# Patient Record
Sex: MALE | Race: WHITE | NOT HISPANIC OR LATINO
[De-identification: names, ages, dates, MRNs, and addresses within clinical notes are randomized per-mention and may not be internally consistent; named-entity substitution may affect disease eponyms.]

---

## 2018-01-11 ENCOUNTER — HOSPITAL (OUTPATIENT)
Dept: OTHER | Age: 58
End: 2018-01-11
Attending: FAMILY MEDICINE

## 2023-03-07 ENCOUNTER — TELEPHONE (OUTPATIENT)
Dept: PRIMARY CARE | Facility: CLINIC | Age: 63
End: 2023-03-07

## 2023-05-08 DIAGNOSIS — M62.838 MUSCLE SPASM: ICD-10-CM

## 2023-05-08 DIAGNOSIS — K21.9 GASTROESOPHAGEAL REFLUX DISEASE WITHOUT ESOPHAGITIS: Primary | ICD-10-CM

## 2023-05-08 DIAGNOSIS — G62.9 NEUROPATHY: ICD-10-CM

## 2023-05-08 RX ORDER — OMEPRAZOLE 40 MG/1
1 CAPSULE, DELAYED RELEASE ORAL DAILY
COMMUNITY
Start: 2019-01-17 | End: 2023-05-08 | Stop reason: SDUPTHER

## 2023-05-08 RX ORDER — OMEPRAZOLE 40 MG/1
40 CAPSULE, DELAYED RELEASE ORAL DAILY
Qty: 90 CAPSULE | Refills: 1 | Status: SHIPPED | OUTPATIENT
Start: 2023-05-08 | End: 2023-10-14 | Stop reason: HOSPADM

## 2023-05-15 RX ORDER — GABAPENTIN 600 MG/1
TABLET ORAL
COMMUNITY
Start: 2022-02-07 | End: 2023-05-15 | Stop reason: SDUPTHER

## 2023-05-15 RX ORDER — GABAPENTIN 600 MG/1
600 TABLET ORAL 3 TIMES DAILY
Qty: 270 TABLET | Refills: 0 | Status: SHIPPED | OUTPATIENT
Start: 2023-05-15 | End: 2023-06-12 | Stop reason: SDUPTHER

## 2023-05-15 RX ORDER — TIZANIDINE 4 MG/1
4 TABLET ORAL 3 TIMES DAILY
Qty: 30 TABLET | Refills: 0 | Status: ON HOLD | OUTPATIENT
Start: 2023-05-15 | End: 2023-10-14 | Stop reason: SDUPTHER

## 2023-05-15 RX ORDER — TIZANIDINE 4 MG/1
1 TABLET ORAL 3 TIMES DAILY
COMMUNITY
Start: 2019-01-17 | End: 2023-05-15 | Stop reason: SDUPTHER

## 2023-06-12 ENCOUNTER — OFFICE VISIT (OUTPATIENT)
Dept: PRIMARY CARE | Facility: CLINIC | Age: 63
End: 2023-06-12
Payer: COMMERCIAL

## 2023-06-12 VITALS
WEIGHT: 315 LBS | HEART RATE: 78 BPM | BODY MASS INDEX: 41.75 KG/M2 | HEIGHT: 73 IN | SYSTOLIC BLOOD PRESSURE: 130 MMHG | DIASTOLIC BLOOD PRESSURE: 77 MMHG

## 2023-06-12 DIAGNOSIS — I10 BENIGN ESSENTIAL HYPERTENSION: Primary | ICD-10-CM

## 2023-06-12 DIAGNOSIS — I27.82 OTHER CHRONIC PULMONARY EMBOLISM WITHOUT ACUTE COR PULMONALE (MULTI): ICD-10-CM

## 2023-06-12 DIAGNOSIS — G62.9 NEUROPATHY: ICD-10-CM

## 2023-06-12 DIAGNOSIS — N40.1 BENIGN PROSTATIC HYPERPLASIA WITH URINARY FREQUENCY: ICD-10-CM

## 2023-06-12 DIAGNOSIS — E29.1 TESTOSTERONE DEFICIENCY IN MALE: ICD-10-CM

## 2023-06-12 DIAGNOSIS — F41.8 DEPRESSION WITH ANXIETY: ICD-10-CM

## 2023-06-12 DIAGNOSIS — I87.2 VENOUS INSUFFICIENCY: ICD-10-CM

## 2023-06-12 DIAGNOSIS — E66.01 MORBID OBESITY (MULTI): ICD-10-CM

## 2023-06-12 DIAGNOSIS — N52.9 ERECTILE DYSFUNCTION, UNSPECIFIED ERECTILE DYSFUNCTION TYPE: ICD-10-CM

## 2023-06-12 DIAGNOSIS — K21.9 GASTROESOPHAGEAL REFLUX DISEASE WITHOUT ESOPHAGITIS: ICD-10-CM

## 2023-06-12 DIAGNOSIS — E03.8 CENTRAL HYPOTHYROIDISM: ICD-10-CM

## 2023-06-12 DIAGNOSIS — R35.0 BENIGN PROSTATIC HYPERPLASIA WITH URINARY FREQUENCY: ICD-10-CM

## 2023-06-12 PROBLEM — E66.9 OBESITY, CLASS II, BMI 35-39.9: Status: ACTIVE | Noted: 2023-06-12

## 2023-06-12 PROBLEM — M17.0 OSTEOARTHRITIS OF BOTH KNEES: Status: ACTIVE | Noted: 2023-06-12

## 2023-06-12 PROBLEM — L03.90 CELLULITIS: Status: ACTIVE | Noted: 2023-06-12

## 2023-06-12 PROBLEM — R53.82 CHRONIC FATIGUE: Status: ACTIVE | Noted: 2023-06-12

## 2023-06-12 PROBLEM — H52.209 ASTIGMATISM: Status: ACTIVE | Noted: 2023-06-12

## 2023-06-12 PROBLEM — G47.30 SLEEP APNEA: Status: ACTIVE | Noted: 2023-06-12

## 2023-06-12 PROBLEM — M19.90 ARTHRITIS: Status: ACTIVE | Noted: 2023-06-12

## 2023-06-12 PROBLEM — R63.2 POLYPHAGIA: Status: ACTIVE | Noted: 2023-06-12

## 2023-06-12 PROBLEM — H25.811 COMBINED FORM OF AGE-RELATED CATARACT, RIGHT EYE: Status: ACTIVE | Noted: 2023-06-12

## 2023-06-12 PROBLEM — R53.83 DECREASED ENERGY: Status: ACTIVE | Noted: 2023-06-12

## 2023-06-12 PROBLEM — M48.061 LUMBAR SPINAL STENOSIS: Status: ACTIVE | Noted: 2023-06-12

## 2023-06-12 PROBLEM — H35.89 MACULAR RPE MOTTLING: Status: ACTIVE | Noted: 2023-06-12

## 2023-06-12 PROBLEM — Z98.890 S/P LASIK SURGERY OF BOTH EYES: Status: ACTIVE | Noted: 2023-06-12

## 2023-06-12 PROBLEM — I26.99 PULMONARY EMBOLISM (MULTI): Status: ACTIVE | Noted: 2023-06-12

## 2023-06-12 PROBLEM — H52.4 PRESBYOPIA: Status: ACTIVE | Noted: 2023-06-12

## 2023-06-12 PROBLEM — I80.9 THROMBOPHLEBITIS: Status: ACTIVE | Noted: 2023-06-12

## 2023-06-12 PROBLEM — F41.0 PANIC DISORDER: Status: ACTIVE | Noted: 2023-06-12

## 2023-06-12 PROBLEM — F10.20 ALCOHOL USE DISORDER, MODERATE, DEPENDENCE (MULTI): Status: ACTIVE | Noted: 2023-06-12

## 2023-06-12 PROBLEM — E78.49 ESSENTIAL FAMILIAL HYPERLIPIDEMIA: Status: ACTIVE | Noted: 2023-06-12

## 2023-06-12 PROBLEM — G47.33 OBSTRUCTIVE SLEEP APNEA SYNDROME IN ADULT: Status: ACTIVE | Noted: 2023-06-12

## 2023-06-12 PROBLEM — N46.9 MALE INFERTILITY: Status: ACTIVE | Noted: 2023-06-12

## 2023-06-12 PROBLEM — R93.89 ABNORMAL CHEST CT: Status: ACTIVE | Noted: 2023-06-12

## 2023-06-12 PROBLEM — M54.50 LUMBAR PAIN: Status: ACTIVE | Noted: 2023-06-12

## 2023-06-12 PROBLEM — K91.2 MALNUTRITION FOLLOWING GASTROINTESTINAL SURGERY (HHS-HCC): Status: ACTIVE | Noted: 2023-06-12

## 2023-06-12 PROBLEM — M17.11 OSTEOARTHRITIS OF RIGHT KNEE: Status: ACTIVE | Noted: 2023-06-12

## 2023-06-12 PROBLEM — H52.00 HYPEROPIA: Status: ACTIVE | Noted: 2023-06-12

## 2023-06-12 PROBLEM — M76.70 PERONEAL TENDINITIS: Status: ACTIVE | Noted: 2023-06-12

## 2023-06-12 PROBLEM — N40.0 BPH WITHOUT URINARY OBSTRUCTION: Status: ACTIVE | Noted: 2023-06-12

## 2023-06-12 PROBLEM — H25.812 COMBINED FORM OF AGE-RELATED CATARACT, LEFT EYE: Status: ACTIVE | Noted: 2023-06-12

## 2023-06-12 PROBLEM — E66.812 OBESITY, CLASS II, BMI 35-39.9: Status: ACTIVE | Noted: 2023-06-12

## 2023-06-12 PROBLEM — D64.9 ANEMIA: Status: ACTIVE | Noted: 2023-06-12

## 2023-06-12 PROBLEM — M76.60 ACHILLES TENDINITIS: Status: ACTIVE | Noted: 2023-06-12

## 2023-06-12 PROBLEM — M25.569 KNEE PAIN: Status: ACTIVE | Noted: 2023-06-12

## 2023-06-12 PROBLEM — M43.10 ACQUIRED SPONDYLOLISTHESIS: Status: ACTIVE | Noted: 2023-06-12

## 2023-06-12 PROBLEM — M25.879 ANKLE IMPINGEMENT SYNDROME: Status: ACTIVE | Noted: 2023-06-12

## 2023-06-12 PROBLEM — R33.9 URINARY RETENTION WITH INCOMPLETE BLADDER EMPTYING: Status: ACTIVE | Noted: 2023-06-12

## 2023-06-12 LAB
ALANINE AMINOTRANSFERASE (SGPT) (U/L) IN SER/PLAS: 50 U/L (ref 10–52)
ALBUMIN (G/DL) IN SER/PLAS: 4.3 G/DL (ref 3.4–5)
ALKALINE PHOSPHATASE (U/L) IN SER/PLAS: 54 U/L (ref 33–136)
ANION GAP IN SER/PLAS: 14 MMOL/L (ref 10–20)
ASPARTATE AMINOTRANSFERASE (SGOT) (U/L) IN SER/PLAS: 45 U/L (ref 9–39)
BILIRUBIN TOTAL (MG/DL) IN SER/PLAS: 0.8 MG/DL (ref 0–1.2)
CALCIUM (MG/DL) IN SER/PLAS: 9.8 MG/DL (ref 8.6–10.6)
CARBON DIOXIDE, TOTAL (MMOL/L) IN SER/PLAS: 25 MMOL/L (ref 21–32)
CHLORIDE (MMOL/L) IN SER/PLAS: 103 MMOL/L (ref 98–107)
CHOLESTEROL (MG/DL) IN SER/PLAS: 154 MG/DL (ref 0–199)
CHOLESTEROL IN HDL (MG/DL) IN SER/PLAS: 40.4 MG/DL
CHOLESTEROL/HDL RATIO: 3.8
CREATININE (MG/DL) IN SER/PLAS: 1.05 MG/DL (ref 0.5–1.3)
ESTRADIOL (PG/ML) IN SER/PLAS: 81 PG/ML
GFR MALE: 80 ML/MIN/1.73M2
GLUCOSE (MG/DL) IN SER/PLAS: 98 MG/DL (ref 74–99)
LDL: 76 MG/DL (ref 0–99)
POTASSIUM (MMOL/L) IN SER/PLAS: 3.9 MMOL/L (ref 3.5–5.3)
PROSTATE SPECIFIC AG (NG/ML) IN SER/PLAS: 1.8 NG/ML (ref 0–4)
PROTEIN TOTAL: 7.1 G/DL (ref 6.4–8.2)
SODIUM (MMOL/L) IN SER/PLAS: 138 MMOL/L (ref 136–145)
THYROPEROXIDASE AB (IU/ML) IN SER/PLAS: <28 IU/ML
THYROTROPIN (MIU/L) IN SER/PLAS BY DETECTION LIMIT <= 0.05 MIU/L: 1.96 MIU/L (ref 0.44–3.98)
THYROXINE (T4) FREE (NG/DL) IN SER/PLAS: 0.87 NG/DL (ref 0.78–1.48)
TRIGLYCERIDE (MG/DL) IN SER/PLAS: 188 MG/DL (ref 0–149)
UREA NITROGEN (MG/DL) IN SER/PLAS: 17 MG/DL (ref 6–23)
VLDL: 38 MG/DL (ref 0–40)

## 2023-06-12 PROCEDURE — 84403 ASSAY OF TOTAL TESTOSTERONE: CPT

## 2023-06-12 PROCEDURE — 84402 ASSAY OF FREE TESTOSTERONE: CPT

## 2023-06-12 PROCEDURE — 99214 OFFICE O/P EST MOD 30 MIN: CPT | Performed by: FAMILY MEDICINE

## 2023-06-12 PROCEDURE — 1036F TOBACCO NON-USER: CPT | Performed by: FAMILY MEDICINE

## 2023-06-12 PROCEDURE — 3075F SYST BP GE 130 - 139MM HG: CPT | Performed by: FAMILY MEDICINE

## 2023-06-12 PROCEDURE — 80061 LIPID PANEL: CPT

## 2023-06-12 PROCEDURE — 84443 ASSAY THYROID STIM HORMONE: CPT

## 2023-06-12 PROCEDURE — 82670 ASSAY OF TOTAL ESTRADIOL: CPT

## 2023-06-12 PROCEDURE — 80053 COMPREHEN METABOLIC PANEL: CPT

## 2023-06-12 PROCEDURE — 86376 MICROSOMAL ANTIBODY EACH: CPT

## 2023-06-12 PROCEDURE — 84439 ASSAY OF FREE THYROXINE: CPT

## 2023-06-12 PROCEDURE — 84153 ASSAY OF PSA TOTAL: CPT

## 2023-06-12 PROCEDURE — 3078F DIAST BP <80 MM HG: CPT | Performed by: FAMILY MEDICINE

## 2023-06-12 RX ORDER — MELATONIN 10 MG
1 TABLET, SUBLINGUAL SUBLINGUAL EVERY OTHER DAY
COMMUNITY

## 2023-06-12 RX ORDER — IRBESARTAN AND HYDROCHLOROTHIAZIDE 150; 12.5 MG/1; MG/1
TABLET, FILM COATED ORAL
COMMUNITY
End: 2023-07-20 | Stop reason: SDUPTHER

## 2023-06-12 RX ORDER — ROSUVASTATIN CALCIUM 10 MG/1
TABLET, COATED ORAL
COMMUNITY
End: 2023-10-16 | Stop reason: SDUPTHER

## 2023-06-12 RX ORDER — TADALAFIL 20 MG/1
TABLET ORAL
COMMUNITY
End: 2023-07-19 | Stop reason: SDUPTHER

## 2023-06-12 RX ORDER — QUETIAPINE FUMARATE 50 MG/1
TABLET, FILM COATED ORAL
COMMUNITY
End: 2023-07-19 | Stop reason: SDUPTHER

## 2023-06-12 RX ORDER — ALPHA LIPOIC ACID 200 MG
CAPSULE ORAL
COMMUNITY
End: 2024-03-06 | Stop reason: WASHOUT

## 2023-06-12 RX ORDER — GABAPENTIN 600 MG/1
600 TABLET ORAL 3 TIMES DAILY
Qty: 270 TABLET | Refills: 1 | Status: SHIPPED | OUTPATIENT
Start: 2023-06-12 | End: 2023-10-16 | Stop reason: SDUPTHER

## 2023-06-12 RX ORDER — BUPROPION HYDROCHLORIDE 100 MG/1
100 TABLET ORAL 3 TIMES DAILY
Qty: 270 TABLET | Refills: 1 | Status: SHIPPED | OUTPATIENT
Start: 2023-06-12 | End: 2023-10-16 | Stop reason: SDUPTHER

## 2023-06-12 RX ORDER — MELOXICAM 15 MG/1
15 TABLET ORAL DAILY PRN
COMMUNITY
End: 2023-10-14 | Stop reason: HOSPADM

## 2023-06-12 RX ORDER — BUPROPION HYDROCHLORIDE 100 MG/1
TABLET ORAL
COMMUNITY
End: 2023-06-12 | Stop reason: SDUPTHER

## 2023-06-12 ASSESSMENT — ENCOUNTER SYMPTOMS
HEMATOLOGIC/LYMPHATIC NEGATIVE: 1
GASTROINTESTINAL NEGATIVE: 1
CONSTITUTIONAL NEGATIVE: 1
MUSCULOSKELETAL NEGATIVE: 1
CARDIOVASCULAR NEGATIVE: 1
NEUROLOGICAL NEGATIVE: 1
EYES NEGATIVE: 1
PSYCHIATRIC NEGATIVE: 1
RESPIRATORY NEGATIVE: 1
ALLERGIC/IMMUNOLOGIC NEGATIVE: 1
ENDOCRINE NEGATIVE: 1

## 2023-06-12 NOTE — PROGRESS NOTES
Pt comes in for fu on meds. Pt has a sheet that has labs that he needs drawn for his specialist. Pt has no concerns today. Pt is no longer on the testosterone inj, but on the pellets and unsure of his dosing.

## 2023-06-12 NOTE — PROGRESS NOTES
"Subjective   Patient ID: Cj Vargas is a 62 y.o. male who presents for No chief complaint on file..    HPI htn, venous inusff , fatiqeu , low t, ed, gerd,     Review of Systems   Constitutional: Negative.    HENT: Negative.     Eyes: Negative.    Respiratory: Negative.     Cardiovascular: Negative.    Gastrointestinal: Negative.    Endocrine: Negative.    Musculoskeletal: Negative.    Skin: Negative.    Allergic/Immunologic: Negative.    Neurological: Negative.    Hematological: Negative.    Psychiatric/Behavioral: Negative.         Objective   /77   Pulse 78   Ht 1.854 m (6' 1\")   Wt 144 kg (317 lb)   BMI 41.82 kg/m²     Physical Exam  Vitals reviewed.   Constitutional:       Appearance: Normal appearance. He is obese.   Eyes:      Extraocular Movements: Extraocular movements intact.      Conjunctiva/sclera: Conjunctivae normal.      Pupils: Pupils are equal, round, and reactive to light.   Cardiovascular:      Rate and Rhythm: Normal rate and regular rhythm.      Pulses: Normal pulses.      Heart sounds: Normal heart sounds.   Pulmonary:      Effort: Pulmonary effort is normal.      Breath sounds: Normal breath sounds.   Abdominal:      General: Bowel sounds are normal.      Palpations: Abdomen is soft.   Musculoskeletal:         General: Normal range of motion.   Skin:     General: Skin is warm and dry.   Neurological:      General: No focal deficit present.      Mental Status: He is alert and oriented to person, place, and time. Mental status is at baseline.         Assessment/Plan   Problem List Items Addressed This Visit          Circulatory    Benign essential hypertension - Primary    Relevant Orders    Comprehensive Metabolic Panel    Lipid Panel    Pulmonary embolism (CMS/HCC)    Relevant Medications    apixaban (Eliquis) 5 mg tablet    Venous insufficiency       Digestive    Acid reflux       Genitourinary    Erectile dysfunction       Endocrine/Metabolic    Morbid obesity (CMS/HCC)    " Relevant Orders    Referral to Nutrition Services    Testosterone deficiency in male    Relevant Orders    Testosterone, total and free    Estradiol     Other Visit Diagnoses       Neuropathy        Relevant Medications    gabapentin (Neurontin) 600 mg tablet    Benign prostatic hyperplasia with urinary frequency        Relevant Orders    Prostate Specific Antigen    Depression with anxiety        Relevant Medications    buPROPion (Wellbutrin) 100 mg tablet    Central hypothyroidism        Relevant Orders    TSH with reflex to Free T4 if abnormal    Thyroid Peroxidase (TPO) Antibody    T4, free

## 2023-06-13 ENCOUNTER — TELEPHONE (OUTPATIENT)
Dept: PRIMARY CARE | Facility: CLINIC | Age: 63
End: 2023-06-13
Payer: COMMERCIAL

## 2023-06-19 LAB
TESTOSTERONE FREE (CHAN): 193.9 PG/ML (ref 35–155)
TESTOSTERONE,TOTAL,LC-MS/MS: 1161 NG/DL (ref 250–1100)

## 2023-07-19 DIAGNOSIS — I10 BENIGN ESSENTIAL HYPERTENSION: ICD-10-CM

## 2023-07-19 DIAGNOSIS — N52.9 ERECTILE DYSFUNCTION, UNSPECIFIED ERECTILE DYSFUNCTION TYPE: Primary | ICD-10-CM

## 2023-07-19 DIAGNOSIS — F41.8 DEPRESSION WITH ANXIETY: ICD-10-CM

## 2023-07-20 RX ORDER — IRBESARTAN AND HYDROCHLOROTHIAZIDE 150; 12.5 MG/1; MG/1
1 TABLET, FILM COATED ORAL DAILY
Qty: 90 TABLET | Refills: 1 | Status: SHIPPED | OUTPATIENT
Start: 2023-07-20 | End: 2023-10-16 | Stop reason: SDUPTHER

## 2023-07-20 RX ORDER — TADALAFIL 20 MG/1
20 TABLET ORAL DAILY PRN
Qty: 10 TABLET | Refills: 3 | Status: SHIPPED | OUTPATIENT
Start: 2023-07-20 | End: 2023-10-25 | Stop reason: SDUPTHER

## 2023-07-20 RX ORDER — QUETIAPINE FUMARATE 50 MG/1
50 TABLET, FILM COATED ORAL 2 TIMES DAILY
Qty: 90 TABLET | Refills: 0 | Status: SHIPPED | OUTPATIENT
Start: 2023-07-20 | End: 2023-10-16 | Stop reason: SDUPTHER

## 2023-10-12 ENCOUNTER — APPOINTMENT (OUTPATIENT)
Dept: RADIOLOGY | Facility: HOSPITAL | Age: 63
DRG: 176 | End: 2023-10-12
Payer: COMMERCIAL

## 2023-10-12 ENCOUNTER — HOSPITAL ENCOUNTER (INPATIENT)
Facility: HOSPITAL | Age: 63
LOS: 2 days | Discharge: HOME | DRG: 176 | End: 2023-10-14
Attending: EMERGENCY MEDICINE | Admitting: INTERNAL MEDICINE
Payer: COMMERCIAL

## 2023-10-12 DIAGNOSIS — M62.838 MUSCLE SPASM: ICD-10-CM

## 2023-10-12 DIAGNOSIS — G47.33 OBSTRUCTIVE SLEEP APNEA SYNDROME IN ADULT: ICD-10-CM

## 2023-10-12 DIAGNOSIS — I82.431 ACUTE DEEP VEIN THROMBOSIS (DVT) OF POPLITEAL VEIN OF RIGHT LOWER EXTREMITY (MULTI): ICD-10-CM

## 2023-10-12 DIAGNOSIS — I26.99 ACUTE PULMONARY EMBOLISM, UNSPECIFIED PULMONARY EMBOLISM TYPE, UNSPECIFIED WHETHER ACUTE COR PULMONALE PRESENT (MULTI): Primary | ICD-10-CM

## 2023-10-12 DIAGNOSIS — I26.94 MULTIPLE SUBSEGMENTAL PULMONARY EMBOLI WITHOUT ACUTE COR PULMONALE (MULTI): ICD-10-CM

## 2023-10-12 DIAGNOSIS — I26.99 OTHER ACUTE PULMONARY EMBOLISM, UNSPECIFIED WHETHER ACUTE COR PULMONALE PRESENT (MULTI): ICD-10-CM

## 2023-10-12 LAB
ALBUMIN SERPL BCP-MCNC: 4.2 G/DL (ref 3.4–5)
ALP SERPL-CCNC: 47 U/L (ref 33–136)
ALT SERPL W P-5'-P-CCNC: 48 U/L (ref 10–52)
ANION GAP SERPL CALC-SCNC: 11 MMOL/L (ref 10–20)
ANION GAP SERPL CALC-SCNC: 12 MMOL/L (ref 10–20)
APPEARANCE UR: CLEAR
AST SERPL W P-5'-P-CCNC: 39 U/L (ref 9–39)
BASOPHILS # BLD AUTO: 0.02 X10*3/UL (ref 0–0.1)
BASOPHILS NFR BLD AUTO: 0.4 %
BILIRUB SERPL-MCNC: 1 MG/DL (ref 0–1.2)
BILIRUB UR STRIP.AUTO-MCNC: NEGATIVE MG/DL
BUN SERPL-MCNC: 10 MG/DL (ref 6–23)
BUN SERPL-MCNC: 11 MG/DL (ref 6–23)
CALCIUM SERPL-MCNC: 9.2 MG/DL (ref 8.6–10.3)
CALCIUM SERPL-MCNC: 9.5 MG/DL (ref 8.6–10.3)
CARDIAC TROPONIN I PNL SERPL HS: 4 NG/L (ref 0–20)
CHLORIDE SERPL-SCNC: 101 MMOL/L (ref 98–107)
CHLORIDE SERPL-SCNC: 102 MMOL/L (ref 98–107)
CO2 SERPL-SCNC: 25 MMOL/L (ref 21–32)
CO2 SERPL-SCNC: 28 MMOL/L (ref 21–32)
COLOR UR: YELLOW
CREAT SERPL-MCNC: 0.97 MG/DL (ref 0.5–1.3)
CREAT SERPL-MCNC: 1.11 MG/DL (ref 0.5–1.3)
D DIMER PPP FEU-MCNC: 1637 NG/ML FEU
EOSINOPHIL # BLD AUTO: 0.15 X10*3/UL (ref 0–0.7)
EOSINOPHIL NFR BLD AUTO: 2.7 %
ERYTHROCYTE [DISTWIDTH] IN BLOOD BY AUTOMATED COUNT: 11.7 % (ref 11.5–14.5)
ERYTHROCYTE [DISTWIDTH] IN BLOOD BY AUTOMATED COUNT: 11.7 % (ref 11.5–14.5)
GFR SERPL CREATININE-BSD FRML MDRD: 75 ML/MIN/1.73M*2
GFR SERPL CREATININE-BSD FRML MDRD: 88 ML/MIN/1.73M*2
GLUCOSE SERPL-MCNC: 105 MG/DL (ref 74–99)
GLUCOSE SERPL-MCNC: 122 MG/DL (ref 74–99)
GLUCOSE UR STRIP.AUTO-MCNC: ABNORMAL MG/DL
HCT VFR BLD AUTO: 44.2 % (ref 41–52)
HCT VFR BLD AUTO: 45.3 % (ref 41–52)
HGB BLD-MCNC: 15 G/DL (ref 13.5–17.5)
HGB BLD-MCNC: 15.4 G/DL (ref 13.5–17.5)
IMM GRANULOCYTES # BLD AUTO: 0.01 X10*3/UL (ref 0–0.7)
IMM GRANULOCYTES NFR BLD AUTO: 0.2 % (ref 0–0.9)
KETONES UR STRIP.AUTO-MCNC: NEGATIVE MG/DL
LEUKOCYTE ESTERASE UR QL STRIP.AUTO: NEGATIVE
LYMPHOCYTES # BLD AUTO: 1.6 X10*3/UL (ref 1.2–4.8)
LYMPHOCYTES NFR BLD AUTO: 28.6 %
MCH RBC QN AUTO: 32.8 PG (ref 26–34)
MCH RBC QN AUTO: 32.8 PG (ref 26–34)
MCHC RBC AUTO-ENTMCNC: 33.9 G/DL (ref 32–36)
MCHC RBC AUTO-ENTMCNC: 34 G/DL (ref 32–36)
MCV RBC AUTO: 96 FL (ref 80–100)
MCV RBC AUTO: 97 FL (ref 80–100)
MONOCYTES # BLD AUTO: 0.44 X10*3/UL (ref 0.1–1)
MONOCYTES NFR BLD AUTO: 7.9 %
NEUTROPHILS # BLD AUTO: 3.37 X10*3/UL (ref 1.2–7.7)
NEUTROPHILS NFR BLD AUTO: 60.2 %
NITRITE UR QL STRIP.AUTO: NEGATIVE
NRBC BLD-RTO: 0 /100 WBCS (ref 0–0)
NRBC BLD-RTO: 0 /100 WBCS (ref 0–0)
PH UR STRIP.AUTO: 6 [PH]
PLATELET # BLD AUTO: 288 X10*3/UL (ref 150–450)
PLATELET # BLD AUTO: 292 X10*3/UL (ref 150–450)
PMV BLD AUTO: 9.7 FL (ref 7.5–11.5)
PMV BLD AUTO: 9.8 FL (ref 7.5–11.5)
POTASSIUM SERPL-SCNC: 3.8 MMOL/L (ref 3.5–5.3)
POTASSIUM SERPL-SCNC: 4 MMOL/L (ref 3.5–5.3)
PROT SERPL-MCNC: 7.1 G/DL (ref 6.4–8.2)
PROT UR STRIP.AUTO-MCNC: NEGATIVE MG/DL
RBC # BLD AUTO: 4.58 X10*6/UL (ref 4.5–5.9)
RBC # BLD AUTO: 4.7 X10*6/UL (ref 4.5–5.9)
RBC # UR STRIP.AUTO: NEGATIVE /UL
SODIUM SERPL-SCNC: 135 MMOL/L (ref 136–145)
SODIUM SERPL-SCNC: 136 MMOL/L (ref 136–145)
SP GR UR STRIP.AUTO: 1.03
UFH PPP CHRO-ACNC: 0.2 IU/ML
UFH PPP CHRO-ACNC: 0.4 IU/ML
UFH PPP CHRO-ACNC: 0.5 IU/ML
UROBILINOGEN UR STRIP.AUTO-MCNC: 4 MG/DL
WBC # BLD AUTO: 5.6 X10*3/UL (ref 4.4–11.3)
WBC # BLD AUTO: 6.7 X10*3/UL (ref 4.4–11.3)

## 2023-10-12 PROCEDURE — 85520 HEPARIN ASSAY: CPT | Performed by: EMERGENCY MEDICINE

## 2023-10-12 PROCEDURE — 2550000001 HC RX 255 CONTRASTS: Performed by: EMERGENCY MEDICINE

## 2023-10-12 PROCEDURE — 85025 COMPLETE CBC W/AUTO DIFF WBC: CPT | Performed by: EMERGENCY MEDICINE

## 2023-10-12 PROCEDURE — 71275 CT ANGIOGRAPHY CHEST: CPT

## 2023-10-12 PROCEDURE — 84484 ASSAY OF TROPONIN QUANT: CPT | Performed by: EMERGENCY MEDICINE

## 2023-10-12 PROCEDURE — 36415 COLL VENOUS BLD VENIPUNCTURE: CPT | Performed by: EMERGENCY MEDICINE

## 2023-10-12 PROCEDURE — 99222 1ST HOSP IP/OBS MODERATE 55: CPT | Performed by: INTERNAL MEDICINE

## 2023-10-12 PROCEDURE — 81003 URINALYSIS AUTO W/O SCOPE: CPT | Performed by: INTERNAL MEDICINE

## 2023-10-12 PROCEDURE — 80048 BASIC METABOLIC PNL TOTAL CA: CPT | Performed by: INTERNAL MEDICINE

## 2023-10-12 PROCEDURE — 93971 EXTREMITY STUDY: CPT | Performed by: STUDENT IN AN ORGANIZED HEALTH CARE EDUCATION/TRAINING PROGRAM

## 2023-10-12 PROCEDURE — 71275 CT ANGIOGRAPHY CHEST: CPT | Mod: FOREIGN READ | Performed by: RADIOLOGY

## 2023-10-12 PROCEDURE — 99291 CRITICAL CARE FIRST HOUR: CPT | Mod: 25 | Performed by: EMERGENCY MEDICINE

## 2023-10-12 PROCEDURE — 2500000001 HC RX 250 WO HCPCS SELF ADMINISTERED DRUGS (ALT 637 FOR MEDICARE OP): Performed by: INTERNAL MEDICINE

## 2023-10-12 PROCEDURE — 85520 HEPARIN ASSAY: CPT | Performed by: INTERNAL MEDICINE

## 2023-10-12 PROCEDURE — 2500000004 HC RX 250 GENERAL PHARMACY W/ HCPCS (ALT 636 FOR OP/ED): Performed by: INTERNAL MEDICINE

## 2023-10-12 PROCEDURE — 85379 FIBRIN DEGRADATION QUANT: CPT | Performed by: EMERGENCY MEDICINE

## 2023-10-12 PROCEDURE — 93970 EXTREMITY STUDY: CPT

## 2023-10-12 PROCEDURE — 1200000002 HC GENERAL ROOM WITH TELEMETRY DAILY

## 2023-10-12 PROCEDURE — 85027 COMPLETE CBC AUTOMATED: CPT | Performed by: INTERNAL MEDICINE

## 2023-10-12 PROCEDURE — 80053 COMPREHEN METABOLIC PANEL: CPT | Performed by: EMERGENCY MEDICINE

## 2023-10-12 RX ORDER — IRBESARTAN AND HYDROCHLOROTHIAZIDE 150; 12.5 MG/1; MG/1
1 TABLET, FILM COATED ORAL DAILY
Status: DISCONTINUED | OUTPATIENT
Start: 2023-10-12 | End: 2023-10-12 | Stop reason: RX

## 2023-10-12 RX ORDER — LOSARTAN POTASSIUM 50 MG/1
50 TABLET ORAL DAILY
Status: DISCONTINUED | OUTPATIENT
Start: 2023-10-12 | End: 2023-10-14 | Stop reason: HOSPADM

## 2023-10-12 RX ORDER — TIZANIDINE 4 MG/1
4 TABLET ORAL 3 TIMES DAILY PRN
Status: DISCONTINUED | OUTPATIENT
Start: 2023-10-12 | End: 2023-10-14 | Stop reason: HOSPADM

## 2023-10-12 RX ORDER — ROSUVASTATIN CALCIUM 10 MG/1
10 TABLET, COATED ORAL DAILY
Status: DISCONTINUED | OUTPATIENT
Start: 2023-10-12 | End: 2023-10-14 | Stop reason: HOSPADM

## 2023-10-12 RX ORDER — TRAMADOL HYDROCHLORIDE 50 MG/1
50 TABLET ORAL 4 TIMES DAILY PRN
Status: DISCONTINUED | OUTPATIENT
Start: 2023-10-12 | End: 2023-10-14 | Stop reason: HOSPADM

## 2023-10-12 RX ORDER — ONDANSETRON 4 MG/1
4 TABLET, FILM COATED ORAL EVERY 8 HOURS PRN
Status: DISCONTINUED | OUTPATIENT
Start: 2023-10-12 | End: 2023-10-14 | Stop reason: HOSPADM

## 2023-10-12 RX ORDER — TALC
3 POWDER (GRAM) TOPICAL DAILY
Status: DISCONTINUED | OUTPATIENT
Start: 2023-10-12 | End: 2023-10-14 | Stop reason: HOSPADM

## 2023-10-12 RX ORDER — HEPARIN SODIUM 5000 [USP'U]/ML
10000 INJECTION, SOLUTION INTRAVENOUS; SUBCUTANEOUS ONCE
Status: DISCONTINUED | OUTPATIENT
Start: 2023-10-12 | End: 2023-10-12

## 2023-10-12 RX ORDER — HYDROCHLOROTHIAZIDE 12.5 MG/1
12.5 TABLET ORAL DAILY
Status: DISCONTINUED | OUTPATIENT
Start: 2023-10-12 | End: 2023-10-14 | Stop reason: HOSPADM

## 2023-10-12 RX ORDER — HEPARIN SODIUM 5000 [USP'U]/ML
INJECTION, SOLUTION INTRAVENOUS; SUBCUTANEOUS
Status: DISCONTINUED
Start: 2023-10-12 | End: 2023-10-12 | Stop reason: WASHOUT

## 2023-10-12 RX ORDER — GABAPENTIN 300 MG/1
600 CAPSULE ORAL EVERY 8 HOURS SCHEDULED
Status: DISCONTINUED | OUTPATIENT
Start: 2023-10-12 | End: 2023-10-14 | Stop reason: HOSPADM

## 2023-10-12 RX ORDER — BUPROPION HYDROCHLORIDE 100 MG/1
100 TABLET ORAL 3 TIMES DAILY
Status: DISCONTINUED | OUTPATIENT
Start: 2023-10-12 | End: 2023-10-14 | Stop reason: HOSPADM

## 2023-10-12 RX ORDER — OXYCODONE HYDROCHLORIDE 5 MG/1
5 TABLET ORAL EVERY 6 HOURS PRN
Status: DISCONTINUED | OUTPATIENT
Start: 2023-10-12 | End: 2023-10-14 | Stop reason: HOSPADM

## 2023-10-12 RX ORDER — POLYETHYLENE GLYCOL 3350 17 G/17G
17 POWDER, FOR SOLUTION ORAL DAILY
Status: DISCONTINUED | OUTPATIENT
Start: 2023-10-12 | End: 2023-10-14 | Stop reason: HOSPADM

## 2023-10-12 RX ORDER — PANTOPRAZOLE SODIUM 40 MG/10ML
40 INJECTION, POWDER, LYOPHILIZED, FOR SOLUTION INTRAVENOUS
Status: DISCONTINUED | OUTPATIENT
Start: 2023-10-13 | End: 2023-10-14 | Stop reason: HOSPADM

## 2023-10-12 RX ORDER — HEPARIN SODIUM 5000 [USP'U]/ML
5000-10000 INJECTION, SOLUTION INTRAVENOUS; SUBCUTANEOUS EVERY 4 HOURS PRN
Status: DISCONTINUED | OUTPATIENT
Start: 2023-10-12 | End: 2023-10-14

## 2023-10-12 RX ORDER — PANTOPRAZOLE SODIUM 40 MG/1
40 TABLET, DELAYED RELEASE ORAL
Status: DISCONTINUED | OUTPATIENT
Start: 2023-10-13 | End: 2023-10-14 | Stop reason: HOSPADM

## 2023-10-12 RX ORDER — HEPARIN SODIUM 10000 [USP'U]/100ML
INJECTION, SOLUTION INTRAVENOUS
Status: COMPLETED
Start: 2023-10-12 | End: 2023-10-13

## 2023-10-12 RX ORDER — QUETIAPINE FUMARATE 50 MG/1
50 TABLET, FILM COATED ORAL 2 TIMES DAILY
Status: DISCONTINUED | OUTPATIENT
Start: 2023-10-12 | End: 2023-10-14 | Stop reason: HOSPADM

## 2023-10-12 RX ORDER — ONDANSETRON HYDROCHLORIDE 2 MG/ML
4 INJECTION, SOLUTION INTRAVENOUS EVERY 8 HOURS PRN
Status: DISCONTINUED | OUTPATIENT
Start: 2023-10-12 | End: 2023-10-14 | Stop reason: HOSPADM

## 2023-10-12 RX ORDER — HEPARIN SODIUM 5000 [USP'U]/ML
10000 INJECTION, SOLUTION INTRAVENOUS; SUBCUTANEOUS ONCE
Status: COMPLETED | OUTPATIENT
Start: 2023-10-12 | End: 2023-10-13

## 2023-10-12 RX ORDER — ACETAMINOPHEN 325 MG/1
650 TABLET ORAL EVERY 6 HOURS PRN
Status: DISCONTINUED | OUTPATIENT
Start: 2023-10-12 | End: 2023-10-14 | Stop reason: HOSPADM

## 2023-10-12 RX ORDER — HEPARIN SODIUM 10000 [USP'U]/100ML
0-4500 INJECTION, SOLUTION INTRAVENOUS CONTINUOUS
Status: DISCONTINUED | OUTPATIENT
Start: 2023-10-12 | End: 2023-10-14

## 2023-10-12 RX ORDER — GUAIFENESIN 600 MG/1
600 TABLET, EXTENDED RELEASE ORAL EVERY 12 HOURS PRN
Status: DISCONTINUED | OUTPATIENT
Start: 2023-10-12 | End: 2023-10-14 | Stop reason: HOSPADM

## 2023-10-12 RX ADMIN — HYDROCHLOROTHIAZIDE 12.5 MG: 12.5 TABLET ORAL at 21:56

## 2023-10-12 RX ADMIN — QUETIAPINE FUMARATE 50 MG: 50 TABLET ORAL at 21:31

## 2023-10-12 RX ADMIN — OXYCODONE HYDROCHLORIDE 5 MG: 5 TABLET ORAL at 16:39

## 2023-10-12 RX ADMIN — LOSARTAN POTASSIUM 50 MG: 50 TABLET, FILM COATED ORAL at 21:56

## 2023-10-12 RX ADMIN — ACETAMINOPHEN 650 MG: 325 TABLET ORAL at 16:39

## 2023-10-12 RX ADMIN — IOHEXOL 75 ML: 350 INJECTION, SOLUTION INTRAVENOUS at 11:42

## 2023-10-12 RX ADMIN — BUPROPION HYDROCHLORIDE 100 MG: 100 TABLET, FILM COATED ORAL at 21:31

## 2023-10-12 RX ADMIN — GABAPENTIN 600 MG: 300 CAPSULE ORAL at 21:31

## 2023-10-12 RX ADMIN — TRAMADOL HYDROCHLORIDE 50 MG: 50 TABLET, COATED ORAL at 21:36

## 2023-10-12 SDOH — HEALTH STABILITY: MENTAL HEALTH: HOW OFTEN DO YOU HAVE A DRINK CONTAINING ALCOHOL?: MONTHLY OR LESS

## 2023-10-12 SDOH — SOCIAL STABILITY: SOCIAL INSECURITY: WITHIN THE LAST YEAR, HAVE YOU BEEN AFRAID OF YOUR PARTNER OR EX-PARTNER?: NO

## 2023-10-12 SDOH — SOCIAL STABILITY: SOCIAL INSECURITY: HAVE YOU HAD THOUGHTS OF HARMING ANYONE ELSE?: NO

## 2023-10-12 SDOH — ECONOMIC STABILITY: FOOD INSECURITY: WITHIN THE PAST 12 MONTHS, THE FOOD YOU BOUGHT JUST DIDN'T LAST AND YOU DIDN'T HAVE MONEY TO GET MORE.: NEVER TRUE

## 2023-10-12 SDOH — SOCIAL STABILITY: SOCIAL INSECURITY
WITHIN THE LAST YEAR, HAVE TO BEEN RAPED OR FORCED TO HAVE ANY KIND OF SEXUAL ACTIVITY BY YOUR PARTNER OR EX-PARTNER?: NO

## 2023-10-12 SDOH — ECONOMIC STABILITY: INCOME INSECURITY: IN THE PAST 12 MONTHS, HAS THE ELECTRIC, GAS, OIL, OR WATER COMPANY THREATENED TO SHUT OFF SERVICE IN YOUR HOME?: NO

## 2023-10-12 SDOH — HEALTH STABILITY: MENTAL HEALTH: HOW OFTEN DO YOU HAVE 6 OR MORE DRINKS ON ONE OCCASION?: NEVER

## 2023-10-12 SDOH — HEALTH STABILITY: MENTAL HEALTH: HOW MANY STANDARD DRINKS CONTAINING ALCOHOL DO YOU HAVE ON A TYPICAL DAY?: 1 OR 2

## 2023-10-12 SDOH — ECONOMIC STABILITY: TRANSPORTATION INSECURITY
IN THE PAST 12 MONTHS, HAS THE LACK OF TRANSPORTATION KEPT YOU FROM MEDICAL APPOINTMENTS OR FROM GETTING MEDICATIONS?: NO

## 2023-10-12 SDOH — HEALTH STABILITY: PHYSICAL HEALTH: ON AVERAGE, HOW MANY DAYS PER WEEK DO YOU ENGAGE IN MODERATE TO STRENUOUS EXERCISE (LIKE A BRISK WALK)?: 0 DAYS

## 2023-10-12 SDOH — SOCIAL STABILITY: SOCIAL NETWORK
DO YOU BELONG TO ANY CLUBS OR ORGANIZATIONS SUCH AS CHURCH GROUPS UNIONS, FRATERNAL OR ATHLETIC GROUPS, OR SCHOOL GROUPS?: NO

## 2023-10-12 SDOH — SOCIAL STABILITY: SOCIAL NETWORK
IN A TYPICAL WEEK, HOW MANY TIMES DO YOU TALK ON THE PHONE WITH FAMILY, FRIENDS, OR NEIGHBORS?: MORE THAN THREE TIMES A WEEK

## 2023-10-12 SDOH — SOCIAL STABILITY: SOCIAL INSECURITY
WITHIN THE LAST YEAR, HAVE YOU BEEN KICKED, HIT, SLAPPED, OR OTHERWISE PHYSICALLY HURT BY YOUR PARTNER OR EX-PARTNER?: NO

## 2023-10-12 SDOH — ECONOMIC STABILITY: INCOME INSECURITY: IN THE LAST 12 MONTHS, WAS THERE A TIME WHEN YOU WERE NOT ABLE TO PAY THE MORTGAGE OR RENT ON TIME?: NO

## 2023-10-12 SDOH — SOCIAL STABILITY: SOCIAL NETWORK: HOW OFTEN DO YOU ATTEND CHURCH OR RELIGIOUS SERVICES?: NEVER

## 2023-10-12 SDOH — ECONOMIC STABILITY: HOUSING INSECURITY
IN THE LAST 12 MONTHS, WAS THERE A TIME WHEN YOU DID NOT HAVE A STEADY PLACE TO SLEEP OR SLEPT IN A SHELTER (INCLUDING NOW)?: NO

## 2023-10-12 SDOH — HEALTH STABILITY: MENTAL HEALTH
STRESS IS WHEN SOMEONE FEELS TENSE, NERVOUS, ANXIOUS, OR CAN'T SLEEP AT NIGHT BECAUSE THEIR MIND IS TROUBLED. HOW STRESSED ARE YOU?: TO SOME EXTENT

## 2023-10-12 SDOH — SOCIAL STABILITY: SOCIAL INSECURITY: WITHIN THE LAST YEAR, HAVE YOU BEEN HUMILIATED OR EMOTIONALLY ABUSED IN OTHER WAYS BY YOUR PARTNER OR EX-PARTNER?: NO

## 2023-10-12 SDOH — SOCIAL STABILITY: SOCIAL INSECURITY: ARE THERE ANY APPARENT SIGNS OF INJURIES/BEHAVIORS THAT COULD BE RELATED TO ABUSE/NEGLECT?: NO

## 2023-10-12 SDOH — SOCIAL STABILITY: SOCIAL NETWORK: ARE YOU MARRIED, WIDOWED, DIVORCED, SEPARATED, NEVER MARRIED, OR LIVING WITH A PARTNER?: MARRIED

## 2023-10-12 SDOH — SOCIAL STABILITY: SOCIAL INSECURITY: DO YOU FEEL ANYONE HAS EXPLOITED OR TAKEN ADVANTAGE OF YOU FINANCIALLY OR OF YOUR PERSONAL PROPERTY?: NO

## 2023-10-12 SDOH — SOCIAL STABILITY: SOCIAL INSECURITY: DOES ANYONE TRY TO KEEP YOU FROM HAVING/CONTACTING OTHER FRIENDS OR DOING THINGS OUTSIDE YOUR HOME?: NO

## 2023-10-12 SDOH — SOCIAL STABILITY: SOCIAL INSECURITY: HAS ANYONE EVER THREATENED TO HURT YOUR FAMILY OR YOUR PETS?: NO

## 2023-10-12 SDOH — SOCIAL STABILITY: SOCIAL NETWORK: HOW OFTEN DO YOU GET TOGETHER WITH FRIENDS OR RELATIVES?: THREE TIMES A WEEK

## 2023-10-12 SDOH — SOCIAL STABILITY: SOCIAL NETWORK: HOW OFTEN DO YOU ATTENT MEETINGS OF THE CLUB OR ORGANIZATION YOU BELONG TO?: NEVER

## 2023-10-12 SDOH — SOCIAL STABILITY: SOCIAL INSECURITY: ABUSE: ADULT

## 2023-10-12 SDOH — ECONOMIC STABILITY: FOOD INSECURITY: WITHIN THE PAST 12 MONTHS, YOU WORRIED THAT YOUR FOOD WOULD RUN OUT BEFORE YOU GOT MONEY TO BUY MORE.: NEVER TRUE

## 2023-10-12 SDOH — ECONOMIC STABILITY: HOUSING INSECURITY: IN THE LAST 12 MONTHS, HOW MANY PLACES HAVE YOU LIVED?: 1

## 2023-10-12 SDOH — ECONOMIC STABILITY: TRANSPORTATION INSECURITY
IN THE PAST 12 MONTHS, HAS LACK OF TRANSPORTATION KEPT YOU FROM MEETINGS, WORK, OR FROM GETTING THINGS NEEDED FOR DAILY LIVING?: NO

## 2023-10-12 SDOH — SOCIAL STABILITY: SOCIAL INSECURITY: ARE YOU OR HAVE YOU BEEN THREATENED OR ABUSED PHYSICALLY, EMOTIONALLY, OR SEXUALLY BY ANYONE?: NO

## 2023-10-12 SDOH — ECONOMIC STABILITY: INCOME INSECURITY: HOW HARD IS IT FOR YOU TO PAY FOR THE VERY BASICS LIKE FOOD, HOUSING, MEDICAL CARE, AND HEATING?: NOT HARD AT ALL

## 2023-10-12 SDOH — SOCIAL STABILITY: SOCIAL INSECURITY: DO YOU FEEL UNSAFE GOING BACK TO THE PLACE WHERE YOU ARE LIVING?: NO

## 2023-10-12 SDOH — HEALTH STABILITY: PHYSICAL HEALTH: ON AVERAGE, HOW MANY MINUTES DO YOU ENGAGE IN EXERCISE AT THIS LEVEL?: 0 MIN

## 2023-10-12 ASSESSMENT — LIFESTYLE VARIABLES
HOW MANY STANDARD DRINKS CONTAINING ALCOHOL DO YOU HAVE ON A TYPICAL DAY: PATIENT DOES NOT DRINK
SKIP TO QUESTIONS 9-10: 1
AUDIT-C TOTAL SCORE: 0
AUDIT-C TOTAL SCORE: 1
SKIP TO QUESTIONS 9-10: 1
PRESCIPTION_ABUSE_PAST_12_MONTHS: NO
HOW OFTEN DO YOU HAVE A DRINK CONTAINING ALCOHOL: NEVER
SUBSTANCE_ABUSE_PAST_12_MONTHS: NO
AUDIT-C TOTAL SCORE: 0
HOW OFTEN DO YOU HAVE 6 OR MORE DRINKS ON ONE OCCASION: NEVER

## 2023-10-12 ASSESSMENT — COGNITIVE AND FUNCTIONAL STATUS - GENERAL
MOBILITY SCORE: 24
PATIENT BASELINE BEDBOUND: NO
DAILY ACTIVITIY SCORE: 24
MOBILITY SCORE: 24
DAILY ACTIVITIY SCORE: 24

## 2023-10-12 ASSESSMENT — PAIN DESCRIPTION - DESCRIPTORS: DESCRIPTORS: STABBING;SHARP

## 2023-10-12 ASSESSMENT — ACTIVITIES OF DAILY LIVING (ADL)
LACK_OF_TRANSPORTATION: NO
JUDGMENT_ADEQUATE_SAFELY_COMPLETE_DAILY_ACTIVITIES: YES
WALKS IN HOME: INDEPENDENT
HEARING - LEFT EAR: FUNCTIONAL
PATIENT'S MEMORY ADEQUATE TO SAFELY COMPLETE DAILY ACTIVITIES?: YES
GROOMING: INDEPENDENT
TOILETING: INDEPENDENT
HEARING - RIGHT EAR: FUNCTIONAL
EFFECT OF PAIN ON DAILY ACTIVITIES: WEAKNESS
DRESSING YOURSELF: INDEPENDENT
FEEDING YOURSELF: INDEPENDENT
BATHING: INDEPENDENT
ADEQUATE_TO_COMPLETE_ADL: YES
ASSISTIVE_DEVICE: EYEGLASSES

## 2023-10-12 ASSESSMENT — PATIENT HEALTH QUESTIONNAIRE - PHQ9
SUM OF ALL RESPONSES TO PHQ9 QUESTIONS 1 & 2: 0
2. FEELING DOWN, DEPRESSED OR HOPELESS: NOT AT ALL
1. LITTLE INTEREST OR PLEASURE IN DOING THINGS: NOT AT ALL

## 2023-10-12 ASSESSMENT — COLUMBIA-SUICIDE SEVERITY RATING SCALE - C-SSRS
1. IN THE PAST MONTH, HAVE YOU WISHED YOU WERE DEAD OR WISHED YOU COULD GO TO SLEEP AND NOT WAKE UP?: NO
2. HAVE YOU ACTUALLY HAD ANY THOUGHTS OF KILLING YOURSELF?: NO
6. HAVE YOU EVER DONE ANYTHING, STARTED TO DO ANYTHING, OR PREPARED TO DO ANYTHING TO END YOUR LIFE?: NO

## 2023-10-12 ASSESSMENT — PAIN - FUNCTIONAL ASSESSMENT
PAIN_FUNCTIONAL_ASSESSMENT: 0-10
PAIN_FUNCTIONAL_ASSESSMENT: 0-10

## 2023-10-12 ASSESSMENT — PAIN SCALES - GENERAL
PAINLEVEL_OUTOF10: 5 - MODERATE PAIN
PAINLEVEL_OUTOF10: 5 - MODERATE PAIN
PAINLEVEL_OUTOF10: 7
PAINLEVEL_OUTOF10: 4

## 2023-10-12 NOTE — H&P
Cj Vargas is a 62 y.o. male   HPI   Patient with a past medical history of chronic DVTs and PEs on long-term Eliquis hypertension dyslipidemia chronic back pain depression who had a recent bout of COVID when he lost about 18 pounds and was bedbound for about a week back in September comes in with shortness of breath on exertion  Work-up in the emergency room shows an acute PE  The patient has been compliant with his Eliquis he takes 5 mg twice a day      Past Medical History  Past Medical History:   Diagnosis Date    Unspecified cataract     Cataracts, both eyes       Surgical History  Past Surgical History:   Procedure Laterality Date    OTHER SURGICAL HISTORY  01/17/2019    Appendectomy    OTHER SURGICAL HISTORY  01/17/2019    Tonsillectomy    OTHER SURGICAL HISTORY  12/04/2020    Knee replacement    OTHER SURGICAL HISTORY  12/14/2022    Corneal lasik    OTHER SURGICAL HISTORY  02/13/2020    Sleeve gastrectomy        Social History  He reports that he has never smoked. He has never used smokeless tobacco. No history on file for alcohol use and drug use.    Family History  No family history on file.     Allergies  Patient has no known allergies.    Review of Systems     Constitutional: not feeling poorly, no fever, no recent weight gain and no recent weight loss.   Eyes: no blurred vision and no diplopia.   ENT: no hearing loss, no tinnitus, no earache, no sore throat, no hoarseness and no swollen glands in the neck.   Cardiovascular: no chest pain, no tightness or heavy pressure, no shortness of breath, no palpitations and no lower extremity edema.   Respiratory: no cough, wheezing or shortness of breath at rest or exertion  Gastrointestinal: no change in bowel habits, no diarrhea, no constipation, no bloody stools, no nausea, no vomiting, no abdominal pain, no signs and symptoms of ulcer disease, no marly colored stools and no intolerance to fatty foods.   Genitourinary: no urinary frequency, no dysuria, no  hematuria, no burning sensation during urination, urinary stream is not smaller and urinary stream does not start and stop.   Musculoskeletal: no arthralgias, no joint stiffness, no muscle weakness, no back pain and no difficulty walking.   Skin: no rashes, no change in skin color and pigmentation, no skin lesions and no skin lumps.   Neurological: no headaches, no dizziness, no seizures, no tingling, no numbness, no signs and symptoms of stroke and no limb weakness.   Psychiatric: no confusion, no memory lapses or loss, no depression and no sleep disturbances.   Endocrine: no goiter, no thyroid disorder, no diabetes mellitus, no excessive thirst, no dry skin, no cold intolerance, no heat intolerance and no increased urinary frequency.   Hematologic/Lymphatic: is not slow to heal, does not bleed easily, does not bruise easily, no thrombophlebitis, no anemia and no history of blood transfusion.   All other systems have been reviewed and are negative for complaint.     Vitals:    10/12/23 1537   BP: 123/79   Pulse: 79   Resp: 18   Temp: 36.7 °C (98 °F)   SpO2: 97%        Scheduled medications  buPROPion, 100 mg, oral, TID  gabapentin, 600 mg, oral, q8h KARTHIK  heparin (porcine), , ,   heparin, 10,000 Units, intravenous, Once  irbesartan-hydrochlorothiazide, 1 tablet, oral, Daily  melatonin, 3 mg, oral, Daily  [START ON 10/13/2023] pantoprazole, 40 mg, oral, Daily before breakfast   Or  [START ON 10/13/2023] pantoprazole, 40 mg, intravenous, Daily before breakfast  polyethylene glycol, 17 g, oral, Daily  psyllium, 1 packet, oral, Daily  QUEtiapine, 50 mg, oral, BID  rosuvastatin, 10 mg, oral, Daily      Continuous medications  heparin, 0-4,500 Units/hr      PRN medications  PRN medications: acetaminophen, guaiFENesin, heparin (porcine), heparin, ondansetron **OR** ondansetron, oxyCODONE, tiZANidine, traMADol    Results from last 7 days   Lab Units 10/12/23  1034   WBC AUTO x10*3/uL 5.6   HEMOGLOBIN g/dL 15.0   HEMATOCRIT  % 44.2   PLATELETS AUTO x10*3/uL 288     Results from last 7 days   Lab Units 10/12/23  1034   SODIUM mmol/L 136   POTASSIUM mmol/L 4.0   CHLORIDE mmol/L 101   CO2 mmol/L 28   BUN mg/dL 10   CREATININE mg/dL 0.97   CALCIUM mg/dL 9.5   PROTEIN TOTAL g/dL 7.1   BILIRUBIN TOTAL mg/dL 1.0   ALK PHOS U/L 47   ALT U/L 48   AST U/L 39   GLUCOSE mg/dL 122*     Results from last 7 days   Lab Units 10/12/23  1034   TROPHS ng/L 4        CT angio chest for pulmonary embolism   Final Result   1. Pulmonary arteries are adequately opacified and there are no   acute-appearing pulmonary emboli in the right middle and lower lobes   with mild embolic burden. There are also chronic appearing emboli   bilaterally similar compared to 4/27/2022. No evidence of right heart   strain.   2. Persistent small groundglass opacities in the periphery of the left   upper lobe.   3. Few scattered small pulmonary nodules measuring up to 3 to 4 mm in   the left lower lobe unchanged from prior. Next   Findings discussed with and acknowledged by Dr. Nir Au 12:38   PM   Fleischner Society 2017 Guidelines for Management of Incidentally   Detected Pulmonary Nodules in Adults:   A.  SOLID NODULES*   Nodule Type and Size:   Single:   *Low Risk**    < 6 mm - No routine follow-up   6-8 mm - CT at 6-12 months, then consider CT at 18-24 months   > 8 mm - Consider CT at 3 months, PET/CT, or tissue sampling   *High Risk**   < 6 mm - Optional CT at 12 months   6-8 mm - CT at 6-12 months, then CT at 18-24 months   > 8 mm - Consider CT at 3 months, PET/CT, or tissue sampling   Multiple:   *Low Risk**    < 6 mm - No routine follow-up   6-8 mm - CT at 3-6 months, then consider CT at 18-24 months   > 8 mm - CT at 3-6 months, then consider CT at 18-24 months   *High Risk**   < 6 mm - Optional CT at 12 months   6-8 mm - CT at 3-6 months, then at 18-24 months   > 8 mm - CT at 3-6 months, then at 18-24 months   B. SUBSOLID NODULES*   Nodule Type and Size:   Single:      *Ground glass   < 6 mm - No routine follow-up   > 6 mm - CT at 6-12 months to confirm persistence, then CT every 2   years until 5 years   *Part Solid   < 6 mm - No routine follow-up   > 6 mm - CT at 3-6 months to confirm persistence.  If unchanged and   solid component remains < 6 mm, annual CT should be performed for 5   years.   Multiple:   < 6 mm - CT at 3-6 months.  If stable, consider CT at 2 and 4 years.   > 6 mm - CT at 3-6 months.  Subsequent management based on the most   suspicious nodule(s).   Note - These recommendations do not apply to lung cancer screening,   patients with immunosuppression, or patients with known primary   cancer.   * Dimensions are average of long and short axes, rounded to the   nearest millimeter.   ** Consider all relevant risk factors.   Signed by Bebeto Dean MD          Physical Exam     Constitutional   General appearance: Alert and in no acute distress.   Eyes   Inspection of eyes: Sclera and conjunctiva were normal.    Pupil exam: Pupils were equal in size. Extraocular movements were intact.   Pulmonary   Respiratory assessment: No respiratory distress, normal respiratory rhythm and effort.    Auscultation of Lungs: Clear bilateral breath sounds.   Cardiovascular   Auscultation of heart: Apical pulse normal, heart rate and rhythm normal, normal S1 and S2, no murmurs and no pericardial rub.    Exam for edema: No peripheral edema.   Abdomen   Abdominal Exam: No bruits, normal bowel sounds, soft, non-tender, no abdominal mass palpated.    Liver and Spleen exam: No hepato-splenomegaly.   Musculoskeletal   Examination of gait: Normal.    Inspection of digits and nails: No clubbing or cyanosis of the fingernails.    Inspection/palpation of joints, bones and muscles: No joint swelling. Normal movement of all extremities.   Skin   Skin inspection: Normal skin color and pigmentation, normal skin turgor and no visible rash.   Neurologic   Cranial nerves: Nerves 2-12 were  intact, no focal neuro defects.   Psychiatric   Orientation: Oriented to person, place, and time.    Mood and affect: Normal.       Assessment/Plan        #Acute pulmonary embolism  Eliquis failure  Start heparin drip (0.2  We will check a Doppler ultrasound lower extremities  Check echocardiogram  Likely transition to Xarelto    The patient has all long history of blood clots  Has had hypercoagulable work-up in the past and the work-up was negative as per the patient  Positive family history of blood clots in father    #Hypertension  Stable continue home medications    #Dyslipidemia  Stable continue home medications    #Depression  Stable resume home medications    #Chronic back pain  Resume gabapentin

## 2023-10-12 NOTE — PROGRESS NOTES
10/12/23 1444   [facility] Disability Status   Are you deaf or do you have serious difficulty hearing? N   Are you blind or do you have serious difficulty seeing, even when wearing glasses? N   Because of a physical, mental, or emotional condition, do you have serious difficulty concentrating, remembering, or making decisions? (5 years old or older) N   Do you have serious difficulty walking or climbing stairs? N   Do you have serious difficulty dressing or bathing? N   Because of a physical, mental, or emotional condition, do you have serious difficulty doing errands alone such as visiting the doctor? N

## 2023-10-12 NOTE — ED PROVIDER NOTES
HPI   Chief Complaint   Patient presents with    Chest Pain       This is a 62-year-old male who presents to the emergency department complaining of chest pain.  The patient reports that he was diagnosed with COVID 2 weeks ago.  Since that time he has had chest pain.  The chest pain had been steadily decreasing.  Today, however, the pain was the same as yesterday and it did not decrease.  He reports mild shortness of breath with exertion.  He reports a history of PE in 2012 and a second PE 2 to 3 years ago.  He reports mild tingling in his left arm at times.  He had pain in his back last week but none today.  He denies nausea and vomiting.  He denies fever.  He reports a cough which is mostly nonproductive.      History provided by:  Patient and significant other   used: No                        No data recorded                Patient History   Past Medical History:   Diagnosis Date    Unspecified cataract     Cataracts, both eyes     Past Surgical History:   Procedure Laterality Date    OTHER SURGICAL HISTORY  01/17/2019    Appendectomy    OTHER SURGICAL HISTORY  01/17/2019    Tonsillectomy    OTHER SURGICAL HISTORY  12/04/2020    Knee replacement    OTHER SURGICAL HISTORY  12/14/2022    Corneal lasik    OTHER SURGICAL HISTORY  02/13/2020    Sleeve gastrectomy     No family history on file.  Social History     Tobacco Use    Smoking status: Never    Smokeless tobacco: Never   Substance Use Topics    Alcohol use: Not on file    Drug use: Not on file       Physical Exam   ED Triage Vitals [10/12/23 1029]   Temp Heart Rate Resp BP   37.2 °C (99 °F) 86 19 124/87      SpO2 Temp src Heart Rate Source Patient Position   98 % -- -- --      BP Location FiO2 (%)     -- --       Physical Exam  Vitals and nursing note reviewed.   HENT:      Head: Normocephalic and atraumatic.      Nose: Nose normal.   Eyes:      Conjunctiva/sclera: Conjunctivae normal.   Cardiovascular:      Rate and Rhythm: Normal rate  and regular rhythm.      Pulses: Normal pulses.      Heart sounds: Normal heart sounds.   Pulmonary:      Effort: Pulmonary effort is normal.      Breath sounds: Normal breath sounds.   Abdominal:      General: Bowel sounds are normal.      Palpations: Abdomen is soft.   Musculoskeletal:         General: Normal range of motion.      Cervical back: Normal range of motion and neck supple.   Skin:     Findings: No rash.   Neurological:      General: No focal deficit present.      Mental Status: He is alert and oriented to person, place, and time.   Psychiatric:         Mood and Affect: Mood normal.         ED Course & [facility]   Diagnoses as of 10/12/23 1615   Other acute pulmonary embolism, unspecified whether acute cor pulmonale present (CMS/HCC)   Acute pulmonary embolism, unspecified pulmonary embolism type, unspecified whether acute cor pulmonale present (CMS/HCC)       Medical Decision Making  Differential diagnosis: I have considered the following conditions in my assessment of   this patient's condition:  GERD, musculoskeletal chest pain, aortic dissection, cholecystitis,   ACS, pericarditis, myocarditis, tamponade, shingles,  pneumonia,   pneumothorax, pulmonary embolus.    This is a 62-year-old male who presents to the emergency department with chest pain and mild shortness of breath.  He has a history of PE.  He is anticoagulated with Eliquis.  He was evaluated with labs, CT and EKG.  Labs showed an elevated D-dimer of 1600.  CT scan shows new acute PE in the right middle and right lower lobe as discussed with the radiologist.  The patient has failed Eliquis.  Heparin drip was ordered.  Patient requires admission for further evaluation and management.    Amount and/or Complexity of Data Reviewed  Labs: ordered. Decision-making details documented in ED Course.  Radiology: ordered. Decision-making details documented in ED Course.  ECG/medicine tests: independent interpretation performed.     Details: Normal sinus  rhythm, heart rate 84, normal axis, no ST elevation.  Discussion of management or test interpretation with external provider(s): Dr. Gallardo who accepted the patient for admission.    Risk  Decision regarding hospitalization.        Procedure  Critical Care    Performed by: Nir Branch MD  Authorized by: Nir Branch MD    Critical care provider statement:     Critical care time (minutes):  35    Critical care time was exclusive of:  Separately billable procedures and treating other patients    Critical care was necessary to treat or prevent imminent or life-threatening deterioration of the following conditions:  Respiratory failure    Critical care was time spent personally by me on the following activities:  Examination of patient, obtaining history from patient or surrogate, ordering and review of radiographic studies, ordering and review of laboratory studies and development of treatment plan with patient or surrogate    I assumed direction of critical care for this patient from another provider in my specialty: no      Care discussed with: admitting provider         Nir Branch MD  10/12/23 1447

## 2023-10-12 NOTE — PROGRESS NOTES
Transitional Care Coordination Progress Note:  Plan per Medical/Surgical team: treatment of PE with heparin gtt  Status: ED  Payor source: [employer]  Discharge disposition: home with wife  Potential Barriers: failed Eliquis?  ADOD: 10/14/2023  DIETER Dennis RN, BSN Transitional Care Coordinator ED# [phone]      10/12/23 1444   Discharge Planning   Living Arrangements Spouse/significant other   Support Systems Spouse/significant other   Assistance Needed anti-coagulation, ?failed Eliquis   Type of Residence Private residence   Number of Stairs to Enter Residence 3   Number of Stairs Within Residence 13   Home or Post Acute Services None   Patient expects to be discharged to: home with wife   Does the patient need discharge transport arranged? No   Financial Resource Strain   How hard is it for you to pay for the very basics like food, housing, medical care, and heating? Not hard   Housing Stability   In the last 12 months, was there a time when you were not able to pay the mortgage or rent on time? N   In the last 12 months, how many places have you lived? 1   In the last 12 months, was there a time when you did not have a steady place to sleep or slept in a shelter (including now)? N   Transportation Needs   In the past 12 months, has lack of transportation kept you from medical appointments or from getting medications? no   In the past 12 months, has lack of transportation kept you from meetings, work, or from getting things needed for daily living? No

## 2023-10-12 NOTE — PROGRESS NOTES
Pharmacy Medication History Review    Cj Vargas is a 62 y.o. male admitted for Acute pulmonary embolism, unspecified pulmonary embolism type, unspecified whether acute cor pulmonale present (CMS/[facility]). Pharmacy reviewed the patient's [id number] medications and allergies for accuracy.    The list below reflectives the updated PTA list. Please review each medication in order reconciliation for additional clarification and justification.  Prior to Admission Medications   Prescriptions Last Dose Informant Patient Reported? Taking?   QUEtiapine (SEROquel) 50 mg tablet 10/12/2023  No No   Sig: Take 1 tablet (50 mg) by mouth 2 times a day.   apixaban (Eliquis) 5 mg tablet 10/12/2023  No No   Sig: Take 1 tablet (5 mg) by mouth 2 times a day.   buPROPion (Wellbutrin) 100 mg tablet 10/12/2023  No No   Sig: Take 1 tablet (100 mg) by mouth 3 times a day.   cholecalciferol, vitamin D3, 250 mcg (10,000 unit) tablet 10/11/2023  Yes No   Sig: Take 1 tablet (250 mcg) by mouth every other day.   gabapentin (Neurontin) 600 mg tablet 10/12/2023  No No   Sig: Take 1 tablet (600 mg) by mouth 3 times a day.   irbesartan-hydrochlorothiazide (Avalide) 150-12.5 mg tablet 10/12/2023  No No   Sig: Take 1 tablet by mouth once daily.   meloxicam (Mobic) 15 mg tablet 10/11/2023  Yes No   Sig: Take 1 tablet (15 mg) by mouth once daily as needed.   omeprazole (PriLOSEC) 40 mg DR capsule 10/12/2023  No No   Sig: Take 1 capsule (40 mg) by mouth once daily.   prasterone, dhea, 50 mg tablet 10/12/2023  Yes No   Si tab(s) orally 2 times a day   rosuvastatin (Crestor) 10 mg tablet 10/12/2023  Yes No   Sig: Take 1 tablet daily   tadalafil 20 mg tablet 10/12/2023  No No   Sig: Take 1 tablet (20 mg) by mouth once daily as needed for erectile dysfunction (sexual activity).   tiZANidine (Zanaflex) 4 mg tablet 10/11/2023  No No   Sig: Take 1 tablet (4 mg) by mouth 3 times a day.   Patient taking differently: Take 1 tablet (4 mg) by mouth 3  times a day as needed.   vitamins V6-H7-O3-B12-protease (B-Complex With B-12) 2.5 mg-2.5 mg- 5 mg-100 mcg tablet 10/12/2023  Yes No   Sig: TAKE 1 TABLET DAILY.      Facility-Administered Medications: None           The list below reflectives the updated allergy list. Please review each documented allergy for additional clarification and justification.  Allergies  Reviewed by Devendra Chung RN on 10/12/2023   No Known Allergies         Below are additional concerns with the patient's PTA list.      Alison Weston CPhT

## 2023-10-12 NOTE — PROGRESS NOTES
Home with wife     10/12/23 9300   Current Planned Discharge Disposition   Current Planned Discharge Disposition Home

## 2023-10-12 NOTE — CARE PLAN
The patient's goals for the shift include      The clinical goals for the shift include decreased pain    Over the shift, the patient did not make progress toward the following goals.

## 2023-10-12 NOTE — PROGRESS NOTES
10/12/23 1447   Physical Activity   On average, how many days per week do you engage in moderate to strenuous exercise (like a brisk walk)? 0 days   On average, how many minutes do you engage in exercise at this level? 0 min   Financial Resource Strain   How hard is it for you to pay for the very basics like food, housing, medical care, and heating? Not hard   Housing Stability   In the last 12 months, was there a time when you were not able to pay the mortgage or rent on time? N   In the last 12 months, how many places have you lived? 1   In the last 12 months, was there a time when you did not have a steady place to sleep or slept in a shelter (including now)? N   Transportation Needs   In the past 12 months, has lack of transportation kept you from medical appointments or from getting medications? no   In the past 12 months, has lack of transportation kept you from meetings, work, or from getting things needed for daily living? No   Food Insecurity   Within the past 12 months, you worried that your food would run out before you got the money to buy more. Never true   Within the past 12 months, the food you bought just didn't last and you didn't have money to get more. Never true   Stress   Do you feel stress - tense, restless, nervous, or anxious, or unable to sleep at night because your mind is troubled all the time - these days? To some exte   Social Connections   In a typical week, how many times do you talk on the phone with family, friends, or neighbors? More than 3   How often do you get together with friends or relatives? Three times   How often do you attend Taoism or [employer] services? Never   Do you belong to any clubs or organizations such as Taoism groups, unions, fraternal or athletic groups, or school groups? No   How often do you attend meetings of the clubs or organizations you belong to? Never   Are you , , , , never , or living with a partner?     Intimate Partner Violence   Within the last year, have you been afraid of your partner or ex-partner? No   Within the last year, have you been humiliated or emotionally abused in other ways by your partner or ex-partner? No   Within the last year, have you been kicked, hit, slapped, or otherwise physically hurt by your partner or ex-partner? No   Within the last year, have you been raped or forced to have any kind of sexual activity by your partner or ex-partner? No   Alcohol Use   Q1: How often do you have a drink containing alcohol? Monthly or l   Q2: How many drinks containing alcohol do you have on a typical day when you are drinking? 1 or 2   Q3: How often do you have six or more drinks on one occasion? Never   Utilities   In the past 12 months has the electric, gas, oil, or water company threatened to shut off services in your home? No

## 2023-10-12 NOTE — ED TRIAGE NOTES
PT TO ED FROM HOME WITH C/O CP FOR 2 WKS FOLLOWING COVID POS RESULT 2 WKS PRIOR. PT ALSO ADMITS TO BEING ON BLOOD THINNERS FOR BLOOD CLOTS.

## 2023-10-13 ENCOUNTER — APPOINTMENT (OUTPATIENT)
Dept: CARDIOLOGY | Facility: HOSPITAL | Age: 63
DRG: 176 | End: 2023-10-13
Payer: COMMERCIAL

## 2023-10-13 LAB
ANION GAP SERPL CALC-SCNC: 13 MMOL/L (ref 10–20)
BUN SERPL-MCNC: 11 MG/DL (ref 6–23)
CALCIUM SERPL-MCNC: 9.1 MG/DL (ref 8.6–10.3)
CHLORIDE SERPL-SCNC: 100 MMOL/L (ref 98–107)
CO2 SERPL-SCNC: 28 MMOL/L (ref 21–32)
CREAT SERPL-MCNC: 1.03 MG/DL (ref 0.5–1.3)
EJECTION FRACTION APICAL 4 CHAMBER: 62.8
ERYTHROCYTE [DISTWIDTH] IN BLOOD BY AUTOMATED COUNT: 11.6 % (ref 11.5–14.5)
ERYTHROCYTE [DISTWIDTH] IN BLOOD BY AUTOMATED COUNT: 11.6 % (ref 11.5–14.5)
GFR SERPL CREATININE-BSD FRML MDRD: 82 ML/MIN/1.73M*2
GLUCOSE SERPL-MCNC: 123 MG/DL (ref 74–99)
HCT VFR BLD AUTO: 43 % (ref 41–52)
HCT VFR BLD AUTO: 43.2 % (ref 41–52)
HGB BLD-MCNC: 14.5 G/DL (ref 13.5–17.5)
HGB BLD-MCNC: 14.7 G/DL (ref 13.5–17.5)
MCH RBC QN AUTO: 32.7 PG (ref 26–34)
MCH RBC QN AUTO: 32.7 PG (ref 26–34)
MCHC RBC AUTO-ENTMCNC: 33.7 G/DL (ref 32–36)
MCHC RBC AUTO-ENTMCNC: 34 G/DL (ref 32–36)
MCV RBC AUTO: 96 FL (ref 80–100)
MCV RBC AUTO: 97 FL (ref 80–100)
NRBC BLD-RTO: 0 /100 WBCS (ref 0–0)
NRBC BLD-RTO: 0 /100 WBCS (ref 0–0)
PLATELET # BLD AUTO: 259 X10*3/UL (ref 150–450)
PLATELET # BLD AUTO: 291 X10*3/UL (ref 150–450)
PMV BLD AUTO: 9.7 FL (ref 7.5–11.5)
PMV BLD AUTO: 9.8 FL (ref 7.5–11.5)
POTASSIUM SERPL-SCNC: 4 MMOL/L (ref 3.5–5.3)
RBC # BLD AUTO: 4.44 X10*6/UL (ref 4.5–5.9)
RBC # BLD AUTO: 4.49 X10*6/UL (ref 4.5–5.9)
SODIUM SERPL-SCNC: 137 MMOL/L (ref 136–145)
UFH PPP CHRO-ACNC: 0.1 IU/ML
UFH PPP CHRO-ACNC: 0.4 IU/ML
UFH PPP CHRO-ACNC: 0.5 IU/ML
UFH PPP CHRO-ACNC: 1.4 IU/ML
WBC # BLD AUTO: 5.5 X10*3/UL (ref 4.4–11.3)
WBC # BLD AUTO: 6.3 X10*3/UL (ref 4.4–11.3)

## 2023-10-13 PROCEDURE — 2500000004 HC RX 250 GENERAL PHARMACY W/ HCPCS (ALT 636 FOR OP/ED): Performed by: EMERGENCY MEDICINE

## 2023-10-13 PROCEDURE — 85027 COMPLETE CBC AUTOMATED: CPT | Performed by: INTERNAL MEDICINE

## 2023-10-13 PROCEDURE — 36415 COLL VENOUS BLD VENIPUNCTURE: CPT | Performed by: INTERNAL MEDICINE

## 2023-10-13 PROCEDURE — 85520 HEPARIN ASSAY: CPT | Performed by: INTERNAL MEDICINE

## 2023-10-13 PROCEDURE — 93306 TTE W/DOPPLER COMPLETE: CPT

## 2023-10-13 PROCEDURE — 93306 TTE W/DOPPLER COMPLETE: CPT | Performed by: INTERNAL MEDICINE

## 2023-10-13 PROCEDURE — 80048 BASIC METABOLIC PNL TOTAL CA: CPT | Performed by: INTERNAL MEDICINE

## 2023-10-13 PROCEDURE — 2500000001 HC RX 250 WO HCPCS SELF ADMINISTERED DRUGS (ALT 637 FOR MEDICARE OP): Performed by: INTERNAL MEDICINE

## 2023-10-13 PROCEDURE — 1200000002 HC GENERAL ROOM WITH TELEMETRY DAILY

## 2023-10-13 PROCEDURE — 2500000004 HC RX 250 GENERAL PHARMACY W/ HCPCS (ALT 636 FOR OP/ED)

## 2023-10-13 PROCEDURE — 2500000004 HC RX 250 GENERAL PHARMACY W/ HCPCS (ALT 636 FOR OP/ED): Performed by: INTERNAL MEDICINE

## 2023-10-13 RX ADMIN — BUPROPION HYDROCHLORIDE 100 MG: 100 TABLET, FILM COATED ORAL at 14:02

## 2023-10-13 RX ADMIN — HEPARIN SODIUM 2000 UNITS/HR: 10000 INJECTION, SOLUTION INTRAVENOUS at 00:24

## 2023-10-13 RX ADMIN — BUPROPION HYDROCHLORIDE 100 MG: 100 TABLET, FILM COATED ORAL at 09:46

## 2023-10-13 RX ADMIN — OXYCODONE HYDROCHLORIDE 5 MG: 5 TABLET ORAL at 18:53

## 2023-10-13 RX ADMIN — OXYCODONE HYDROCHLORIDE 5 MG: 5 TABLET ORAL at 12:33

## 2023-10-13 RX ADMIN — GABAPENTIN 600 MG: 300 CAPSULE ORAL at 21:05

## 2023-10-13 RX ADMIN — PANTOPRAZOLE SODIUM 40 MG: 40 TABLET, DELAYED RELEASE ORAL at 06:15

## 2023-10-13 RX ADMIN — BUPROPION HYDROCHLORIDE 100 MG: 100 TABLET, FILM COATED ORAL at 21:05

## 2023-10-13 RX ADMIN — HEPARIN SODIUM 1600 UNITS/HR: 10000 INJECTION, SOLUTION INTRAVENOUS at 21:05

## 2023-10-13 RX ADMIN — QUETIAPINE FUMARATE 50 MG: 50 TABLET ORAL at 21:05

## 2023-10-13 RX ADMIN — HEPARIN SODIUM 10000 UNITS: 5000 INJECTION INTRAVENOUS; SUBCUTANEOUS at 00:27

## 2023-10-13 RX ADMIN — OXYCODONE HYDROCHLORIDE 5 MG: 5 TABLET ORAL at 06:15

## 2023-10-13 RX ADMIN — ROSUVASTATIN 10 MG: 10 TABLET, FILM COATED ORAL at 09:46

## 2023-10-13 RX ADMIN — GABAPENTIN 600 MG: 300 CAPSULE ORAL at 14:02

## 2023-10-13 RX ADMIN — PERFLUTREN 1.5 ML OF DILUTION: 6.52 INJECTION, SUSPENSION INTRAVENOUS at 10:49

## 2023-10-13 RX ADMIN — GABAPENTIN 600 MG: 300 CAPSULE ORAL at 06:15

## 2023-10-13 ASSESSMENT — COGNITIVE AND FUNCTIONAL STATUS - GENERAL
MOBILITY SCORE: 24
DAILY ACTIVITIY SCORE: 24

## 2023-10-13 ASSESSMENT — PAIN SCALES - GENERAL
PAINLEVEL_OUTOF10: 7
PAINLEVEL_OUTOF10: 8
PAINLEVEL_OUTOF10: 3

## 2023-10-13 NOTE — PROGRESS NOTES
INPATIENT PROGRESS NOTES    PRIMARY SERVICE: Galdino Cheung MD         10/13/2023  9 41 am    INTERVAL HPI: Pt feels OK,     Getting echo  No new complaints  No chest pain  No leg pains    Tells me his wife reminded him that he did miss 2-3 days of Eliquis while ill w covid     PERTINENT ROS:  REVIEW OF SYSTEMS  GENERAL: negative for fever, SEE HPI  RESPIRATORY: Negative for cough, wheezing or shortness of breath.  CARDIOVASCULAR: Negative for chest pain, leg swelling or palpitations.  GI: Negative for abdominal discomfort, blood in stools or black stools or change in bowel habits  NEURO: no change per nursing  All other reviewed and negative other than HPI.      MEDICATIONS:    No current facility-administered medications on file prior to encounter.     Current Outpatient Medications on File Prior to Encounter   Medication Sig Dispense Refill    apixaban (Eliquis) 5 mg tablet Take 1 tablet (5 mg) by mouth 2 times a day. 180 tablet 1    buPROPion (Wellbutrin) 100 mg tablet Take 1 tablet (100 mg) by mouth 3 times a day. 270 tablet 1    cholecalciferol, vitamin D3, 250 mcg (10,000 unit) tablet Take 1 tablet (250 mcg) by mouth every other day.      gabapentin (Neurontin) 600 mg tablet Take 1 tablet (600 mg) by mouth 3 times a day. 270 tablet 1    irbesartan-hydrochlorothiazide (Avalide) 150-12.5 mg tablet Take 1 tablet by mouth once daily. 90 tablet 1    meloxicam (Mobic) 15 mg tablet Take 1 tablet (15 mg) by mouth once daily as needed.      omeprazole (PriLOSEC) 40 mg DR capsule Take 1 capsule (40 mg) by mouth once daily. 90 capsule 1    prasterone, dhea, 50 mg tablet 1 tab(s) orally 2 times a day      QUEtiapine (SEROquel) 50 mg tablet Take 1 tablet (50 mg) by mouth 2 times a day. 90 tablet 0    rosuvastatin (Crestor) 10 mg tablet Take 1 tablet daily      tadalafil 20 mg tablet Take 1 tablet (20 mg) by mouth once daily as needed for erectile dysfunction (sexual activity). 10 tablet 3    tiZANidine (Zanaflex) 4 mg  tablet Take 1 tablet (4 mg) by mouth 3 times a day. (Patient taking differently: Take 1 tablet (4 mg) by mouth 3 times a day as needed.) 30 tablet 0    vitamins S6-U9-L7-B12-protease (B-Complex With B-12) 2.5 mg-2.5 mg- 5 mg-100 mcg tablet TAKE 1 TABLET DAILY.           PHYSICAL EXAM:   Vitals:    10/12/23 1900 10/13/23 0004 10/13/23 0232 10/13/23 0739   BP: 132/85 112/72 111/63 109/72   BP Location:  Right arm Right arm    Patient Position: Sitting Lying Lying Lying   Pulse: 74 72 76 75   Resp:  18 18 18   Temp: 35.9 °C (96.6 °F) 36.3 °C (97.4 °F) 36.6 °C (97.9 °F) 36.1 °C (97 °F)   TempSrc: Temporal Tympanic Oral Skin   SpO2: 96% 97% 96% 95%   Weight:       Height:            PHYSICAL EXAMINATION:    General appearance: well-hydrated, well nourished  Skin: skin color, texture, turgor normal,   HEENT: Anicteric sclera.  Oropharynx mucosa moist  Neck: Supple, no adenopathy;   Back: no pain to palpation over spine or costovertebral angles,   Lungs: clear to auscultation, no wheezing or rhonchi  Heart: RRR without murmur, gallop, or rubs.   Abdomen: Abdomen soft, non-tender. Bowel sounds normal. No masses, organomegaly  Extremities: Extremities normal. No  edema, or skin discoloration.   Musculoskeletal: Spine range of motion normal. Muscular strength intact  Neuro: Oriented X  3    DATA: CBC, Coags, BMP, Mg, Phos   Results for orders placed or performed during the hospital encounter of 10/12/23 (from the past 96 hour(s))   CBC with Differential   Result Value Ref Range    WBC 5.6 4.4 - 11.3 x10*3/uL    nRBC 0.0 0.0 - 0.0 /100 WBCs    RBC 4.58 4.50 - 5.90 x10*6/uL    Hemoglobin 15.0 13.5 - 17.5 g/dL    Hematocrit 44.2 41.0 - 52.0 %    MCV 97 80 - 100 fL    MCH 32.8 26.0 - 34.0 pg    MCHC 33.9 32.0 - 36.0 g/dL    RDW 11.7 11.5 - 14.5 %    Platelets 288 150 - 450 x10*3/uL    MPV 9.8 7.5 - 11.5 fL    Neutrophils % 60.2 40.0 - 80.0 %    Immature Granulocytes %, Automated 0.2 0.0 - 0.9 %    Lymphocytes % 28.6 13.0 - 44.0 %     Monocytes % 7.9 2.0 - 10.0 %    Eosinophils % 2.7 0.0 - 6.0 %    Basophils % 0.4 0.0 - 2.0 %    Neutrophils Absolute 3.37 1.20 - 7.70 x10*3/uL    Immature Granulocytes Absolute, Automated 0.01 0.00 - 0.70 x10*3/uL    Lymphocytes Absolute 1.60 1.20 - 4.80 x10*3/uL    Monocytes Absolute 0.44 0.10 - 1.00 x10*3/uL    Eosinophils Absolute 0.15 0.00 - 0.70 x10*3/uL    Basophils Absolute 0.02 0.00 - 0.10 x10*3/uL   Comprehensive Metabolic Panel   Result Value Ref Range    Glucose 122 (H) 74 - 99 mg/dL    Sodium 136 136 - 145 mmol/L    Potassium 4.0 3.5 - 5.3 mmol/L    Chloride 101 98 - 107 mmol/L    Bicarbonate 28 21 - 32 mmol/L    Anion Gap 11 10 - 20 mmol/L    Urea Nitrogen 10 6 - 23 mg/dL    Creatinine 0.97 0.50 - 1.30 mg/dL    eGFR 88 >60 mL/min/1.73m*2    Calcium 9.5 8.6 - 10.3 mg/dL    Albumin 4.2 3.4 - 5.0 g/dL    Alkaline Phosphatase 47 33 - 136 U/L    Total Protein 7.1 6.4 - 8.2 g/dL    AST 39 9 - 39 U/L    Bilirubin, Total 1.0 0.0 - 1.2 mg/dL    ALT 48 10 - 52 U/L   Troponin I, High Sensitivity   Result Value Ref Range    Troponin I, High Sensitivity 4 0 - 20 ng/L   D-dimer, quantitative   Result Value Ref Range    D-Dimer Non VTE, Quant (ng/mL FEU) 1,637 (H) <=500 ng/mL FEU   Heparin Assay, UFH   Result Value Ref Range    Heparin Unfractionated 0.5 See Comment Below for Therapeutic Ranges IU/mL   Heparin Assay   Result Value Ref Range    Heparin Unfractionated 0.4 See Comment Below for Therapeutic Ranges IU/mL   Urinalysis with Reflex Microscopic   Result Value Ref Range    Color, Urine Yellow Straw, Yellow    Appearance, Urine Clear Clear    Specific Gravity, Urine 1.032 1.005 - 1.035    pH, Urine 6.0 5.0, 5.5, 6.0, 6.5, 7.0, 7.5, 8.0    Protein, Urine NEGATIVE NEGATIVE mg/dL    Glucose, Urine 50 (1+) (A) NEGATIVE mg/dL    Blood, Urine NEGATIVE NEGATIVE    Ketones, Urine NEGATIVE NEGATIVE mg/dL    Bilirubin, Urine NEGATIVE NEGATIVE    Urobilinogen, Urine 4.0 (N) <2.0 mg/dL    Nitrite, Urine NEGATIVE NEGATIVE     Leukocyte Esterase, Urine NEGATIVE NEGATIVE   CBC   Result Value Ref Range    WBC 6.7 4.4 - 11.3 x10*3/uL    nRBC 0.0 0.0 - 0.0 /100 WBCs    RBC 4.70 4.50 - 5.90 x10*6/uL    Hemoglobin 15.4 13.5 - 17.5 g/dL    Hematocrit 45.3 41.0 - 52.0 %    MCV 96 80 - 100 fL    MCH 32.8 26.0 - 34.0 pg    MCHC 34.0 32.0 - 36.0 g/dL    RDW 11.7 11.5 - 14.5 %    Platelets 292 150 - 450 x10*3/uL    MPV 9.7 7.5 - 11.5 fL   Basic metabolic panel   Result Value Ref Range    Glucose 105 (H) 74 - 99 mg/dL    Sodium 135 (L) 136 - 145 mmol/L    Potassium 3.8 3.5 - 5.3 mmol/L    Chloride 102 98 - 107 mmol/L    Bicarbonate 25 21 - 32 mmol/L    Anion Gap 12 10 - 20 mmol/L    Urea Nitrogen 11 6 - 23 mg/dL    Creatinine 1.11 0.50 - 1.30 mg/dL    eGFR 75 >60 mL/min/1.73m*2    Calcium 9.2 8.6 - 10.3 mg/dL   Heparin Assay   Result Value Ref Range    Heparin Unfractionated 0.2 See Comment Below for Therapeutic Ranges IU/mL   Lower extremity venous duplex bilateral   Result Value Ref Range    BSA 2.61 m2   Heparin Assay   Result Value Ref Range    Heparin Unfractionated 1.4 (HH) See Comment Below for Therapeutic Ranges IU/mL   CBC   Result Value Ref Range    WBC 5.5 4.4 - 11.3 x10*3/uL    nRBC 0.0 0.0 - 0.0 /100 WBCs    RBC 4.49 (L) 4.50 - 5.90 x10*6/uL    Hemoglobin 14.7 13.5 - 17.5 g/dL    Hematocrit 43.2 41.0 - 52.0 %    MCV 96 80 - 100 fL    MCH 32.7 26.0 - 34.0 pg    MCHC 34.0 32.0 - 36.0 g/dL    RDW 11.6 11.5 - 14.5 %    Platelets 259 150 - 450 x10*3/uL    MPV 9.8 7.5 - 11.5 fL   Basic metabolic panel   Result Value Ref Range    Glucose 123 (H) 74 - 99 mg/dL    Sodium 137 136 - 145 mmol/L    Potassium 4.0 3.5 - 5.3 mmol/L    Chloride 100 98 - 107 mmol/L    Bicarbonate 28 21 - 32 mmol/L    Anion Gap 13 10 - 20 mmol/L    Urea Nitrogen 11 6 - 23 mg/dL    Creatinine 1.03 0.50 - 1.30 mg/dL    eGFR 82 >60 mL/min/1.73m*2    Calcium 9.1 8.6 - 10.3 mg/dL           ASSESSMENT AND PLAN:     Principal Problem:    Acute pulmonary embolism, unspecified  pulmonary embolism type, unspecified whether acute cor pulmonale present (CMS/[facility])        #Acute pulmonary embolism  Prev felt Eliquis failure  Pt later endorse (after wife reminded him) of missing 2-3 days of Eliquis while ill w covid   C/w heparin drip , will discuss AC further   Await Doppler ultrasound lower extremities  Await echocardiogram       The patient has all long history of blood clots  Has had hypercoagulable work-up in the past and the work-up was negative as per the patient  Positive family history of blood clots in father     #Hypertension  Stable continue home medications     #Dyslipidemia  Stable continue home medications     #Depression  Stable resume home medications     #Chronic back pain  Resume gabapentin    Plan of care discussed with: Provider, RN, Patient.      Patient case and plan of care discussed with Dr. YENNIFER Cheung.    KINGA Daniels - CNP  -In collaboration with Dr. YENNIFER Cheung    [facility]  Office: [phone]  Fax: [phone]  I have reviewed the above note obtained and documented by the NP/PA and I personally participated in the key components. I have discussed the case and management of the patient's care. Changes made to the note, and all key components of history and physical/progress note done by me.  dw nursing  Dw pt, radiology and NP , this evening   Pt with recent covid about 10 days ago, did miss 3 days of eliquis  Does have acute dvt  Will start on eliquis with loading dose and plan dc tor tomorrow  Followup with PCP  Galdino Cheung MD

## 2023-10-13 NOTE — PROGRESS NOTES
10/13/23 1247   Discharge Planning   Living Arrangements Spouse/significant other   Support Systems Spouse/significant other   Assistance Needed TBD- current DOAC or new medication may require insuranc e verification Meds To Beds/ [facility]   Type of Residence Private residence   Number of Stairs to Enter Residence 2   Number of Stairs Within Residence 14   Who is requesting discharge planning? Other (Comment)  (Initial TCC Assessment)   Home or Post Acute Services None   Patient expects to be discharged to: Home   Does the patient need discharge transport arranged? No     TCC met with patient/ wife  at bedside. Introduced self and role to patient and wife. Patient sitting up in chair at desk. He is working on his computer.  He endorses to be independent in ADLS/ IADLS.  At baseline, ambulatory with no use of assistive devices.  He lives with his wife in a two level home with no active Home Care Services.      Patient denies use of home oxygen, no Hemodialysis and no DME. Patient drives self to medical appointments.  Denies issues with obtaining medical prescriptions.  No skilled needs identified at this time.  Patient currently on Eliquis for DVTs.  Awaiting final determination for DOAC, may require insurance verification for prescription.   Wendy DAVIS RN TCC CCM

## 2023-10-13 NOTE — CARE PLAN
The patient's goals for the shift include      The clinical goals for the shift include to start heparin drip    Over the shift, the patient did not make progress toward the following goals. Barriers to progression include   Problem: Fall/Injury  Goal: Not fall by end of shift  Outcome: Progressing  Goal: Be free from injury by end of the shift  Outcome: Progressing  Goal: Verbalize understanding of personal risk factors for fall in the hospital  Outcome: Progressing     Problem: Skin  Goal: Promote/optimize nutrition  Outcome: Progressing   . Recommendations to address these barriers include.

## 2023-10-14 VITALS
WEIGHT: 291.01 LBS | SYSTOLIC BLOOD PRESSURE: 115 MMHG | HEIGHT: 73 IN | HEART RATE: 81 BPM | OXYGEN SATURATION: 97 % | TEMPERATURE: 98.5 F | DIASTOLIC BLOOD PRESSURE: 74 MMHG | BODY MASS INDEX: 38.57 KG/M2 | RESPIRATION RATE: 15 BRPM

## 2023-10-14 LAB
ANION GAP SERPL CALC-SCNC: 13 MMOL/L (ref 10–20)
BUN SERPL-MCNC: 11 MG/DL (ref 6–23)
CALCIUM SERPL-MCNC: 9.1 MG/DL (ref 8.6–10.3)
CHLORIDE SERPL-SCNC: 100 MMOL/L (ref 98–107)
CO2 SERPL-SCNC: 28 MMOL/L (ref 21–32)
CREAT SERPL-MCNC: 1.06 MG/DL (ref 0.5–1.3)
ERYTHROCYTE [DISTWIDTH] IN BLOOD BY AUTOMATED COUNT: 11.5 % (ref 11.5–14.5)
GFR SERPL CREATININE-BSD FRML MDRD: 79 ML/MIN/1.73M*2
GLUCOSE SERPL-MCNC: 143 MG/DL (ref 74–99)
HCT VFR BLD AUTO: 45.3 % (ref 41–52)
HGB BLD-MCNC: 14.8 G/DL (ref 13.5–17.5)
MCH RBC QN AUTO: 32.1 PG (ref 26–34)
MCHC RBC AUTO-ENTMCNC: 32.7 G/DL (ref 32–36)
MCV RBC AUTO: 98 FL (ref 80–100)
NRBC BLD-RTO: 0 /100 WBCS (ref 0–0)
PLATELET # BLD AUTO: 286 X10*3/UL (ref 150–450)
PMV BLD AUTO: 9.9 FL (ref 7.5–11.5)
POTASSIUM SERPL-SCNC: 4.1 MMOL/L (ref 3.5–5.3)
RBC # BLD AUTO: 4.61 X10*6/UL (ref 4.5–5.9)
SODIUM SERPL-SCNC: 137 MMOL/L (ref 136–145)
WBC # BLD AUTO: 5.3 X10*3/UL (ref 4.4–11.3)

## 2023-10-14 PROCEDURE — 2500000001 HC RX 250 WO HCPCS SELF ADMINISTERED DRUGS (ALT 637 FOR MEDICARE OP): Performed by: FAMILY MEDICINE

## 2023-10-14 PROCEDURE — 80048 BASIC METABOLIC PNL TOTAL CA: CPT | Performed by: INTERNAL MEDICINE

## 2023-10-14 PROCEDURE — 2500000004 HC RX 250 GENERAL PHARMACY W/ HCPCS (ALT 636 FOR OP/ED): Performed by: INTERNAL MEDICINE

## 2023-10-14 PROCEDURE — 36415 COLL VENOUS BLD VENIPUNCTURE: CPT | Performed by: INTERNAL MEDICINE

## 2023-10-14 PROCEDURE — 85027 COMPLETE CBC AUTOMATED: CPT | Performed by: INTERNAL MEDICINE

## 2023-10-14 PROCEDURE — 2500000001 HC RX 250 WO HCPCS SELF ADMINISTERED DRUGS (ALT 637 FOR MEDICARE OP): Performed by: INTERNAL MEDICINE

## 2023-10-14 RX ORDER — GUAIFENESIN 600 MG/1
600 TABLET, EXTENDED RELEASE ORAL EVERY 12 HOURS PRN
Start: 2023-10-14 | End: 2023-10-25 | Stop reason: ALTCHOICE

## 2023-10-14 RX ORDER — TIZANIDINE 4 MG/1
4 TABLET ORAL 3 TIMES DAILY PRN
Start: 2023-10-14 | End: 2023-10-25 | Stop reason: SDUPTHER

## 2023-10-14 RX ADMIN — ROSUVASTATIN 10 MG: 10 TABLET, FILM COATED ORAL at 10:25

## 2023-10-14 RX ADMIN — LOSARTAN POTASSIUM 50 MG: 50 TABLET, FILM COATED ORAL at 10:25

## 2023-10-14 RX ADMIN — OXYCODONE HYDROCHLORIDE 5 MG: 5 TABLET ORAL at 06:18

## 2023-10-14 RX ADMIN — BUPROPION HYDROCHLORIDE 100 MG: 100 TABLET, FILM COATED ORAL at 10:25

## 2023-10-14 RX ADMIN — PANTOPRAZOLE SODIUM 40 MG: 40 TABLET, DELAYED RELEASE ORAL at 06:18

## 2023-10-14 RX ADMIN — APIXABAN 10 MG: 5 TABLET, FILM COATED ORAL at 10:25

## 2023-10-14 RX ADMIN — HYDROCHLOROTHIAZIDE 12.5 MG: 12.5 TABLET ORAL at 10:25

## 2023-10-14 RX ADMIN — GABAPENTIN 600 MG: 300 CAPSULE ORAL at 06:18

## 2023-10-14 ASSESSMENT — PAIN SCALES - GENERAL: PAINLEVEL_OUTOF10: 7

## 2023-10-14 NOTE — CARE PLAN
The patient's goals for the shift include      The clinical goals for the shift include monitor for s/s of bleeding    Over the shift, the patient did not make progress toward the following goals. Barriers to progression include   Problem: Fall/Injury  Goal: Not fall by end of shift  Outcome: Progressing  Goal: Be free from injury by end of the shift  Outcome: Progressing  Goal: Verbalize understanding of personal risk factors for fall in the hospital  Outcome: Progressing   . Recommendations to address these barriers include .

## 2023-10-14 NOTE — PROGRESS NOTES
INPATIENT PROGRESS NOTES    PRIMARY SERVICE: Galdino Cheung MD         10/14/2023       INTERVAL HPI: Pt feels OK,     No pain   No difficulty breathing    Tells me his wife reminded him that he did miss 2-3 days of Eliquis while ill w covid     PERTINENT ROS:  REVIEW OF SYSTEMS  GENERAL: negative for fever, SEE HPI  RESPIRATORY: Negative for cough, wheezing or shortness of breath.  CARDIOVASCULAR: Negative for chest pain, leg swelling or palpitations.  GI: Negative for abdominal discomfort, blood in stools or black stools or change in bowel habits  NEURO: no change per nursing  All other reviewed and negative other than HPI.      MEDICATIONS:    No current facility-administered medications on file prior to encounter.     Current Outpatient Medications on File Prior to Encounter   Medication Sig Dispense Refill    apixaban (Eliquis) 5 mg tablet Take 1 tablet (5 mg) by mouth 2 times a day. 180 tablet 1    buPROPion (Wellbutrin) 100 mg tablet Take 1 tablet (100 mg) by mouth 3 times a day. 270 tablet 1    cholecalciferol, vitamin D3, 250 mcg (10,000 unit) tablet Take 1 tablet (250 mcg) by mouth every other day.      gabapentin (Neurontin) 600 mg tablet Take 1 tablet (600 mg) by mouth 3 times a day. 270 tablet 1    irbesartan-hydrochlorothiazide (Avalide) 150-12.5 mg tablet Take 1 tablet by mouth once daily. 90 tablet 1    meloxicam (Mobic) 15 mg tablet Take 1 tablet (15 mg) by mouth once daily as needed.      omeprazole (PriLOSEC) 40 mg DR capsule Take 1 capsule (40 mg) by mouth once daily. 90 capsule 1    prasterone, dhea, 50 mg tablet 1 tab(s) orally 2 times a day      QUEtiapine (SEROquel) 50 mg tablet Take 1 tablet (50 mg) by mouth 2 times a day. 90 tablet 0    rosuvastatin (Crestor) 10 mg tablet Take 1 tablet daily      tadalafil 20 mg tablet Take 1 tablet (20 mg) by mouth once daily as needed for erectile dysfunction (sexual activity). 10 tablet 3    vitamins M6-X1-O6-B12-protease (B-Complex With B-12) 2.5 mg-2.5  mg- 5 mg-100 mcg tablet TAKE 1 TABLET DAILY.      [DISCONTINUED] tiZANidine (Zanaflex) 4 mg tablet Take 1 tablet (4 mg) by mouth 3 times a day. (Patient taking differently: Take 1 tablet (4 mg) by mouth 3 times a day as needed.) 30 tablet 0         PHYSICAL EXAM:   Vitals:    10/13/23 2100 10/14/23 0100 10/14/23 0426 10/14/23 0743   BP: 119/72 113/67 106/60 115/74   BP Location:    Right arm   Patient Position: Lying Lying Lying Sitting   Pulse:    81   Resp: 18 19 18 15   Temp: 36.7 °C (98 °F) 36.2 °C (97.2 °F) 36.7 °C (98.1 °F) 36.9 °C (98.5 °F)   TempSrc:    Oral   SpO2:  96% 96% 97%   Weight:       Height:            PHYSICAL EXAMINATION:    General appearance: well-hydrated, well nourished  Skin: skin color, texture, turgor normal,   HEENT: Anicteric sclera.  Oropharynx mucosa moist  Neck: Supple, no adenopathy;   Back: no pain to palpation over spine or costovertebral angles,   Lungs: clear to auscultation, no wheezing or rhonchi  Heart: RRR without murmur, gallop, or rubs.   Abdomen: Abdomen soft, non-tender. Bowel sounds normal. No masses, organomegaly  Extremities: Extremities normal. No  edema, or skin discoloration.   Musculoskeletal: Spine range of motion normal. Muscular strength intact  Neuro: Oriented X  3    DATA: CBC, Coags, BMP, Mg, Phos   Results for orders placed or performed during the hospital encounter of 10/12/23 (from the past 96 hour(s))   CBC with Differential   Result Value Ref Range    WBC 5.6 4.4 - 11.3 x10*3/uL    nRBC 0.0 0.0 - 0.0 /100 WBCs    RBC 4.58 4.50 - 5.90 x10*6/uL    Hemoglobin 15.0 13.5 - 17.5 g/dL    Hematocrit 44.2 41.0 - 52.0 %    MCV 97 80 - 100 fL    MCH 32.8 26.0 - 34.0 pg    MCHC 33.9 32.0 - 36.0 g/dL    RDW 11.7 11.5 - 14.5 %    Platelets 288 150 - 450 x10*3/uL    MPV 9.8 7.5 - 11.5 fL    Neutrophils % 60.2 40.0 - 80.0 %    Immature Granulocytes %, Automated 0.2 0.0 - 0.9 %    Lymphocytes % 28.6 13.0 - 44.0 %    Monocytes % 7.9 2.0 - 10.0 %    Eosinophils % 2.7 0.0  - 6.0 %    Basophils % 0.4 0.0 - 2.0 %    Neutrophils Absolute 3.37 1.20 - 7.70 x10*3/uL    Immature Granulocytes Absolute, Automated 0.01 0.00 - 0.70 x10*3/uL    Lymphocytes Absolute 1.60 1.20 - 4.80 x10*3/uL    Monocytes Absolute 0.44 0.10 - 1.00 x10*3/uL    Eosinophils Absolute 0.15 0.00 - 0.70 x10*3/uL    Basophils Absolute 0.02 0.00 - 0.10 x10*3/uL   Comprehensive Metabolic Panel   Result Value Ref Range    Glucose 122 (H) 74 - 99 mg/dL    Sodium 136 136 - 145 mmol/L    Potassium 4.0 3.5 - 5.3 mmol/L    Chloride 101 98 - 107 mmol/L    Bicarbonate 28 21 - 32 mmol/L    Anion Gap 11 10 - 20 mmol/L    Urea Nitrogen 10 6 - 23 mg/dL    Creatinine 0.97 0.50 - 1.30 mg/dL    eGFR 88 >60 mL/min/1.73m*2    Calcium 9.5 8.6 - 10.3 mg/dL    Albumin 4.2 3.4 - 5.0 g/dL    Alkaline Phosphatase 47 33 - 136 U/L    Total Protein 7.1 6.4 - 8.2 g/dL    AST 39 9 - 39 U/L    Bilirubin, Total 1.0 0.0 - 1.2 mg/dL    ALT 48 10 - 52 U/L   Troponin I, High Sensitivity   Result Value Ref Range    Troponin I, High Sensitivity 4 0 - 20 ng/L   D-dimer, quantitative   Result Value Ref Range    D-Dimer Non VTE, Quant (ng/mL FEU) 1,637 (H) <=500 ng/mL FEU   Heparin Assay, UFH   Result Value Ref Range    Heparin Unfractionated 0.5 See Comment Below for Therapeutic Ranges IU/mL   Heparin Assay   Result Value Ref Range    Heparin Unfractionated 0.4 See Comment Below for Therapeutic Ranges IU/mL   Urinalysis with Reflex Microscopic   Result Value Ref Range    Color, Urine Yellow Straw, Yellow    Appearance, Urine Clear Clear    Specific Gravity, Urine 1.032 1.005 - 1.035    pH, Urine 6.0 5.0, 5.5, 6.0, 6.5, 7.0, 7.5, 8.0    Protein, Urine NEGATIVE NEGATIVE mg/dL    Glucose, Urine 50 (1+) (A) NEGATIVE mg/dL    Blood, Urine NEGATIVE NEGATIVE    Ketones, Urine NEGATIVE NEGATIVE mg/dL    Bilirubin, Urine NEGATIVE NEGATIVE    Urobilinogen, Urine 4.0 (N) <2.0 mg/dL    Nitrite, Urine NEGATIVE NEGATIVE    Leukocyte Esterase, Urine NEGATIVE NEGATIVE   CBC    Result Value Ref Range    WBC 6.7 4.4 - 11.3 x10*3/uL    nRBC 0.0 0.0 - 0.0 /100 WBCs    RBC 4.70 4.50 - 5.90 x10*6/uL    Hemoglobin 15.4 13.5 - 17.5 g/dL    Hematocrit 45.3 41.0 - 52.0 %    MCV 96 80 - 100 fL    MCH 32.8 26.0 - 34.0 pg    MCHC 34.0 32.0 - 36.0 g/dL    RDW 11.7 11.5 - 14.5 %    Platelets 292 150 - 450 x10*3/uL    MPV 9.7 7.5 - 11.5 fL   Basic metabolic panel   Result Value Ref Range    Glucose 105 (H) 74 - 99 mg/dL    Sodium 135 (L) 136 - 145 mmol/L    Potassium 3.8 3.5 - 5.3 mmol/L    Chloride 102 98 - 107 mmol/L    Bicarbonate 25 21 - 32 mmol/L    Anion Gap 12 10 - 20 mmol/L    Urea Nitrogen 11 6 - 23 mg/dL    Creatinine 1.11 0.50 - 1.30 mg/dL    eGFR 75 >60 mL/min/1.73m*2    Calcium 9.2 8.6 - 10.3 mg/dL   Heparin Assay   Result Value Ref Range    Heparin Unfractionated 0.2 See Comment Below for Therapeutic Ranges IU/mL   Heparin Assay   Result Value Ref Range    Heparin Unfractionated 1.4 (HH) See Comment Below for Therapeutic Ranges IU/mL   CBC   Result Value Ref Range    WBC 5.5 4.4 - 11.3 x10*3/uL    nRBC 0.0 0.0 - 0.0 /100 WBCs    RBC 4.49 (L) 4.50 - 5.90 x10*6/uL    Hemoglobin 14.7 13.5 - 17.5 g/dL    Hematocrit 43.2 41.0 - 52.0 %    MCV 96 80 - 100 fL    MCH 32.7 26.0 - 34.0 pg    MCHC 34.0 32.0 - 36.0 g/dL    RDW 11.6 11.5 - 14.5 %    Platelets 259 150 - 450 x10*3/uL    MPV 9.8 7.5 - 11.5 fL   Basic metabolic panel   Result Value Ref Range    Glucose 123 (H) 74 - 99 mg/dL    Sodium 137 136 - 145 mmol/L    Potassium 4.0 3.5 - 5.3 mmol/L    Chloride 100 98 - 107 mmol/L    Bicarbonate 28 21 - 32 mmol/L    Anion Gap 13 10 - 20 mmol/L    Urea Nitrogen 11 6 - 23 mg/dL    Creatinine 1.03 0.50 - 1.30 mg/dL    eGFR 82 >60 mL/min/1.73m*2    Calcium 9.1 8.6 - 10.3 mg/dL   Heparin Assay   Result Value Ref Range    Heparin Unfractionated 0.1 See Comment Below for Therapeutic Ranges IU/mL   Transthoracic Echo (TTE) Complete   Result Value Ref Range    LV A4C EF 62.8    Heparin Assay   Result Value Ref  Range    Heparin Unfractionated 0.4 See Comment Below for Therapeutic Ranges IU/mL   Heparin Assay   Result Value Ref Range    Heparin Unfractionated 0.5 See Comment Below for Therapeutic Ranges IU/mL   CBC   Result Value Ref Range    WBC 6.3 4.4 - 11.3 x10*3/uL    nRBC 0.0 0.0 - 0.0 /100 WBCs    RBC 4.44 (L) 4.50 - 5.90 x10*6/uL    Hemoglobin 14.5 13.5 - 17.5 g/dL    Hematocrit 43.0 41.0 - 52.0 %    MCV 97 80 - 100 fL    MCH 32.7 26.0 - 34.0 pg    MCHC 33.7 32.0 - 36.0 g/dL    RDW 11.6 11.5 - 14.5 %    Platelets 291 150 - 450 x10*3/uL    MPV 9.7 7.5 - 11.5 fL   CBC   Result Value Ref Range    WBC 5.3 4.4 - 11.3 x10*3/uL    nRBC 0.0 0.0 - 0.0 /100 WBCs    RBC 4.61 4.50 - 5.90 x10*6/uL    Hemoglobin 14.8 13.5 - 17.5 g/dL    Hematocrit 45.3 41.0 - 52.0 %    MCV 98 80 - 100 fL    MCH 32.1 26.0 - 34.0 pg    MCHC 32.7 32.0 - 36.0 g/dL    RDW 11.5 11.5 - 14.5 %    Platelets 286 150 - 450 x10*3/uL    MPV 9.9 7.5 - 11.5 fL   Basic metabolic panel   Result Value Ref Range    Glucose 143 (H) 74 - 99 mg/dL    Sodium 137 136 - 145 mmol/L    Potassium 4.1 3.5 - 5.3 mmol/L    Chloride 100 98 - 107 mmol/L    Bicarbonate 28 21 - 32 mmol/L    Anion Gap 13 10 - 20 mmol/L    Urea Nitrogen 11 6 - 23 mg/dL    Creatinine 1.06 0.50 - 1.30 mg/dL    eGFR 79 >60 mL/min/1.73m*2    Calcium 9.1 8.6 - 10.3 mg/dL           ASSESSMENT AND PLAN:     Principal Problem:    Acute pulmonary embolism, unspecified pulmonary embolism type, unspecified whether acute cor pulmonale present (CMS/HCC)    #Acute pulmonary embolism  Prev felt Eliquis failure  Pt later endorse (after wife reminded him) of missing 2-3 days of Eliquis while ill w covid   C/w heparin drip , will discuss AC further   Await Doppler ultrasound lower extremities  Await echocardiogram  Acute dvt rt pop    Started on eliquis/resume     #Hypertension  Stable continue home medications     #Dyslipidemia  Stable continue home medications     #Depression  Stable resume home medications      #Chronic back pain  Resume gabapentin    Lung nodule folowup with PCP    Plan of care discussed with: Provider, RN, Patient.  And wife     Galdino Cheung MD  Time > 30 min in discharge planning

## 2023-10-14 NOTE — DISCHARGE SUMMARY
Discharge Diagnosis  Acute pulmonary embolism, unspecified pulmonary embolism type, unspecified whether acute cor pulmonale present (CMS/[facility])  DVT    Issues Requiring Follow-Up  Pulmonary nodules less then 4 mm  Pt admitted to hospital for shortness of breath , CT chest does not show acute PE,   Ultrasound of lower ext showing acute DVT rt lower ext  Pt has been started on eliquis   This was not considered eliquis failure as pt had missed 3 doses of eliquis while he had covid,     Pt to followup with PCP    Discharge Meds     Your medication list        ASK your doctor about these medications        Instructions Last Dose Given Next Dose Due   apixaban 5 mg tablet  Commonly known as: Eliquis      Take 1 tablet (5 mg) by mouth 2 times a day.       B-Complex With B-12 2.5 mg-2.5 mg- 5 mg-100 mcg tablet  Generic drug: vitamins J4-C8-B4-B12-protease           buPROPion 100 mg tablet  Commonly known as: Wellbutrin      Take 1 tablet (100 mg) by mouth 3 times a day.       cholecalciferol (vitamin D3) 250 mcg (10,000 unit) tablet           gabapentin 600 mg tablet  Commonly known as: Neurontin      Take 1 tablet (600 mg) by mouth 3 times a day.       irbesartan-hydrochlorothiazide 150-12.5 mg tablet  Commonly known as: Avalide      Take 1 tablet by mouth once daily.       meloxicam 15 mg tablet  Commonly known as: Mobic           omeprazole 40 mg DR capsule  Commonly known as: PriLOSEC      Take 1 capsule (40 mg) by mouth once daily.       prasterone (dhea) 50 mg tablet           QUEtiapine 50 mg tablet  Commonly known as: SEROquel      Take 1 tablet (50 mg) by mouth 2 times a day.       rosuvastatin 10 mg tablet  Commonly known as: Crestor           tadalafil 20 mg tablet  Commonly known as: Cialis      Take 1 tablet (20 mg) by mouth once daily as needed for erectile dysfunction (sexual activity).       tiZANidine 4 mg tablet  Commonly known as: Zanaflex      Take 1 tablet (4 mg) by mouth 3 times a day.                 Test Results Pending At Discharge  Pending Labs       No current pending labs.            Hospital Course   As above    Pertinent Physical Exam At Time of Discharge  Physical Exam    Outpatient Follow-Up  Future Appointments   Date Time Provider Department Center   10/16/2023 10:45 AM Damion Ruiz MD [id number] [facility]         Galdino Cheung MD

## 2023-10-16 ENCOUNTER — OFFICE VISIT (OUTPATIENT)
Dept: PRIMARY CARE | Facility: CLINIC | Age: 63
End: 2023-10-16
Payer: COMMERCIAL

## 2023-10-16 ENCOUNTER — PATIENT OUTREACH (OUTPATIENT)
Dept: PRIMARY CARE | Facility: CLINIC | Age: 63
End: 2023-10-16

## 2023-10-16 VITALS
DIASTOLIC BLOOD PRESSURE: 73 MMHG | WEIGHT: 300 LBS | SYSTOLIC BLOOD PRESSURE: 120 MMHG | HEART RATE: 84 BPM | BODY MASS INDEX: 39.76 KG/M2 | HEIGHT: 73 IN

## 2023-10-16 DIAGNOSIS — Z23 NEED FOR INFLUENZA VACCINATION: ICD-10-CM

## 2023-10-16 DIAGNOSIS — E78.5 DYSLIPIDEMIA: ICD-10-CM

## 2023-10-16 DIAGNOSIS — F41.8 DEPRESSION WITH ANXIETY: ICD-10-CM

## 2023-10-16 DIAGNOSIS — I82.431 ACUTE DEEP VEIN THROMBOSIS (DVT) OF POPLITEAL VEIN OF RIGHT LOWER EXTREMITY (MULTI): ICD-10-CM

## 2023-10-16 DIAGNOSIS — M62.838 MUSCLE SPASM: ICD-10-CM

## 2023-10-16 DIAGNOSIS — D72.9 WHITE BLOOD CELL DISORDER: Primary | ICD-10-CM

## 2023-10-16 DIAGNOSIS — G62.9 NEUROPATHY: ICD-10-CM

## 2023-10-16 DIAGNOSIS — I10 BENIGN ESSENTIAL HYPERTENSION: ICD-10-CM

## 2023-10-16 DIAGNOSIS — D68.59 HYPERCOAGULABLE STATE (MULTI): ICD-10-CM

## 2023-10-16 DIAGNOSIS — D75.89 RED BLOOD CELL DISORDER: ICD-10-CM

## 2023-10-16 PROCEDURE — 1036F TOBACCO NON-USER: CPT | Performed by: FAMILY MEDICINE

## 2023-10-16 PROCEDURE — 99495 TRANSJ CARE MGMT MOD F2F 14D: CPT | Performed by: FAMILY MEDICINE

## 2023-10-16 PROCEDURE — 3074F SYST BP LT 130 MM HG: CPT | Performed by: FAMILY MEDICINE

## 2023-10-16 PROCEDURE — 90686 IIV4 VACC NO PRSV 0.5 ML IM: CPT | Performed by: FAMILY MEDICINE

## 2023-10-16 PROCEDURE — 90471 IMMUNIZATION ADMIN: CPT | Performed by: FAMILY MEDICINE

## 2023-10-16 PROCEDURE — 3078F DIAST BP <80 MM HG: CPT | Performed by: FAMILY MEDICINE

## 2023-10-16 RX ORDER — ROSUVASTATIN CALCIUM 10 MG/1
10 TABLET, COATED ORAL DAILY
Qty: 90 TABLET | Refills: 1 | Status: SHIPPED | OUTPATIENT
Start: 2023-10-16 | End: 2024-03-11

## 2023-10-16 RX ORDER — TESTOSTERONE CYPIONATE 200 MG/ML
INJECTION, SOLUTION INTRAMUSCULAR
COMMUNITY
End: 2023-11-13

## 2023-10-16 RX ORDER — QUETIAPINE FUMARATE 50 MG/1
50 TABLET, FILM COATED ORAL NIGHTLY
Qty: 90 TABLET | Refills: 1 | Status: SHIPPED | OUTPATIENT
Start: 2023-10-16 | End: 2024-04-02

## 2023-10-16 RX ORDER — BUPROPION HYDROCHLORIDE 100 MG/1
100 TABLET ORAL 3 TIMES DAILY
Qty: 270 TABLET | Refills: 1 | Status: SHIPPED | OUTPATIENT
Start: 2023-10-16 | End: 2024-03-11

## 2023-10-16 RX ORDER — GABAPENTIN 600 MG/1
600 TABLET ORAL 3 TIMES DAILY
Qty: 270 TABLET | Refills: 1 | Status: SHIPPED | OUTPATIENT
Start: 2023-10-16 | End: 2024-03-11

## 2023-10-16 RX ORDER — IRBESARTAN AND HYDROCHLOROTHIAZIDE 150; 12.5 MG/1; MG/1
1 TABLET, FILM COATED ORAL DAILY
Qty: 90 TABLET | Refills: 1 | Status: SHIPPED | OUTPATIENT
Start: 2023-10-16 | End: 2024-04-02

## 2023-10-16 ASSESSMENT — ENCOUNTER SYMPTOMS
GASTROINTESTINAL NEGATIVE: 1
CARDIOVASCULAR NEGATIVE: 1
CONSTITUTIONAL NEGATIVE: 1
RESPIRATORY NEGATIVE: 1
MUSCULOSKELETAL NEGATIVE: 1
NEUROLOGICAL NEGATIVE: 1

## 2023-10-16 NOTE — PROGRESS NOTES
"Patient: Cj Vargas  : 1960  PCP: Damion Ruiz MD  MRN: [id number]  Program: No linked episodes     Cj Vargas is a 62 y.o. male presenting today for follow-up after being discharged from the hospital 4 days ago. The main problem requiring admission was covid, dvt. The discharge summary and/or Transitional Care Management documentation was reviewed. Medication reconciliation was performed as indicated via the \"Joseph as Reviewed\" timestamp.     Cj Vargas was contacted by Transitional Care Management services two days after his discharge. This encounter and supporting documentation was reviewed.    The complexity of medical decision making for this patient's transitional care is moderate.    Physical Exam  Vitals reviewed.   Constitutional:       Appearance: Normal appearance. He is normal weight.   Eyes:      Extraocular Movements: Extraocular movements intact.      Conjunctiva/sclera: Conjunctivae normal.      Pupils: Pupils are equal, round, and reactive to light.   Cardiovascular:      Rate and Rhythm: Normal rate and regular rhythm.      Pulses: Normal pulses.      Heart sounds: Normal heart sounds.   Pulmonary:      Effort: Pulmonary effort is normal.      Breath sounds: Normal breath sounds.   Abdominal:      General: Bowel sounds are normal.      Palpations: Abdomen is soft.   Musculoskeletal:         General: Normal range of motion.   Skin:     General: Skin is warm and dry.   Neurological:      General: No focal deficit present.      Mental Status: He is alert and oriented to person, place, and time. Mental status is at baseline.         Assessment/Plan   Problem List Items Addressed This Visit             ICD-10-CM    Benign essential hypertension I10     Other Visit Diagnoses         Codes    White blood cell disorder    -  Primary D72.9    Depression with anxiety     F41.8    Neuropathy     G62.9    Muscle spasm     M62.838    Red blood cell disorder     D75.89            Review of " Systems   Constitutional: Negative.    HENT: Negative.     Respiratory: Negative.          Fu cp and sob sp dvt w covid, symptoms resolved   Cardiovascular: Negative.    Gastrointestinal: Negative.    Musculoskeletal: Negative.    Neurological: Negative.        No family history on file.    No data recorded    No follow-ups on file.

## 2023-10-16 NOTE — PROGRESS NOTES
Discharge Facility:[facility]  Discharge Diagnosis:Acute Pulmonary embolism  Admission Date:10/12/23  Discharge Date: 10/14/23    PCP Appointment Date:10/16/23  Specialist Appointment Date:   Hospital Encounter and Summary: Linked   See discharge assessment below for further details

## 2023-10-16 NOTE — PROGRESS NOTES
Pt comes in for fu from [facility] and ER. Pt was having chest pains and was told to go to the ER. Pt did go to [facility] and was dx with blood clots in his legs and chest. Pt eliquis dose was increased to 10 mg bid for the next 13 days and then go back to his 1 bid afterwards. Pt wanted to go back on the meloxicam.

## 2023-10-19 ENCOUNTER — PREP FOR PROCEDURE (OUTPATIENT)
Dept: ORTHOPEDIC SURGERY | Facility: CLINIC | Age: 63
End: 2023-10-19

## 2023-10-19 ENCOUNTER — OFFICE VISIT (OUTPATIENT)
Dept: HEMATOLOGY/ONCOLOGY | Facility: CLINIC | Age: 63
End: 2023-10-19
Payer: COMMERCIAL

## 2023-10-19 VITALS
HEART RATE: 97 BPM | RESPIRATION RATE: 18 BRPM | HEIGHT: 72 IN | DIASTOLIC BLOOD PRESSURE: 79 MMHG | WEIGHT: 303.79 LBS | TEMPERATURE: 96.8 F | SYSTOLIC BLOOD PRESSURE: 122 MMHG | BODY MASS INDEX: 41.15 KG/M2 | OXYGEN SATURATION: 92 %

## 2023-10-19 DIAGNOSIS — I82.4Z9 ACUTE DEEP VEIN THROMBOSIS (DVT) OF DISTAL VEIN OF LOWER EXTREMITY, UNSPECIFIED LATERALITY (MULTI): Primary | ICD-10-CM

## 2023-10-19 DIAGNOSIS — G89.29 CHRONIC PAIN OF RIGHT KNEE: ICD-10-CM

## 2023-10-19 DIAGNOSIS — D75.89 RED BLOOD CELL DISORDER: ICD-10-CM

## 2023-10-19 DIAGNOSIS — M25.561 CHRONIC PAIN OF RIGHT KNEE: ICD-10-CM

## 2023-10-19 DIAGNOSIS — D72.9 WHITE BLOOD CELL DISORDER: ICD-10-CM

## 2023-10-19 DIAGNOSIS — M17.10 ARTHRITIS OF KNEE: Primary | ICD-10-CM

## 2023-10-19 PROCEDURE — 3078F DIAST BP <80 MM HG: CPT | Performed by: INTERNAL MEDICINE

## 2023-10-19 PROCEDURE — 99213 OFFICE O/P EST LOW 20 MIN: CPT | Performed by: INTERNAL MEDICINE

## 2023-10-19 PROCEDURE — 99203 OFFICE O/P NEW LOW 30 MIN: CPT | Performed by: INTERNAL MEDICINE

## 2023-10-19 PROCEDURE — 1036F TOBACCO NON-USER: CPT | Performed by: INTERNAL MEDICINE

## 2023-10-19 PROCEDURE — 3074F SYST BP LT 130 MM HG: CPT | Performed by: INTERNAL MEDICINE

## 2023-10-19 RX ORDER — FONDAPARINUX SODIUM 2.5 MG/.5ML
2.5 INJECTION SUBCUTANEOUS DAILY
Qty: 3.5 ML | Refills: 0 | Status: SHIPPED | OUTPATIENT
Start: 2023-11-25 | End: 2023-12-02

## 2023-10-19 RX ORDER — SODIUM CHLORIDE, SODIUM LACTATE, POTASSIUM CHLORIDE, CALCIUM CHLORIDE 600; 310; 30; 20 MG/100ML; MG/100ML; MG/100ML; MG/100ML
100 INJECTION, SOLUTION INTRAVENOUS CONTINUOUS
Status: CANCELLED | OUTPATIENT
Start: 2023-10-19

## 2023-10-19 ASSESSMENT — PATIENT HEALTH QUESTIONNAIRE - PHQ9
1. LITTLE INTEREST OR PLEASURE IN DOING THINGS: NOT AT ALL
2. FEELING DOWN, DEPRESSED OR HOPELESS: SEVERAL DAYS
SUM OF ALL RESPONSES TO PHQ9 QUESTIONS 1 AND 2: 1
10. IF YOU CHECKED OFF ANY PROBLEMS, HOW DIFFICULT HAVE THESE PROBLEMS MADE IT FOR YOU TO DO YOUR WORK, TAKE CARE OF THINGS AT HOME, OR GET ALONG WITH OTHER PEOPLE: NOT DIFFICULT AT ALL

## 2023-10-19 ASSESSMENT — COLUMBIA-SUICIDE SEVERITY RATING SCALE - C-SSRS
2. HAVE YOU ACTUALLY HAD ANY THOUGHTS OF KILLING YOURSELF?: NO
6. HAVE YOU EVER DONE ANYTHING, STARTED TO DO ANYTHING, OR PREPARED TO DO ANYTHING TO END YOUR LIFE?: NO
1. IN THE PAST MONTH, HAVE YOU WISHED YOU WERE DEAD OR WISHED YOU COULD GO TO SLEEP AND NOT WAKE UP?: NO

## 2023-10-19 ASSESSMENT — ENCOUNTER SYMPTOMS
DEPRESSION: 0
LOSS OF SENSATION IN FEET: 0
OCCASIONAL FEELINGS OF UNSTEADINESS: 0

## 2023-10-19 ASSESSMENT — PAIN SCALES - GENERAL: PAINLEVEL: 3

## 2023-10-19 NOTE — PROGRESS NOTES
"Patient ID: Cj Vargas is a 63 y.o. male.  Referring Physician: No referring provider defined for this encounter.  Primary Care Provider: Damion Ruiz MD      Subjective    Preop evaluation and clearance for right knee replacement surgery    HPI  Cj is a 63 years old white man who came to this clinic for preop clearance for his right knee replacement surgery on November 28.  He was recently discharged from the hospital because of severe COVID and back infections [place] complicated by DVT and pulm embolism.    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  This is the third episode that he has and he has been on lifelong Eliquis.    Past medical history  #1 hypertension  #2 hypercholesterolemia  #3 osteoarthritis    Past surgical history  #1 left total knee replacement 2014  #2 arthroscopy right ankle  #3 arthroscopy right knee  #4 L3/L4 spine fusion January 2021        Review of Systems   All other systems reviewed and are negative.       Objective   BSA: 2.65 meters squared  /79 (BP Location: Right arm, Patient Position: Sitting, BP Cuff Size: Large adult long)   Pulse 97   Temp 36 °C (96.8 °F) (Temporal)   Resp 18   Ht 1.826 m (5' 11.89\")   Wt 138 kg (303 lb 12.7 oz)   SpO2 92%   BMI 41.33 kg/m²     No family history on file.  Oncology History    No history exists.       Social history  Cj Vargas  reports that he has never smoked. He has never been exposed to tobacco smoke. He has never used smokeless tobacco.  He  reports that he does not currently use alcohol.  He  reports no history of drug use.    Physical Exam  Vitals reviewed.   Constitutional:       Appearance: Normal appearance.   HENT:      Head: Normocephalic and atraumatic.      Nose: Nose normal.   Eyes:      Extraocular Movements: Extraocular movements intact.      Pupils: Pupils are equal, round, and reactive to light.   Cardiovascular:      Rate and Rhythm: Normal rate and regular rhythm.   Pulmonary:      " Effort: Pulmonary effort is normal.      Breath sounds: Normal breath sounds.   Abdominal:      General: Bowel sounds are normal.      Palpations: Abdomen is soft.   Genitourinary:     Comments: Deferred  Musculoskeletal:         General: Normal range of motion.   Skin:     General: Skin is warm and dry.   Neurological:      General: No focal deficit present.      Mental Status: He is alert and oriented to person, place, and time.   Psychiatric:         Mood and Affect: Mood normal.         Performance Status:  Symptomatic; fully ambulatory    Assessment/Plan      Right total knee replacement on October 28    Jona is cleared for his right knee surgery.  I asked him to stop the Eliquis 3 days prior to the surgery.  We will bridge him off the surgery we have Arixtra 10 mg subcu daily.  We will give him a total dose of 6 doses for now.                 Tejal Wiseman MD

## 2023-10-19 NOTE — PROGRESS NOTES
Patient here for new patient visit with Dr. Wiseman accompanied by his wife.   Medications and allergies reviewed.  Patient stated he had covid, was short of breathe and had chest pain- went to [facility] ER on 10/12 and was found to have PE and DVT. Denies leg swelling and said his leg was not hot.   Patient to is having right knee surgery on 11/28/2023 at [facility]- patient is here for surgical clearance.

## 2023-10-19 NOTE — H&P
History Of Present Illness  Cj Vargas is a 63 y.o. male presenting with knee pain.     Past Medical History  He has a past medical history of Unspecified cataract.    Surgical History  He has a past surgical history that includes Other surgical history (01/17/2019); Other surgical history (01/17/2019); Other surgical history (12/04/2020); Other surgical history (12/14/2022); and Other surgical history (02/13/2020).     Social History  He reports that he has never smoked. He has never been exposed to tobacco smoke. He has never used smokeless tobacco. He reports that he does not currently use alcohol. He reports that he does not use drugs.    Family History  No family history on file.     Allergies  Patient has no known allergies.    Review of Systems     Physical Exam     Last Recorded Vitals  There were no vitals taken for this visit.    Relevant Results      Scheduled medications    Continuous medications    PRN medications    No results found for this or any previous visit (from the past 24 hour(s)).    Assessment/Plan   Knee OA    TKA           Tony Donovan MD

## 2023-10-25 DIAGNOSIS — N52.9 ERECTILE DYSFUNCTION, UNSPECIFIED ERECTILE DYSFUNCTION TYPE: ICD-10-CM

## 2023-10-25 DIAGNOSIS — M62.838 MUSCLE SPASM: ICD-10-CM

## 2023-10-26 RX ORDER — TADALAFIL 20 MG/1
20 TABLET ORAL DAILY PRN
Qty: 90 TABLET | Refills: 1 | Status: SHIPPED | OUTPATIENT
Start: 2023-10-26 | End: 2024-04-02

## 2023-10-26 RX ORDER — TIZANIDINE 4 MG/1
4 TABLET ORAL 3 TIMES DAILY PRN
Qty: 90 TABLET | Refills: 1 | Status: SHIPPED | OUTPATIENT
Start: 2023-10-26 | End: 2023-12-20

## 2023-11-03 ENCOUNTER — PATIENT OUTREACH (OUTPATIENT)
Dept: PRIMARY CARE | Facility: CLINIC | Age: 63
End: 2023-11-03
Payer: COMMERCIAL

## 2023-11-13 ENCOUNTER — TELEMEDICINE CLINICAL SUPPORT (OUTPATIENT)
Dept: PREADMISSION TESTING | Facility: HOSPITAL | Age: 63
End: 2023-11-13
Payer: COMMERCIAL

## 2023-11-13 DIAGNOSIS — I10 BENIGN ESSENTIAL HYPERTENSION: ICD-10-CM

## 2023-11-13 RX ORDER — BISMUTH SUBSALICYLATE 262 MG
1 TABLET,CHEWABLE ORAL DAILY
COMMUNITY

## 2023-11-13 RX ORDER — IRBESARTAN AND HYDROCHLOROTHIAZIDE 150; 12.5 MG/1; MG/1
2 TABLET, FILM COATED ORAL DAILY
Qty: 180 TABLET | Refills: 1 | Status: CANCELLED | OUTPATIENT
Start: 2023-11-13

## 2023-11-14 ENCOUNTER — LAB (OUTPATIENT)
Dept: LAB | Facility: LAB | Age: 63
End: 2023-11-14
Payer: COMMERCIAL

## 2023-11-14 ENCOUNTER — PRE-ADMISSION TESTING (OUTPATIENT)
Dept: PREADMISSION TESTING | Facility: HOSPITAL | Age: 63
End: 2023-11-14
Payer: COMMERCIAL

## 2023-11-14 ENCOUNTER — ANCILLARY PROCEDURE (OUTPATIENT)
Dept: RADIOLOGY | Facility: CLINIC | Age: 63
End: 2023-11-14
Payer: COMMERCIAL

## 2023-11-14 ENCOUNTER — PATIENT OUTREACH (OUTPATIENT)
Dept: PRIMARY CARE | Facility: CLINIC | Age: 63
End: 2023-11-14

## 2023-11-14 VITALS
SYSTOLIC BLOOD PRESSURE: 135 MMHG | HEIGHT: 73 IN | HEART RATE: 75 BPM | WEIGHT: 293.21 LBS | TEMPERATURE: 98.2 F | BODY MASS INDEX: 38.86 KG/M2 | OXYGEN SATURATION: 97 % | DIASTOLIC BLOOD PRESSURE: 77 MMHG | RESPIRATION RATE: 18 BRPM

## 2023-11-14 DIAGNOSIS — Z01.818 PREPROCEDURAL EXAMINATION: ICD-10-CM

## 2023-11-14 DIAGNOSIS — E11.9 TYPE 2 DIABETES MELLITUS WITHOUT COMPLICATION, UNSPECIFIED WHETHER LONG TERM INSULIN USE (MULTI): ICD-10-CM

## 2023-11-14 DIAGNOSIS — M17.10 ARTHRITIS OF KNEE: ICD-10-CM

## 2023-11-14 DIAGNOSIS — M25.561 CHRONIC PAIN OF RIGHT KNEE: ICD-10-CM

## 2023-11-14 DIAGNOSIS — G89.29 CHRONIC PAIN OF RIGHT KNEE: ICD-10-CM

## 2023-11-14 DIAGNOSIS — E11.9 TYPE 2 DIABETES MELLITUS WITHOUT COMPLICATION, UNSPECIFIED WHETHER LONG TERM INSULIN USE (MULTI): Primary | ICD-10-CM

## 2023-11-14 LAB
ABO GROUP (TYPE) IN BLOOD: NORMAL
ALBUMIN SERPL BCP-MCNC: 4.5 G/DL (ref 3.4–5)
ALP SERPL-CCNC: 59 U/L (ref 33–136)
ALT SERPL W P-5'-P-CCNC: 49 U/L (ref 10–52)
ANION GAP SERPL CALC-SCNC: 12 MMOL/L (ref 10–20)
ANTIBODY SCREEN: NORMAL
APTT PPP: 38 SECONDS (ref 27–38)
AST SERPL W P-5'-P-CCNC: 42 U/L (ref 9–39)
BILIRUB SERPL-MCNC: 0.7 MG/DL (ref 0–1.2)
BUN SERPL-MCNC: 14 MG/DL (ref 6–23)
CALCIUM SERPL-MCNC: 9.4 MG/DL (ref 8.6–10.3)
CHLORIDE SERPL-SCNC: 100 MMOL/L (ref 98–107)
CO2 SERPL-SCNC: 28 MMOL/L (ref 21–32)
CREAT SERPL-MCNC: 1.07 MG/DL (ref 0.5–1.3)
ERYTHROCYTE [DISTWIDTH] IN BLOOD BY AUTOMATED COUNT: 12.3 % (ref 11.5–14.5)
EST. AVERAGE GLUCOSE BLD GHB EST-MCNC: 126 MG/DL
GFR SERPL CREATININE-BSD FRML MDRD: 78 ML/MIN/1.73M*2
GLUCOSE SERPL-MCNC: 94 MG/DL (ref 74–99)
HBA1C MFR BLD: 6 %
HCT VFR BLD AUTO: 47.1 % (ref 41–52)
HGB BLD-MCNC: 15.8 G/DL (ref 13.5–17.5)
INR PPP: 1.3 (ref 0.9–1.1)
MCH RBC QN AUTO: 32.7 PG (ref 26–34)
MCHC RBC AUTO-ENTMCNC: 33.5 G/DL (ref 32–36)
MCV RBC AUTO: 98 FL (ref 80–100)
NRBC BLD-RTO: 0 /100 WBCS (ref 0–0)
PLATELET # BLD AUTO: 237 X10*3/UL (ref 150–450)
POTASSIUM SERPL-SCNC: 4.1 MMOL/L (ref 3.5–5.3)
PROT SERPL-MCNC: 7 G/DL (ref 6.4–8.2)
PROTHROMBIN TIME: 14.7 SECONDS (ref 9.8–12.8)
RBC # BLD AUTO: 4.83 X10*6/UL (ref 4.5–5.9)
RH FACTOR (ANTIGEN D): NORMAL
SODIUM SERPL-SCNC: 136 MMOL/L (ref 136–145)
WBC # BLD AUTO: 5.6 X10*3/UL (ref 4.4–11.3)

## 2023-11-14 PROCEDURE — 80053 COMPREHEN METABOLIC PANEL: CPT

## 2023-11-14 PROCEDURE — 99214 OFFICE O/P EST MOD 30 MIN: CPT | Performed by: NURSE PRACTITIONER

## 2023-11-14 PROCEDURE — 85610 PROTHROMBIN TIME: CPT

## 2023-11-14 PROCEDURE — 73562 X-RAY EXAM OF KNEE 3: CPT | Mod: RT,FY

## 2023-11-14 PROCEDURE — 73562 X-RAY EXAM OF KNEE 3: CPT | Mod: RIGHT SIDE | Performed by: RADIOLOGY

## 2023-11-14 PROCEDURE — 85027 COMPLETE CBC AUTOMATED: CPT

## 2023-11-14 PROCEDURE — 77073 BONE LENGTH STUDIES: CPT

## 2023-11-14 PROCEDURE — 83036 HEMOGLOBIN GLYCOSYLATED A1C: CPT

## 2023-11-14 PROCEDURE — 86901 BLOOD TYPING SEROLOGIC RH(D): CPT

## 2023-11-14 PROCEDURE — 86900 BLOOD TYPING SEROLOGIC ABO: CPT

## 2023-11-14 PROCEDURE — 86850 RBC ANTIBODY SCREEN: CPT

## 2023-11-14 PROCEDURE — 77073 BONE LENGTH STUDIES: CPT | Mod: RIGHT SIDE | Performed by: RADIOLOGY

## 2023-11-14 PROCEDURE — 85730 THROMBOPLASTIN TIME PARTIAL: CPT

## 2023-11-14 PROCEDURE — 87081 CULTURE SCREEN ONLY: CPT | Mod: AHULAB | Performed by: NURSE PRACTITIONER

## 2023-11-14 PROCEDURE — 36415 COLL VENOUS BLD VENIPUNCTURE: CPT

## 2023-11-14 RX ORDER — OMEPRAZOLE 40 MG/1
40 CAPSULE, DELAYED RELEASE ORAL DAILY
COMMUNITY

## 2023-11-14 RX ORDER — CHLORHEXIDINE GLUCONATE ORAL RINSE 1.2 MG/ML
15 SOLUTION DENTAL DAILY
Qty: 30 ML | Refills: 0 | Status: SHIPPED | OUTPATIENT
Start: 2023-11-14 | End: 2023-11-16

## 2023-11-14 ASSESSMENT — ENCOUNTER SYMPTOMS
RESPIRATORY NEGATIVE: 1
CONSTITUTIONAL NEGATIVE: 1
NECK NEGATIVE: 1
ARTHRALGIAS: 1
CARDIOVASCULAR NEGATIVE: 1
GASTROINTESTINAL NEGATIVE: 1
NEUROLOGICAL NEGATIVE: 1

## 2023-11-14 NOTE — H&P (VIEW-ONLY)
CPM/PAT Evaluation       Name: Cj Vargas (Cj Vargas)  /Age: 1960/63 y.o.     In-Person       SURGEON :DR PACHECO SCHWARTZ     Surgery, Date, and Length:  Right Knee Total Replacement , 23    HPI:  This a 63 y.o. male who presents for presurgical evaluation for  .for above mentioned procedure   Pt states a long history of knee pain. Unresponsive to PT, injections and OTC pain medications . After discussion of the risks and benefits with Dr. SCHWARTZ the patient elects to proceed with the planned procedure.       Past Medical History:   Diagnosis Date    Anemia     BPH (benign prostatic hyperplasia)     Depression     DVT (deep venous thrombosis) (CMS/[facility])     GERD (gastroesophageal reflux disease)     Hyperlipidemia     Hypertension     Neuropathy     OA (osteoarthritis)     Obese     RADHA on CPAP     Panic disorder     Preoperative clearance 10/19/2023    Cleared by Dr. Juan Antonio Harris 3 days prior to surgery (instructions in office note)    Pulmonary embolism on long-term anticoagulation therapy (CMS/[facility])     Eliquis    Unspecified cataract     Cataracts, both eyes       Past Surgical History:   Procedure Laterality Date    ANKLE Right 2021    arthroscopy    ANKLE Right     athroscopy    APPENDECTOMY  1970    GASTRIC BYPASS  2017    sleeve gastrectomy    KNEE Right 2018    arthroscopy    KNEE ARTHROPLASTY Left 2013    TONSILLECTOMY  1974       Anesthesia History  Pt denies any past history of anesthetic complications such as PONV, awareness, prolonged sedation, dental damage, aspiration, cardiac arrest, difficult intubation, difficult I.V. access or unexpected hospital admissions.  NO malignant hyperthermia and or pseudo cholinesterase deficiency.    The patient is not  a [facility] and will accept blood and blood products if medically indicated.   No history of blood transfusions .Type and screen sent.     Social History  Social History     Substance and Sexual Activity    Drug Use Not Currently    Types: Marijuana    Comment: in college      Social History     Substance and Sexual Activity   Alcohol Use Not Currently      Social History     Tobacco Use   Smoking Status Never    Passive exposure: Never   Smokeless Tobacco Never        Family History   Problem Relation Name Age of Onset    Emphysema Mother      COPD Mother      Lupus Mother      Coronary artery disease Father      Hyperlipidemia Father      Hypertension Father      Stroke Father      Atrial fibrillation Brother         No Known Allergies    Prior to Admission medications    Medication Sig Start Date End Date Taking? Authorizing Provider   apixaban (Eliquis) 5 mg tablet Take 1 tablet (5 mg) by mouth 2 times a day. Do not start before October 21, 2023. 10/21/23 4/18/24  Damion Ruiz MD   buPROPion (Wellbutrin) 100 mg tablet Take 1 tablet (100 mg) by mouth 3 times a day. 10/16/23 4/13/24  Damion Ruiz MD   chlorhexidine (Peridex) 0.12 % solution Use 15 mL in the mouth or throat once daily for 2 days. 11/14/23 11/16/23  Veronika Hall APRN-ASHLEY   cholecalciferol, vitamin D3, 250 mcg (10,000 unit) tablet Take 1 tablet (250 mcg) by mouth every other day.    Historical Provider, MD   fondaparinux (Arixtra) 2.5 mg/0.5 mL syringe Inject 0.5 mL (2.5 mg) under the skin once daily for 7 days. Do not start before November 25, 2023. 11/25/23 12/2/23  Tejal Wiseman MD   gabapentin (Neurontin) 600 mg tablet Take 1 tablet (600 mg) by mouth 3 times a day. 10/16/23 4/13/24  Damion Ruiz MD   irbesartan-hydrochlorothiazide (Avalide) 150-12.5 mg tablet Take 1 tablet by mouth once daily.  Patient taking differently: Take 2 tablets by mouth once daily. 10/16/23   Damion Ruiz MD   multivitamin tablet Take 1 tablet by mouth once daily.    Historical Provider, MD   QUEtiapine (SEROquel) 50 mg tablet Take 1 tablet (50 mg) by mouth once daily at bedtime. 10/16/23   Damion Ruiz MD   rosuvastatin (Crestor) 10 mg tablet Take 1  "tablet (10 mg) by mouth once daily. 10/16/23   Damion Ruiz MD   tadalafil 20 mg tablet Take 1 tablet (20 mg) by mouth once daily as needed for erectile dysfunction (sexual activity). 10/26/23   Damion Ruiz MD   tiZANidine (Zanaflex) 4 mg tablet Take 1 tablet (4 mg) by mouth 3 times a day as needed for muscle spasms. 10/26/23   Damion Ruiz MD   vitamins H7-G7-E8-B12-protease (B-Complex With B-12) 2.5 mg-2.5 mg- 5 mg-100 mcg tablet TAKE 1 TABLET DAILY.    Historical Provider, MD   testosterone cypionate (Depo-Testosterone) 200 mg/mL injection Inject 100 mg intramuscularly once each week.  11/13/23  Historical Provider, MD SILVA ROS:   Constitutional:   neg    Neuro/Psych:   neg    Eyes:   Ears:   Nose:   neg    Mouth:   neg    Throat:   neg    Neck:   neg    Cardio:   neg    Respiratory:   neg    Endocrine:   GI:   neg    :   neg    Musculoskeletal:    arthralgias  Hematologic:   neg    Skin:  neg        Physical Exam  Vitals reviewed.   Constitutional:       Appearance: Normal appearance.   HENT:      Head: Normocephalic and atraumatic.      Mouth/Throat:      Mouth: Mucous membranes are moist.   Eyes:      Extraocular Movements: Extraocular movements intact.      Pupils: Pupils are equal, round, and reactive to light.   Cardiovascular:      Rate and Rhythm: Normal rate and regular rhythm.   Pulmonary:      Effort: Pulmonary effort is normal.      Breath sounds: Normal breath sounds.   Musculoskeletal:         General: Normal range of motion.      Cervical back: Normal range of motion.   Skin:     General: Skin is warm and dry.   Neurological:      Mental Status: He is alert and oriented to person, place, and time.   Psychiatric:         Mood and Affect: Mood normal.         Behavior: Behavior normal.          PAT AIRWAY:   Airway:     Mallampati::  II   implants    /77   Pulse 75   Temp 36.8 °C (98.2 °F)   Resp 18   Ht 1.854 m (6' 1\")   Wt 133 kg (293 lb 3.4 oz)   SpO2 97%   BMI 38.68 " kg/m²             Transthoracic Echo (TTE) Complete    Result Date: 10/13/2023   [facility], [address] [place] [address]              Tel [phone] and Fax [phone] TRANSTHORACIC ECHOCARDIOGRAM REPORT  Patient Name:      MASHA FREITAS       Reading Physician:    [id number] Eulogio Coronel DO Study Date:        10/13/2023           Ordering Provider:    [id number] GALINDO VITALE MRN/PID:           [id number]             Fellow: Accession#:        [id number]         Nurse: Date of Birth/Age: 1960 / 62      Sonographer:          Pinky nash                                      RDANDRE Gender:            M                    Additional Staff: Height:            185.00 cm            Admit Date:           10/12/2023 Weight:            132.00 kg            Admission Status:     Inpatient -                                                               Routine BSA:               2.52 m2              Encounter#:           [id number]                                         Department Location:  [facility] Telemetry Blood Pressure: 109 /72 mmHg Study Type:    TRANSTHORACIC ECHO (TTE) COMPLETE Diagnosis/ICD: Other pulmonary embolism without acute cor pulmonale-I26.99 Indication:    PE Patient History: Pertinent History: PE. Cor Pulmonale, Abnormal EKG. Study Detail: The following Echo studies were performed: 2D, M-Mode, Doppler and               color flow. Technically challenging study due to body habitus and               poor acoustic windows. Definity used as a contrast agent for               endocardial border definition. Total contrast used for this               procedure was 4 mL via IV push.  PHYSICIAN INTERPRETATION: Left Ventricle: The left ventricular systolic function is normal. The left ventricular cavity size was not assessed. Left ventricular  diastolic filling was indeterminate. Left Atrium: The left atrium was not well visualized. Right Ventricle: The right ventricle was not well visualized. Unable to determine right ventricular systolic function. Right Atrium: The right atrium is normal in size. Aortic Valve: The aortic valve was not well visualized. There is no evidence of aortic valve regurgitation. The peak instantaneous gradient of the aortic valve is 5.9 mmHg. The mean gradient of the aortic valve is 3.0 mmHg. Mitral Valve: The mitral valve is normal in structure. There is no evidence of mitral valve regurgitation. Tricuspid Valve: The tricuspid valve is structurally normal. No evidence of tricuspid regurgitation. Pulmonic Valve: The pulmonic valve is structurally normal. There is no indication of pulmonic valve regurgitation. Pericardium: There is no pericardial effusion noted. Aorta: The aortic root was not well visualized. Systemic Veins: The inferior vena cava size appears small.  CONCLUSIONS:  1. Left ventricular systolic function is normal.  2. Poorly visualized anatomical structures due to suboptimal image quality. QUANTITATIVE DATA SUMMARY: 2D MEASUREMENTS:                          Normal Ranges: LAs:           3.20 cm   (2.7-4.0cm) IVSd:          1.00 cm   (0.6-1.1cm) LVPWd:         0.90 cm   (0.6-1.1cm) LVIDd:         5.30 cm   (3.9-5.9cm) LVIDs:         3.40 cm LV Mass Index: 74.3 g/m2 LV % FS        35.8 % LA VOLUME:                               Normal Ranges: LA Vol A4C:        53.5 ml    (22+/-6mL/m2) LA Vol A2C:        47.1 ml LA Vol BP:         50.6 ml LA Vol Index A4C:  21.2ml/m2 LA Vol Index A2C:  18.7 ml/m2 LA Vol Index BP:   20.1 ml/m2 LA Area A4C:       18.7 cm2 LA Area A2C:       17.7 cm2 LA Major Axis A4C: 5.6 cm LA Major Axis A2C: 5.6 cm LA Volume Index:   20.1 ml/m2 RA VOLUME BY A/L METHOD:                       Normal Ranges: RA Area A4C: 15.1 cm2 AORTA MEASUREMENTS:                      Normal Ranges: Ao Sinus, d: 3.53  cm (2.1-3.5cm) Ao STJ, d:   3.54 cm (1.7-3.4cm) Asc Ao, d:   3.40 cm (2.1-3.4cm) LV SYSTOLIC FUNCTION BY 2D PLANIMETRY (MOD):                     Normal Ranges: EF-A4C View: 62.8 % (>=55%) EF-A2C View: 66.7 % EF-Biplane:  66.6 % LV DIASTOLIC FUNCTION:                               Normal Ranges: MV Peak E:        0.73 m/s    (0.7-1.2 m/s) MV Peak A:        0.65 m/s    (0.42-0.7 m/s) E/A Ratio:        1.11        (1.0-2.2) MV e'             0.09 m/s    (>8.0) MV lateral e'     0.11 m/s MV medial e'      0.09 m/s MV A Dur:         129.00 msec E/e' Ratio:       8.07        (<8.0) PulmV Sys Kush:    29.30 cm/s PulmV Galdamez Kush:   20.40 cm/s PulmV S/D Kush:    1.40 PulmV A Revs Kush: 25.50 cm/s PulmV A Revs Dur: 106.00 msec MITRAL VALVE:                 Normal Ranges: MV DT: 217 msec (150-240msec) AORTIC VALVE:                                   Normal Ranges: AoV Vmax:                1.21 m/s (<=1.7m/s) AoV Peak P.9 mmHg (<20mmHg) AoV Mean PG:             3.0 mmHg (1.7-11.5mmHg) LVOT Max Kush:            0.92 m/s (<=1.1m/s) AoV VTI:                 22.40 cm (18-25cm) LVOT VTI:                14.70 cm LVOT Diameter:           2.90 cm  (1.8-2.4cm) AoV Area, VTI:           3.14 cm2 (2.5-5.5cm2) AoV Area,Vmax:           3.62 cm2 (2.5-4.5cm2) AoV Dimensionless Index: 0.66  RIGHT VENTRICLE: RV Basal 3.98 cm RV Mid   2.90 cm RV Major 8.7 cm TAPSE:   19.1 mm TRICUSPID VALVE/RVSP:                           Normal Ranges: Est. RA Pressure: 3 mmHg IVC Diam:         1.17 cm Pulmonary Veins: PulmV A Revs Dur: 106.00 msec PulmV A Revs Kush: 25.50 cm/s PulmV Galdamez Kush:   20.40 cm/s PulmV S/D Kush:    1.40 PulmV Sys Kush:    29.30 cm/s  [id number] Eulogio Coronel DO Electronically signed on 10/13/2023 at 1:45:17 PM  ** Final **         Lab Results   Component Value Date    WBC 5.6 2023    HGB 15.8 2023    HCT 47.1 2023    MCV 98 2023     2023     Results from last 7 days   Lab Units 23  1142    SODIUM mmol/L 136   POTASSIUM mmol/L 4.1   CHLORIDE mmol/L 100   CO2 mmol/L 28   BUN mg/dL 14   CREATININE mg/dL 1.07   CALCIUM mg/dL 9.4   PROTEIN TOTAL g/dL 7.0   BILIRUBIN TOTAL mg/dL 0.7   ALK PHOS U/L 59   ALT U/L 49   AST U/L 42*   GLUCOSE mg/dL 94         ASSESSMENT/PLAN    Patient is a 63year-old  scheduled for Right Knee Total Replacement , with Dr. SCHWARTZ   on  11/28/23 .  CARDIOVASCULAR:  RCRI score / Risk: The patients score is 0 based on history . Per ACC/AHA guidelines this places HIM at  3.9% risk for MACE undergoing a INTERMEDIATE  risk procedure . The patient has the following risk factors:  Functional Capacity: The patients exercise tolerance is  4  METS. This is based on the patients LIMITED ONLY DUE TO KNEE PAIN . Patient denies  active cardiac symptoms or anginal equivalents .      PULMONARY:  The patient has the following factors that place them at increased risk of perioperative pulmonary complications; RECENT PE/BMI greater than 27/RADHA/greater than 2. hour procedure.  Postoperatively the patient would benefit from early pulmonary toilet/incentive spirometry q 1-2 hours while awake/pulse oximetry/cautious use of respiratory depressant medications such as opioids/elevate the HOB/oral hygiene.    BPH  Pt is on (alpha 5- reductase inhibitors) Recommendations: continue throughout perioperative period, monitior for urinary retention, avoid larson catheter if able..      PE/ANTICOAGULATION THERAPY :  Patient was instructed to stop eliquis , 3 days before surgery . Instructions received from  hematologist Dr Tejal Wiseman.Pt has instructions for Arixta from Dr Wiseman   Resume anticoagulation therapy as soon as appropriate.    ANEMIA :  The patient has a history of anemia.  The patient’s anemia appears stable when compared to prior labs.  Type and screen obtained.      DVT:  CAPRINI SCORE=13  The patient has the following factors that increase his Risk for thrombus formation ; Virchow's triad ,age>60,  bmi>40, PE on 10/12/23, TJR , Surgical procedure >2 hrs  procedure .    Recommendations: DVT prophylaxis  per Dr. Donovan  protocol . SCD's, MYRNA's, and early ambulation are recommended. Heparin or LMWH is recommended for the very high risk .      Risk assessment complete.  Patient is scheduled for  intermediate  surgical risk procedure.  Patient is considered an ACCEPTABLE  risk to proceed with the planned procedure.      Preoperative medication instructions were provided and reviewed with the patient.  Any additional testing or evaluation was explained to the patient.  Nothing by mouth instructions were discussed and patient's questions were answered prior to conclusion to this encounter.  Patient verbalized understanding of preoperative instructions given in preadmission testing; discharge instructions available in EMR.

## 2023-11-14 NOTE — PREPROCEDURE INSTRUCTIONS
Medication List            Accurate as of November 14, 2023 10:44 AM. Always use your most recent med list.                apixaban 5 mg tablet  Commonly known as: Eliquis  Take 1 tablet (5 mg) by mouth 2 times a day. Do not start before October 21, 2023.  Medication Adjustments for Surgery: Stop 3 days before surgery     B-Complex With B-12 2.5 mg-2.5 mg- 5 mg-100 mcg tablet  Generic drug: vitamins R4-H5-L9-B12-protease  Medication Adjustments for Surgery: Stop 7 days before surgery     buPROPion 100 mg tablet  Commonly known as: Wellbutrin  Take 1 tablet (100 mg) by mouth 3 times a day.  Medication Adjustments for Surgery: Take morning of surgery with sip of water, no other fluids     chlorhexidine 0.12 % solution  Commonly known as: Peridex  Use 15 mL in the mouth or throat once daily for 2 days.  Notes to patient: Use as directed      cholecalciferol (vitamin D3) 250 mcg (10,000 unit) tablet  Medication Adjustments for Surgery: Stop 1 day before surgery     fondaparinux 2.5 mg/0.5 mL syringe  Commonly known as: Arixtra  Inject 0.5 mL (2.5 mg) under the skin once daily for 7 days. Do not start before November 25, 2023.  Start taking on: November 25, 2023  Notes to patient: Use as instructed      gabapentin 600 mg tablet  Commonly known as: Neurontin  Take 1 tablet (600 mg) by mouth 3 times a day.  Medication Adjustments for Surgery: Take morning of surgery with sip of water, no other fluids     irbesartan-hydrochlorothiazide 150-12.5 mg tablet  Commonly known as: Avalide  Take 1 tablet by mouth once daily.  Medication Adjustments for Surgery: Continue until night before surgery     multivitamin tablet  Medication Adjustments for Surgery: Stop 7 days before surgery     omeprazole 40 mg DR capsule  Commonly known as: PriLOSEC  Medication Adjustments for Surgery: Take morning of surgery with sip of water, no other fluids     QUEtiapine 50 mg tablet  Commonly known as: SEROquel  Take 1 tablet (50 mg) by mouth once  daily at bedtime.  Medication Adjustments for Surgery: Take morning of surgery with sip of water, no other fluids     rosuvastatin 10 mg tablet  Commonly known as: Crestor  Take 1 tablet (10 mg) by mouth once daily.  Medication Adjustments for Surgery: Take morning of surgery with sip of water, no other fluids     tadalafil 20 mg tablet  Commonly known as: Cialis  Take 1 tablet (20 mg) by mouth once daily as needed for erectile dysfunction (sexual activity).  Medication Adjustments for Surgery: Stop 2 days before surgery     tiZANidine 4 mg tablet  Commonly known as: Zanaflex  Take 1 tablet (4 mg) by mouth 3 times a day as needed for muscle spasms.  Medication Adjustments for Surgery: Continue until night before surgery                      CONTACT SURGEON'S OFFICE IF YOU DEVELOP:  * Fever = 100.4 F   * New respiratory symptoms (e.g. cough, shortness of breath, respiratory distress, sore throat)  * Recent loss of taste or smell  *Flu like symptoms such as headache, fatigue or gastrointestinal symptoms  * You develop any open sores, shingles, burning or painful urination   AND/OR:  * You no longer wish to have the surgery.  * Any other personal circumstances change that may lead to the need to cancel or defer this surgery.  *You were admitted to any hospital within one week of your planned procedure.    SMOKING:  *Quitting smoking can make a huge difference to your health and recovery from surgery.    *If you need help with quitting, call 9-800QUIT-NOW.    THE DAY BEFORE SURGERY:  *Do not eat any food after midnight the night before surgery.   *You are permitted to drink clear liquids (i.e. water, black coffee, tea, clear broth, apple juice) up to 2 hours before your surgery.  DIABETICS:  Please check fasting blood sugar  upon waking up.  If fasting sugar is <80 mg/dl, please drink 100ml/3oz of apple juice no later than 2 hours prior to surgery.      SURGICAL TIME  *You will be contacted between 2 p.m. and 6 p.m.  the business day before your surgery with your arrival time.  *If you haven't received a call by 6pm, call [phone].  *Scheduled surgery times may change and you will be notified if this occurs-check your personal voicemail for any updates.    ON THE MORNING OF SURGERY:  *Wear comfortable, loose fitting clothing.   *Do not use moisturizers, creams, lotions or perfume.  *All jewelry and valuables should be left at home.  *Prosthetic devices such as contact lenses, hearing aids, dentures, eyelash extensions, hairpins and body piercing must be removed before surgery.    BRING WITH YOU:  *Photo ID and insurance card  *Current list of medicines and allergies  *Pacemaker/Defibrillator/Heart stent cards  *CPAP machine and mask  *Slings/splints/crutches  *Copy of your complete Advanced Directive/DHPOA-if applicable  *Neurostimulator implant remote    PARKING AND ARRIVAL:  *Check in at the Main Entrance desk and let them know you are here for surgery.  *You will be directed to the 2nd floor surgical waiting area.    AFTER OUTPATIENT SURGERY:  *A responsible adult MUST accompany you at the time of discharge and stay with you for 24 hours after your surgery.  *You may NOT drive yourself home after surgery.  *You may use a taxi or ride sharing service ([employer], [employer]) to return home ONLY if you are accompanied by a friend or family member.  *Instructions for resuming your medications will be provided by your surgeon.          NPO Instructions:    Do not eat any food after midnight the night before your surgery/procedure.  You may have clear liquids until TWO hours before surgery/procedure. This includes water, black tea/coffee, (no milk or cream) apple juice and electrolyte drinks (Gatorade).  You may chew gum up to TWO hours before your surgery/procedure.    Additional Instructions:     YOU HAVE REVIEWED THE MEDICATIONS ON THIS SHEET AND YOU VERIFY THESE ARE ALL THE MEDICATIONS AND OVER THE COUNTER MEDICATIONS THAT YOU TAKE  .

## 2023-11-14 NOTE — CPM/PAT H&P
CPM/PAT Evaluation       Name: Cj Vargas (Cj Vargas)  /Age: 1960/63 y.o.     In-Person       SURGEON :DR PACHECO SCHWARTZ     Surgery, Date, and Length:  Right Knee Total Replacement , 23    HPI:  This a 63 y.o. male who presents for presurgical evaluation for  .for above mentioned procedure   Pt states a long history of knee pain. Unresponsive to PT, injections and OTC pain medications . After discussion of the risks and benefits with Dr. SCHWARTZ the patient elects to proceed with the planned procedure.       Past Medical History:   Diagnosis Date    Anemia     BPH (benign prostatic hyperplasia)     Depression     DVT (deep venous thrombosis) (CMS/[facility])     GERD (gastroesophageal reflux disease)     Hyperlipidemia     Hypertension     Neuropathy     OA (osteoarthritis)     Obese     RADHA on CPAP     Panic disorder     Preoperative clearance 10/19/2023    Cleared by Dr. Juan Antonio Harris 3 days prior to surgery (instructions in office note)    Pulmonary embolism on long-term anticoagulation therapy (CMS/[facility])     Eliquis    Unspecified cataract     Cataracts, both eyes       Past Surgical History:   Procedure Laterality Date    ANKLE Right 2021    arthroscopy    ANKLE Right     athroscopy    APPENDECTOMY  1970    GASTRIC BYPASS  2017    sleeve gastrectomy    KNEE Right 2018    arthroscopy    KNEE ARTHROPLASTY Left 2013    TONSILLECTOMY  1974       Anesthesia History  Pt denies any past history of anesthetic complications such as PONV, awareness, prolonged sedation, dental damage, aspiration, cardiac arrest, difficult intubation, difficult I.V. access or unexpected hospital admissions.  NO malignant hyperthermia and or pseudo cholinesterase deficiency.    The patient is not  a Baptism and will accept blood and blood products if medically indicated.   No history of blood transfusions .Type and screen sent.     Social History  Social History     Substance and Sexual Activity    Drug Use Not Currently    Types: Marijuana    Comment: in college      Social History     Substance and Sexual Activity   Alcohol Use Not Currently      Social History     Tobacco Use   Smoking Status Never    Passive exposure: Never   Smokeless Tobacco Never        Family History   Problem Relation Name Age of Onset    Emphysema Mother      COPD Mother      Lupus Mother      Coronary artery disease Father      Hyperlipidemia Father      Hypertension Father      Stroke Father      Atrial fibrillation Brother         No Known Allergies    Prior to Admission medications    Medication Sig Start Date End Date Taking? Authorizing Provider   apixaban (Eliquis) 5 mg tablet Take 1 tablet (5 mg) by mouth 2 times a day. Do not start before October 21, 2023. 10/21/23 4/18/24  Damion Ruiz MD   buPROPion (Wellbutrin) 100 mg tablet Take 1 tablet (100 mg) by mouth 3 times a day. 10/16/23 4/13/24  Damion Ruiz MD   chlorhexidine (Peridex) 0.12 % solution Use 15 mL in the mouth or throat once daily for 2 days. 11/14/23 11/16/23  Veronika Hall APRN-ASHLEY   cholecalciferol, vitamin D3, 250 mcg (10,000 unit) tablet Take 1 tablet (250 mcg) by mouth every other day.    Historical Provider, MD   fondaparinux (Arixtra) 2.5 mg/0.5 mL syringe Inject 0.5 mL (2.5 mg) under the skin once daily for 7 days. Do not start before November 25, 2023. 11/25/23 12/2/23  Tejal Wiseman MD   gabapentin (Neurontin) 600 mg tablet Take 1 tablet (600 mg) by mouth 3 times a day. 10/16/23 4/13/24  Damion Ruiz MD   irbesartan-hydrochlorothiazide (Avalide) 150-12.5 mg tablet Take 1 tablet by mouth once daily.  Patient taking differently: Take 2 tablets by mouth once daily. 10/16/23   Damion Ruiz MD   multivitamin tablet Take 1 tablet by mouth once daily.    Historical Provider, MD   QUEtiapine (SEROquel) 50 mg tablet Take 1 tablet (50 mg) by mouth once daily at bedtime. 10/16/23   Damion Ruiz MD   rosuvastatin (Crestor) 10 mg tablet Take 1  "tablet (10 mg) by mouth once daily. 10/16/23   Damion Ruiz MD   tadalafil 20 mg tablet Take 1 tablet (20 mg) by mouth once daily as needed for erectile dysfunction (sexual activity). 10/26/23   Damion Ruiz MD   tiZANidine (Zanaflex) 4 mg tablet Take 1 tablet (4 mg) by mouth 3 times a day as needed for muscle spasms. 10/26/23   Damion Ruiz MD   vitamins U8-X9-Q1-B12-protease (B-Complex With B-12) 2.5 mg-2.5 mg- 5 mg-100 mcg tablet TAKE 1 TABLET DAILY.    Historical Provider, MD   testosterone cypionate (Depo-Testosterone) 200 mg/mL injection Inject 100 mg intramuscularly once each week.  11/13/23  Historical Provider, MD SILVA ROS:   Constitutional:   neg    Neuro/Psych:   neg    Eyes:   Ears:   Nose:   neg    Mouth:   neg    Throat:   neg    Neck:   neg    Cardio:   neg    Respiratory:   neg    Endocrine:   GI:   neg    :   neg    Musculoskeletal:    arthralgias  Hematologic:   neg    Skin:  neg        Physical Exam  Vitals reviewed.   Constitutional:       Appearance: Normal appearance.   HENT:      Head: Normocephalic and atraumatic.      Mouth/Throat:      Mouth: Mucous membranes are moist.   Eyes:      Extraocular Movements: Extraocular movements intact.      Pupils: Pupils are equal, round, and reactive to light.   Cardiovascular:      Rate and Rhythm: Normal rate and regular rhythm.   Pulmonary:      Effort: Pulmonary effort is normal.      Breath sounds: Normal breath sounds.   Musculoskeletal:         General: Normal range of motion.      Cervical back: Normal range of motion.   Skin:     General: Skin is warm and dry.   Neurological:      Mental Status: He is alert and oriented to person, place, and time.   Psychiatric:         Mood and Affect: Mood normal.         Behavior: Behavior normal.          PAT AIRWAY:   Airway:     Mallampati::  II   implants    /77   Pulse 75   Temp 36.8 °C (98.2 °F)   Resp 18   Ht 1.854 m (6' 1\")   Wt 133 kg (293 lb 3.4 oz)   SpO2 97%   BMI 38.68 " kg/m²             Transthoracic Echo (TTE) Complete    Result Date: 10/13/2023   [facility], [address] [place] [address]              Tel [phone] and Fax [phone] TRANSTHORACIC ECHOCARDIOGRAM REPORT  Patient Name:      MASHA FREITAS       Reading Physician:    [id number] Eulogio Coronel DO Study Date:        10/13/2023           Ordering Provider:    [id number] GALINDO VITALE MRN/PID:           [id number]             Fellow: Accession#:        [id number]         Nurse: Date of Birth/Age: 1960 / 62      Sonographer:          Pinky nash                                      RDANDRE Gender:            M                    Additional Staff: Height:            185.00 cm            Admit Date:           10/12/2023 Weight:            132.00 kg            Admission Status:     Inpatient -                                                               Routine BSA:               2.52 m2              Encounter#:           [id number]                                         Department Location:  [facility] Telemetry Blood Pressure: 109 /72 mmHg Study Type:    TRANSTHORACIC ECHO (TTE) COMPLETE Diagnosis/ICD: Other pulmonary embolism without acute cor pulmonale-I26.99 Indication:    PE Patient History: Pertinent History: PE. Cor Pulmonale, Abnormal EKG. Study Detail: The following Echo studies were performed: 2D, M-Mode, Doppler and               color flow. Technically challenging study due to body habitus and               poor acoustic windows. Definity used as a contrast agent for               endocardial border definition. Total contrast used for this               procedure was 4 mL via IV push.  PHYSICIAN INTERPRETATION: Left Ventricle: The left ventricular systolic function is normal. The left ventricular cavity size was not assessed. Left ventricular  diastolic filling was indeterminate. Left Atrium: The left atrium was not well visualized. Right Ventricle: The right ventricle was not well visualized. Unable to determine right ventricular systolic function. Right Atrium: The right atrium is normal in size. Aortic Valve: The aortic valve was not well visualized. There is no evidence of aortic valve regurgitation. The peak instantaneous gradient of the aortic valve is 5.9 mmHg. The mean gradient of the aortic valve is 3.0 mmHg. Mitral Valve: The mitral valve is normal in structure. There is no evidence of mitral valve regurgitation. Tricuspid Valve: The tricuspid valve is structurally normal. No evidence of tricuspid regurgitation. Pulmonic Valve: The pulmonic valve is structurally normal. There is no indication of pulmonic valve regurgitation. Pericardium: There is no pericardial effusion noted. Aorta: The aortic root was not well visualized. Systemic Veins: The inferior vena cava size appears small.  CONCLUSIONS:  1. Left ventricular systolic function is normal.  2. Poorly visualized anatomical structures due to suboptimal image quality. QUANTITATIVE DATA SUMMARY: 2D MEASUREMENTS:                          Normal Ranges: LAs:           3.20 cm   (2.7-4.0cm) IVSd:          1.00 cm   (0.6-1.1cm) LVPWd:         0.90 cm   (0.6-1.1cm) LVIDd:         5.30 cm   (3.9-5.9cm) LVIDs:         3.40 cm LV Mass Index: 74.3 g/m2 LV % FS        35.8 % LA VOLUME:                               Normal Ranges: LA Vol A4C:        53.5 ml    (22+/-6mL/m2) LA Vol A2C:        47.1 ml LA Vol BP:         50.6 ml LA Vol Index A4C:  21.2ml/m2 LA Vol Index A2C:  18.7 ml/m2 LA Vol Index BP:   20.1 ml/m2 LA Area A4C:       18.7 cm2 LA Area A2C:       17.7 cm2 LA Major Axis A4C: 5.6 cm LA Major Axis A2C: 5.6 cm LA Volume Index:   20.1 ml/m2 RA VOLUME BY A/L METHOD:                       Normal Ranges: RA Area A4C: 15.1 cm2 AORTA MEASUREMENTS:                      Normal Ranges: Ao Sinus, d: 3.53  cm (2.1-3.5cm) Ao STJ, d:   3.54 cm (1.7-3.4cm) Asc Ao, d:   3.40 cm (2.1-3.4cm) LV SYSTOLIC FUNCTION BY 2D PLANIMETRY (MOD):                     Normal Ranges: EF-A4C View: 62.8 % (>=55%) EF-A2C View: 66.7 % EF-Biplane:  66.6 % LV DIASTOLIC FUNCTION:                               Normal Ranges: MV Peak E:        0.73 m/s    (0.7-1.2 m/s) MV Peak A:        0.65 m/s    (0.42-0.7 m/s) E/A Ratio:        1.11        (1.0-2.2) MV e'             0.09 m/s    (>8.0) MV lateral e'     0.11 m/s MV medial e'      0.09 m/s MV A Dur:         129.00 msec E/e' Ratio:       8.07        (<8.0) PulmV Sys Kush:    29.30 cm/s PulmV Galdamez Kush:   20.40 cm/s PulmV S/D Kush:    1.40 PulmV A Revs Kush: 25.50 cm/s PulmV A Revs Dur: 106.00 msec MITRAL VALVE:                 Normal Ranges: MV DT: 217 msec (150-240msec) AORTIC VALVE:                                   Normal Ranges: AoV Vmax:                1.21 m/s (<=1.7m/s) AoV Peak P.9 mmHg (<20mmHg) AoV Mean PG:             3.0 mmHg (1.7-11.5mmHg) LVOT Max Kush:            0.92 m/s (<=1.1m/s) AoV VTI:                 22.40 cm (18-25cm) LVOT VTI:                14.70 cm LVOT Diameter:           2.90 cm  (1.8-2.4cm) AoV Area, VTI:           3.14 cm2 (2.5-5.5cm2) AoV Area,Vmax:           3.62 cm2 (2.5-4.5cm2) AoV Dimensionless Index: 0.66  RIGHT VENTRICLE: RV Basal 3.98 cm RV Mid   2.90 cm RV Major 8.7 cm TAPSE:   19.1 mm TRICUSPID VALVE/RVSP:                           Normal Ranges: Est. RA Pressure: 3 mmHg IVC Diam:         1.17 cm Pulmonary Veins: PulmV A Revs Dur: 106.00 msec PulmV A Revs Kush: 25.50 cm/s PulmV Galdamez Kush:   20.40 cm/s PulmV S/D Kush:    1.40 PulmV Sys Kush:    29.30 cm/s  [id number] Eulogio Coronel DO Electronically signed on 10/13/2023 at 1:45:17 PM  ** Final **         Lab Results   Component Value Date    WBC 5.6 2023    HGB 15.8 2023    HCT 47.1 2023    MCV 98 2023     2023     Results from last 7 days   Lab Units 23  1142    SODIUM mmol/L 136   POTASSIUM mmol/L 4.1   CHLORIDE mmol/L 100   CO2 mmol/L 28   BUN mg/dL 14   CREATININE mg/dL 1.07   CALCIUM mg/dL 9.4   PROTEIN TOTAL g/dL 7.0   BILIRUBIN TOTAL mg/dL 0.7   ALK PHOS U/L 59   ALT U/L 49   AST U/L 42*   GLUCOSE mg/dL 94         ASSESSMENT/PLAN    Patient is a 63year-old  scheduled for Right Knee Total Replacement , with Dr. SCHWARTZ   on  11/28/23 .  CARDIOVASCULAR:  RCRI score / Risk: The patients score is 0 based on history . Per ACC/AHA guidelines this places HIM at  3.9% risk for MACE undergoing a INTERMEDIATE  risk procedure . The patient has the following risk factors:  Functional Capacity: The patients exercise tolerance is  4  METS. This is based on the patients LIMITED ONLY DUE TO KNEE PAIN . Patient denies  active cardiac symptoms or anginal equivalents .      PULMONARY:  The patient has the following factors that place them at increased risk of perioperative pulmonary complications; RECENT PE/BMI greater than 27/RADHA/greater than 2. hour procedure.  Postoperatively the patient would benefit from early pulmonary toilet/incentive spirometry q 1-2 hours while awake/pulse oximetry/cautious use of respiratory depressant medications such as opioids/elevate the HOB/oral hygiene.    BPH  Pt is on (alpha 5- reductase inhibitors) Recommendations: continue throughout perioperative period, monitior for urinary retention, avoid larson catheter if able..      PE/ANTICOAGULATION THERAPY :  Patient was instructed to stop eliquis , 3 days before surgery . Instructions received from  hematologist Dr Tejal Wiseman.Pt has instructions for Arixta from Dr Wiseman   Resume anticoagulation therapy as soon as appropriate.    ANEMIA :  The patient has a history of anemia.  The patient’s anemia appears stable when compared to prior labs.  Type and screen obtained.      DVT:  CAPRINI SCORE=13  The patient has the following factors that increase his Risk for thrombus formation ; Virchow's triad ,age>60,  bmi>40, PE on 10/12/23, TJR , Surgical procedure >2 hrs  procedure .    Recommendations: DVT prophylaxis  per Dr. Donovan  protocol . SCD's, MYRNA's, and early ambulation are recommended. Heparin or LMWH is recommended for the very high risk .      Risk assessment complete.  Patient is scheduled for  intermediate  surgical risk procedure.  Patient is considered an ACCEPTABLE  risk to proceed with the planned procedure.      Preoperative medication instructions were provided and reviewed with the patient.  Any additional testing or evaluation was explained to the patient.  Nothing by mouth instructions were discussed and patient's questions were answered prior to conclusion to this encounter.  Patient verbalized understanding of preoperative instructions given in preadmission testing; discharge instructions available in EMR.

## 2023-11-16 LAB — STAPHYLOCOCCUS SPEC CULT: NORMAL

## 2023-11-25 SDOH — SOCIAL STABILITY: SOCIAL INSECURITY: WITHIN THE LAST YEAR, HAVE YOU BEEN HUMILIATED OR EMOTIONALLY ABUSED IN OTHER WAYS BY YOUR PARTNER OR EX-PARTNER?: NO

## 2023-11-25 SDOH — ECONOMIC STABILITY: INCOME INSECURITY: HOW HARD IS IT FOR YOU TO PAY FOR THE VERY BASICS LIKE FOOD, HOUSING, MEDICAL CARE, AND HEATING?: NOT HARD AT ALL

## 2023-11-25 SDOH — ECONOMIC STABILITY: FOOD INSECURITY: WITHIN THE PAST 12 MONTHS, THE FOOD YOU BOUGHT JUST DIDN'T LAST AND YOU DIDN'T HAVE MONEY TO GET MORE.: NEVER TRUE

## 2023-11-25 SDOH — SOCIAL STABILITY: SOCIAL NETWORK: ARE YOU MARRIED, WIDOWED, DIVORCED, SEPARATED, NEVER MARRIED, OR LIVING WITH A PARTNER?: MARRIED

## 2023-11-25 SDOH — ECONOMIC STABILITY: FOOD INSECURITY: WITHIN THE PAST 12 MONTHS, YOU WORRIED THAT YOUR FOOD WOULD RUN OUT BEFORE YOU GOT MONEY TO BUY MORE.: NEVER TRUE

## 2023-11-25 SDOH — ECONOMIC STABILITY: INCOME INSECURITY: IN THE LAST 12 MONTHS, WAS THERE A TIME WHEN YOU WERE NOT ABLE TO PAY THE MORTGAGE OR RENT ON TIME?: NO

## 2023-11-25 SDOH — HEALTH STABILITY: MENTAL HEALTH: HOW OFTEN DO YOU HAVE A DRINK CONTAINING ALCOHOL?: NEVER

## 2023-11-25 SDOH — HEALTH STABILITY: MENTAL HEALTH: HOW OFTEN DO YOU HAVE 6 OR MORE DRINKS ON ONE OCCASION?: NEVER

## 2023-11-25 SDOH — HEALTH STABILITY: MENTAL HEALTH: HOW MANY STANDARD DRINKS CONTAINING ALCOHOL DO YOU HAVE ON A TYPICAL DAY?: PATIENT DOES NOT DRINK

## 2023-11-25 SDOH — SOCIAL STABILITY: SOCIAL INSECURITY: WITHIN THE LAST YEAR, HAVE YOU BEEN AFRAID OF YOUR PARTNER OR EX-PARTNER?: NO

## 2023-11-25 SDOH — ECONOMIC STABILITY: HOUSING INSECURITY: IN THE LAST 12 MONTHS, HOW MANY PLACES HAVE YOU LIVED?: 1

## 2023-11-25 ASSESSMENT — LIFESTYLE VARIABLES
SKIP TO QUESTIONS 9-10: 1
AUDIT-C TOTAL SCORE: 0

## 2023-11-25 ASSESSMENT — ACTIVITIES OF DAILY LIVING (ADL): LACK_OF_TRANSPORTATION: NO

## 2023-11-25 NOTE — PROGRESS NOTES
11/25/23 1700   Discharge Planning   Living Arrangements Spouse/significant other   Support Systems Spouse/significant other   Assistance Needed none   Type of Residence Private residence   Number of Stairs to Enter Residence 3   Number of Stairs Within Residence 14   Do you have animals or pets at home? Yes   Type of Animals or Pets 2 parrots   Who is requesting discharge planning? Provider   Home or Post Acute Services In home services   Type of Home Care Services Home PT   Patient expects to be discharged to: home   Does the patient need discharge transport arranged? No   Financial Resource Strain   How hard is it for you to pay for the very basics like food, housing, medical care, and heating? Not hard   Housing Stability   In the last 12 months, was there a time when you were not able to pay the mortgage or rent on time? N   In the last 12 months, how many places have you lived? 1   In the last 12 months, was there a time when you did not have a steady place to sleep or slept in a shelter (including now)? N   Transportation Needs   In the past 12 months, has lack of transportation kept you from medical appointments or from getting medications? no   In the past 12 months, has lack of transportation kept you from meetings, work, or from getting things needed for daily living? No   Patient Choice   Provider Choice list and CMS website (https://medicare.gov/care-compare#search) for post-acute Quality and Resource Measure Data were provided and reviewed with: Patient   Patient / Family choosing to utilize agency / facility established prior to hospitalization No     11/25/23 1701  Spoke with patient over phone to discuss discharge planning for after surgery.  Patient lives at home with his wife in a house.  He is independent with ADLs and drives at baseline.  He does not use any assistive devices for ambulation.  He plans on returning home at discharge and is agreeable to [facility].  Reviewed C options and  expectations with him.  He would like to use [facility].   He would also like to use meds to beds.   Teagan Luu RN TCC

## 2023-11-28 ENCOUNTER — ANESTHESIA (OUTPATIENT)
Dept: OPERATING ROOM | Facility: HOSPITAL | Age: 63
End: 2023-11-28
Payer: COMMERCIAL

## 2023-11-28 ENCOUNTER — ANESTHESIA EVENT (OUTPATIENT)
Dept: OPERATING ROOM | Facility: HOSPITAL | Age: 63
End: 2023-11-28
Payer: COMMERCIAL

## 2023-11-28 ENCOUNTER — HOME HEALTH ADMISSION (OUTPATIENT)
Dept: HOME HEALTH SERVICES | Facility: HOME HEALTH | Age: 63
End: 2023-11-28
Payer: COMMERCIAL

## 2023-11-28 ENCOUNTER — HOSPITAL ENCOUNTER (OUTPATIENT)
Facility: HOSPITAL | Age: 63
Discharge: HOME | End: 2023-11-28
Attending: ORTHOPAEDIC SURGERY | Admitting: ORTHOPAEDIC SURGERY
Payer: COMMERCIAL

## 2023-11-28 ENCOUNTER — PHARMACY VISIT (OUTPATIENT)
Dept: PHARMACY | Facility: CLINIC | Age: 63
End: 2023-11-28
Payer: MEDICARE

## 2023-11-28 ENCOUNTER — APPOINTMENT (OUTPATIENT)
Dept: RADIOLOGY | Facility: HOSPITAL | Age: 63
End: 2023-11-28
Payer: COMMERCIAL

## 2023-11-28 VITALS
OXYGEN SATURATION: 94 % | BODY MASS INDEX: 40.04 KG/M2 | SYSTOLIC BLOOD PRESSURE: 130 MMHG | HEART RATE: 75 BPM | HEIGHT: 72 IN | RESPIRATION RATE: 14 BRPM | WEIGHT: 295.64 LBS | TEMPERATURE: 97.9 F | DIASTOLIC BLOOD PRESSURE: 78 MMHG

## 2023-11-28 DIAGNOSIS — M25.561 CHRONIC PAIN OF RIGHT KNEE: ICD-10-CM

## 2023-11-28 DIAGNOSIS — G89.29 CHRONIC PAIN OF RIGHT KNEE: ICD-10-CM

## 2023-11-28 DIAGNOSIS — M17.10 ARTHRITIS OF KNEE: ICD-10-CM

## 2023-11-28 LAB
ABO GROUP (TYPE) IN BLOOD: NORMAL
RH FACTOR (ANTIGEN D): NORMAL

## 2023-11-28 PROCEDURE — 3700000001 HC GENERAL ANESTHESIA TIME - INITIAL BASE CHARGE: Performed by: ORTHOPAEDIC SURGERY

## 2023-11-28 PROCEDURE — 36415 COLL VENOUS BLD VENIPUNCTURE: CPT | Performed by: ORTHOPAEDIC SURGERY

## 2023-11-28 PROCEDURE — 97116 GAIT TRAINING THERAPY: CPT | Mod: GP

## 2023-11-28 PROCEDURE — 64447 NJX AA&/STRD FEMORAL NRV IMG: CPT | Performed by: ANESTHESIOLOGY

## 2023-11-28 PROCEDURE — 2500000004 HC RX 250 GENERAL PHARMACY W/ HCPCS (ALT 636 FOR OP/ED): Performed by: NURSE ANESTHETIST, CERTIFIED REGISTERED

## 2023-11-28 PROCEDURE — 2500000005 HC RX 250 GENERAL PHARMACY W/O HCPCS: Performed by: ANESTHESIOLOGY

## 2023-11-28 PROCEDURE — A6213 FOAM DRG >16<=48 SQ IN W/BDR: HCPCS | Performed by: ORTHOPAEDIC SURGERY

## 2023-11-28 PROCEDURE — 2780000003 HC OR 278 NO HCPCS: Performed by: ORTHOPAEDIC SURGERY

## 2023-11-28 PROCEDURE — 3600000017 HC OR TIME - EACH INCREMENTAL 1 MINUTE - PROCEDURE LEVEL SIX: Performed by: ORTHOPAEDIC SURGERY

## 2023-11-28 PROCEDURE — A4217 STERILE WATER/SALINE, 500 ML: HCPCS | Performed by: ORTHOPAEDIC SURGERY

## 2023-11-28 PROCEDURE — 73560 X-RAY EXAM OF KNEE 1 OR 2: CPT | Mod: RIGHT SIDE | Performed by: RADIOLOGY

## 2023-11-28 PROCEDURE — 2500000001 HC RX 250 WO HCPCS SELF ADMINISTERED DRUGS (ALT 637 FOR MEDICARE OP): Performed by: ANESTHESIOLOGY

## 2023-11-28 PROCEDURE — 7100000011 HC EXTENDED STAY RECOVERY HOURLY - NURSING UNIT

## 2023-11-28 PROCEDURE — 2500000004 HC RX 250 GENERAL PHARMACY W/ HCPCS (ALT 636 FOR OP/ED): Performed by: ANESTHESIOLOGY

## 2023-11-28 PROCEDURE — 97110 THERAPEUTIC EXERCISES: CPT | Mod: GP

## 2023-11-28 PROCEDURE — RXMED WILLOW AMBULATORY MEDICATION CHARGE

## 2023-11-28 PROCEDURE — 2500000001 HC RX 250 WO HCPCS SELF ADMINISTERED DRUGS (ALT 637 FOR MEDICARE OP): Performed by: ORTHOPAEDIC SURGERY

## 2023-11-28 PROCEDURE — 3600000018 HC OR TIME - INITIAL BASE CHARGE - PROCEDURE LEVEL SIX: Performed by: ORTHOPAEDIC SURGERY

## 2023-11-28 PROCEDURE — 7100000002 HC RECOVERY ROOM TIME - EACH INCREMENTAL 1 MINUTE: Performed by: ORTHOPAEDIC SURGERY

## 2023-11-28 PROCEDURE — 97161 PT EVAL LOW COMPLEX 20 MIN: CPT | Mod: GP

## 2023-11-28 PROCEDURE — 7100000009 HC PHASE TWO TIME - INITIAL BASE CHARGE: Performed by: ORTHOPAEDIC SURGERY

## 2023-11-28 PROCEDURE — A27447 PR TOTAL KNEE ARTHROPLASTY: Performed by: ANESTHESIOLOGY

## 2023-11-28 PROCEDURE — 7100000010 HC PHASE TWO TIME - EACH INCREMENTAL 1 MINUTE: Performed by: ORTHOPAEDIC SURGERY

## 2023-11-28 PROCEDURE — 27447 TOTAL KNEE ARTHROPLASTY: CPT | Performed by: ORTHOPAEDIC SURGERY

## 2023-11-28 PROCEDURE — 2500000004 HC RX 250 GENERAL PHARMACY W/ HCPCS (ALT 636 FOR OP/ED): Performed by: ORTHOPAEDIC SURGERY

## 2023-11-28 PROCEDURE — 73560 X-RAY EXAM OF KNEE 1 OR 2: CPT | Mod: RT,FY

## 2023-11-28 PROCEDURE — 2500000005 HC RX 250 GENERAL PHARMACY W/O HCPCS: Performed by: ORTHOPAEDIC SURGERY

## 2023-11-28 PROCEDURE — C1776 JOINT DEVICE (IMPLANTABLE): HCPCS | Performed by: ORTHOPAEDIC SURGERY

## 2023-11-28 PROCEDURE — 3700000002 HC GENERAL ANESTHESIA TIME - EACH INCREMENTAL 1 MINUTE: Performed by: ORTHOPAEDIC SURGERY

## 2023-11-28 PROCEDURE — A27447 PR TOTAL KNEE ARTHROPLASTY: Performed by: NURSE ANESTHETIST, CERTIFIED REGISTERED

## 2023-11-28 PROCEDURE — 7100000001 HC RECOVERY ROOM TIME - INITIAL BASE CHARGE: Performed by: ORTHOPAEDIC SURGERY

## 2023-11-28 PROCEDURE — C1713 ANCHOR/SCREW BN/BN,TIS/BN: HCPCS | Performed by: ORTHOPAEDIC SURGERY

## 2023-11-28 PROCEDURE — 2720000007 HC OR 272 NO HCPCS: Performed by: ORTHOPAEDIC SURGERY

## 2023-11-28 PROCEDURE — 2500000005 HC RX 250 GENERAL PHARMACY W/O HCPCS: Performed by: NURSE ANESTHETIST, CERTIFIED REGISTERED

## 2023-11-28 DEVICE — IMPLANTABLE DEVICE: Type: IMPLANTABLE DEVICE | Site: KNEE | Status: FUNCTIONAL

## 2023-11-28 DEVICE — BONE CEMENT, CMW 2 , FAST SET, 20G: Type: IMPLANTABLE DEVICE | Site: KNEE | Status: FUNCTIONAL

## 2023-11-28 DEVICE — DOME, PATELLA, MEDIALIZED, 41MM: Type: IMPLANTABLE DEVICE | Site: KNEE | Status: FUNCTIONAL

## 2023-11-28 RX ORDER — TIZANIDINE 4 MG/1
4 TABLET ORAL 3 TIMES DAILY PRN
Status: CANCELLED | OUTPATIENT
Start: 2023-11-28

## 2023-11-28 RX ORDER — ACETAMINOPHEN 325 MG/1
975 TABLET ORAL ONCE
Status: COMPLETED | OUTPATIENT
Start: 2023-11-28 | End: 2023-11-28

## 2023-11-28 RX ORDER — NALOXONE HYDROCHLORIDE 1 MG/ML
0.2 INJECTION INTRAMUSCULAR; INTRAVENOUS; SUBCUTANEOUS EVERY 5 MIN PRN
Status: CANCELLED | OUTPATIENT
Start: 2023-11-28

## 2023-11-28 RX ORDER — ROPIVACAINE/EPI/CLONIDINE/KET 2.46-0.005
SYRINGE (ML) INJECTION AS NEEDED
Status: DISCONTINUED | OUTPATIENT
Start: 2023-11-28 | End: 2023-11-28 | Stop reason: HOSPADM

## 2023-11-28 RX ORDER — OXYCODONE HYDROCHLORIDE 5 MG/1
10 TABLET ORAL EVERY 4 HOURS PRN
Status: CANCELLED | OUTPATIENT
Start: 2023-11-28

## 2023-11-28 RX ORDER — LIDOCAINE HYDROCHLORIDE 20 MG/ML
INJECTION, SOLUTION EPIDURAL; INFILTRATION; INTRACAUDAL; PERINEURAL AS NEEDED
Status: DISCONTINUED | OUTPATIENT
Start: 2023-11-28 | End: 2023-11-28

## 2023-11-28 RX ORDER — DEXAMETHASONE SODIUM PHOSPHATE 4 MG/ML
INJECTION, SOLUTION INTRA-ARTICULAR; INTRALESIONAL; INTRAMUSCULAR; INTRAVENOUS; SOFT TISSUE AS NEEDED
Status: DISCONTINUED | OUTPATIENT
Start: 2023-11-28 | End: 2023-11-28

## 2023-11-28 RX ORDER — ROSUVASTATIN CALCIUM 10 MG/1
10 TABLET, COATED ORAL DAILY
Status: CANCELLED | OUTPATIENT
Start: 2023-11-28

## 2023-11-28 RX ORDER — ASPIRIN 81 MG/1
81 TABLET ORAL 2 TIMES DAILY
Status: CANCELLED | OUTPATIENT
Start: 2023-11-28

## 2023-11-28 RX ORDER — MIDAZOLAM HYDROCHLORIDE 1 MG/ML
INJECTION INTRAMUSCULAR; INTRAVENOUS AS NEEDED
Status: DISCONTINUED | OUTPATIENT
Start: 2023-11-28 | End: 2023-11-28

## 2023-11-28 RX ORDER — FONDAPARINUX SODIUM 2.5 MG/.5ML
2.5 INJECTION SUBCUTANEOUS DAILY
Status: CANCELLED | OUTPATIENT
Start: 2023-11-28

## 2023-11-28 RX ORDER — OMEPRAZOLE 40 MG/1
40 CAPSULE, DELAYED RELEASE ORAL DAILY
Status: CANCELLED | OUTPATIENT
Start: 2023-11-28

## 2023-11-28 RX ORDER — MELOXICAM 7.5 MG/1
7.5 TABLET ORAL ONCE
Status: COMPLETED | OUTPATIENT
Start: 2023-11-28 | End: 2023-11-28

## 2023-11-28 RX ORDER — FENTANYL CITRATE 50 UG/ML
INJECTION, SOLUTION INTRAMUSCULAR; INTRAVENOUS AS NEEDED
Status: DISCONTINUED | OUTPATIENT
Start: 2023-11-28 | End: 2023-11-28

## 2023-11-28 RX ORDER — DOCUSATE SODIUM 100 MG/1
100 CAPSULE, LIQUID FILLED ORAL 2 TIMES DAILY
Qty: 28 CAPSULE | Refills: 0 | Status: SHIPPED | OUTPATIENT
Start: 2023-11-28 | End: 2023-12-12

## 2023-11-28 RX ORDER — OXYCODONE HYDROCHLORIDE 5 MG/1
10 TABLET ORAL ONCE
Status: COMPLETED | OUTPATIENT
Start: 2023-11-28 | End: 2023-11-28

## 2023-11-28 RX ORDER — SODIUM CHLORIDE, SODIUM LACTATE, POTASSIUM CHLORIDE, CALCIUM CHLORIDE 600; 310; 30; 20 MG/100ML; MG/100ML; MG/100ML; MG/100ML
100 INJECTION, SOLUTION INTRAVENOUS CONTINUOUS
Status: DISCONTINUED | OUTPATIENT
Start: 2023-11-28 | End: 2023-11-28 | Stop reason: HOSPADM

## 2023-11-28 RX ORDER — OXYCODONE HYDROCHLORIDE 5 MG/1
5 TABLET ORAL EVERY 6 HOURS
Qty: 28 TABLET | Refills: 0 | Status: SHIPPED | OUTPATIENT
Start: 2023-11-28 | End: 2023-12-01 | Stop reason: SDUPTHER

## 2023-11-28 RX ORDER — PROPOFOL 10 MG/ML
INJECTION, EMULSION INTRAVENOUS CONTINUOUS PRN
Status: DISCONTINUED | OUTPATIENT
Start: 2023-11-28 | End: 2023-11-28

## 2023-11-28 RX ORDER — CEFAZOLIN 1 G/1
INJECTION, POWDER, FOR SOLUTION INTRAVENOUS AS NEEDED
Status: DISCONTINUED | OUTPATIENT
Start: 2023-11-28 | End: 2023-11-28

## 2023-11-28 RX ORDER — TRANEXAMIC ACID 100 MG/ML
INJECTION, SOLUTION INTRAVENOUS AS NEEDED
Status: DISCONTINUED | OUTPATIENT
Start: 2023-11-28 | End: 2023-11-28

## 2023-11-28 RX ORDER — GABAPENTIN 600 MG/1
600 TABLET ORAL 3 TIMES DAILY
Status: CANCELLED | OUTPATIENT
Start: 2023-11-28

## 2023-11-28 RX ORDER — LIDOCAINE HYDROCHLORIDE 10 MG/ML
0.1 INJECTION, SOLUTION EPIDURAL; INFILTRATION; INTRACAUDAL; PERINEURAL ONCE
Status: DISCONTINUED | OUTPATIENT
Start: 2023-11-28 | End: 2023-11-28 | Stop reason: HOSPADM

## 2023-11-28 RX ORDER — QUETIAPINE FUMARATE 50 MG/1
50 TABLET, FILM COATED ORAL NIGHTLY
Status: CANCELLED | OUTPATIENT
Start: 2023-11-28

## 2023-11-28 RX ORDER — ONDANSETRON HYDROCHLORIDE 2 MG/ML
INJECTION, SOLUTION INTRAVENOUS AS NEEDED
Status: DISCONTINUED | OUTPATIENT
Start: 2023-11-28 | End: 2023-11-28

## 2023-11-28 RX ORDER — SODIUM CHLORIDE 0.9 G/100ML
IRRIGANT IRRIGATION AS NEEDED
Status: DISCONTINUED | OUTPATIENT
Start: 2023-11-28 | End: 2023-11-28 | Stop reason: HOSPADM

## 2023-11-28 RX ORDER — NAPROXEN SODIUM 220 MG/1
81 TABLET, FILM COATED ORAL 2 TIMES DAILY
Qty: 4 TABLET | Refills: 0 | Status: SHIPPED | OUTPATIENT
Start: 2023-11-28 | End: 2023-11-30

## 2023-11-28 RX ORDER — BISMUTH SUBSALICYLATE 262 MG
1 TABLET,CHEWABLE ORAL DAILY
Status: CANCELLED | OUTPATIENT
Start: 2023-11-28

## 2023-11-28 RX ORDER — TRAMADOL HYDROCHLORIDE 50 MG/1
100 TABLET ORAL EVERY 6 HOURS PRN
Qty: 28 TABLET | Refills: 0 | Status: SHIPPED | OUTPATIENT
Start: 2023-11-28 | End: 2023-12-01 | Stop reason: SDUPTHER

## 2023-11-28 RX ORDER — SODIUM CHLORIDE, SODIUM LACTATE, POTASSIUM CHLORIDE, CALCIUM CHLORIDE 600; 310; 30; 20 MG/100ML; MG/100ML; MG/100ML; MG/100ML
50 INJECTION, SOLUTION INTRAVENOUS CONTINUOUS
Status: CANCELLED | OUTPATIENT
Start: 2023-11-28 | End: 2023-11-29

## 2023-11-28 RX ORDER — DOCUSATE SODIUM 100 MG/1
100 CAPSULE, LIQUID FILLED ORAL 2 TIMES DAILY
Status: CANCELLED | OUTPATIENT
Start: 2023-11-28

## 2023-11-28 RX ORDER — CYCLOBENZAPRINE HCL 10 MG
10 TABLET ORAL 3 TIMES DAILY PRN
Status: CANCELLED | OUTPATIENT
Start: 2023-11-28

## 2023-11-28 RX ORDER — ACETAMINOPHEN 325 MG/1
650 TABLET ORAL EVERY 6 HOURS SCHEDULED
Status: CANCELLED | OUTPATIENT
Start: 2023-11-28

## 2023-11-28 RX ORDER — PROPOFOL 10 MG/ML
INJECTION, EMULSION INTRAVENOUS AS NEEDED
Status: DISCONTINUED | OUTPATIENT
Start: 2023-11-28 | End: 2023-11-28

## 2023-11-28 RX ORDER — ONDANSETRON HYDROCHLORIDE 2 MG/ML
4 INJECTION, SOLUTION INTRAVENOUS ONCE AS NEEDED
Status: COMPLETED | OUTPATIENT
Start: 2023-11-28 | End: 2023-11-28

## 2023-11-28 RX ORDER — BUPROPION HYDROCHLORIDE 100 MG/1
100 TABLET ORAL 3 TIMES DAILY
Status: CANCELLED | OUTPATIENT
Start: 2023-11-28

## 2023-11-28 RX ORDER — WATER 1 ML/ML
IRRIGANT IRRIGATION AS NEEDED
Status: DISCONTINUED | OUTPATIENT
Start: 2023-11-28 | End: 2023-11-28 | Stop reason: HOSPADM

## 2023-11-28 RX ORDER — IRBESARTAN AND HYDROCHLOROTHIAZIDE 150; 12.5 MG/1; MG/1
1 TABLET, FILM COATED ORAL DAILY
Status: CANCELLED | OUTPATIENT
Start: 2023-11-28

## 2023-11-28 RX ORDER — PREGABALIN 75 MG/1
75 CAPSULE ORAL ONCE
Status: DISCONTINUED | OUTPATIENT
Start: 2023-11-28 | End: 2023-11-28 | Stop reason: HOSPADM

## 2023-11-28 RX ORDER — TRANEXAMIC ACID 650 MG/1
1950 TABLET ORAL ONCE
Status: COMPLETED | OUTPATIENT
Start: 2023-11-28 | End: 2023-11-28

## 2023-11-28 RX ORDER — OXYCODONE HYDROCHLORIDE 5 MG/1
5 TABLET ORAL EVERY 6 HOURS PRN
Status: CANCELLED | OUTPATIENT
Start: 2023-11-28

## 2023-11-28 RX ORDER — KETOROLAC TROMETHAMINE 30 MG/ML
INJECTION, SOLUTION INTRAMUSCULAR; INTRAVENOUS AS NEEDED
Status: DISCONTINUED | OUTPATIENT
Start: 2023-11-28 | End: 2023-11-28

## 2023-11-28 RX ADMIN — TRANEXAMIC ACID 1950 MG: 650 TABLET ORAL at 16:31

## 2023-11-28 RX ADMIN — ACETAMINOPHEN 975 MG: 325 TABLET ORAL at 09:38

## 2023-11-28 RX ADMIN — DEXAMETHASONE SODIUM PHOSPHATE 8 MG: 4 INJECTION, SOLUTION INTRAMUSCULAR; INTRAVENOUS at 11:53

## 2023-11-28 RX ADMIN — FENTANYL CITRATE 25 MCG: 50 INJECTION, SOLUTION INTRAMUSCULAR; INTRAVENOUS at 10:34

## 2023-11-28 RX ADMIN — MIDAZOLAM HYDROCHLORIDE 1 MG: 1 INJECTION, SOLUTION INTRAMUSCULAR; INTRAVENOUS at 11:36

## 2023-11-28 RX ADMIN — SODIUM CHLORIDE, POTASSIUM CHLORIDE, SODIUM LACTATE AND CALCIUM CHLORIDE 100 ML/HR: 600; 310; 30; 20 INJECTION, SOLUTION INTRAVENOUS at 09:38

## 2023-11-28 RX ADMIN — PROPOFOL 40 MG: 10 INJECTION, EMULSION INTRAVENOUS at 10:32

## 2023-11-28 RX ADMIN — MIDAZOLAM HYDROCHLORIDE 2 MG: 1 INJECTION, SOLUTION INTRAMUSCULAR; INTRAVENOUS at 10:15

## 2023-11-28 RX ADMIN — FENTANYL CITRATE 50 MCG: 50 INJECTION, SOLUTION INTRAMUSCULAR; INTRAVENOUS at 10:15

## 2023-11-28 RX ADMIN — HYDROMORPHONE HYDROCHLORIDE 0.5 MG: 1 INJECTION, SOLUTION INTRAMUSCULAR; INTRAVENOUS; SUBCUTANEOUS at 14:24

## 2023-11-28 RX ADMIN — ONDANSETRON 4 MG: 2 INJECTION INTRAMUSCULAR; INTRAVENOUS at 12:02

## 2023-11-28 RX ADMIN — FENTANYL CITRATE 25 MCG: 50 INJECTION, SOLUTION INTRAMUSCULAR; INTRAVENOUS at 10:47

## 2023-11-28 RX ADMIN — HYDROMORPHONE HYDROCHLORIDE 0.5 MG: 1 INJECTION, SOLUTION INTRAMUSCULAR; INTRAVENOUS; SUBCUTANEOUS at 13:46

## 2023-11-28 RX ADMIN — KETOROLAC TROMETHAMINE 30 MG: 30 INJECTION, SOLUTION INTRAMUSCULAR at 12:50

## 2023-11-28 RX ADMIN — MIDAZOLAM HYDROCHLORIDE 1 MG: 1 INJECTION, SOLUTION INTRAMUSCULAR; INTRAVENOUS at 11:04

## 2023-11-28 RX ADMIN — PROPOFOL 200 MCG/KG/MIN: 10 INJECTION, EMULSION INTRAVENOUS at 10:32

## 2023-11-28 RX ADMIN — ONDANSETRON 4 MG: 2 INJECTION INTRAMUSCULAR; INTRAVENOUS at 13:54

## 2023-11-28 RX ADMIN — POVIDONE-IODINE 1 APPLICATION: 5 SOLUTION TOPICAL at 09:38

## 2023-11-28 RX ADMIN — CEFAZOLIN 3 G: 1 INJECTION, POWDER, FOR SOLUTION INTRAMUSCULAR; INTRAVENOUS at 10:31

## 2023-11-28 RX ADMIN — SODIUM CHLORIDE, SODIUM LACTATE, POTASSIUM CHLORIDE, AND CALCIUM CHLORIDE: 600; 310; 30; 20 INJECTION, SOLUTION INTRAVENOUS at 10:02

## 2023-11-28 RX ADMIN — TRANEXAMIC ACID 1000 MG: 1 INJECTION, SOLUTION INTRAVENOUS at 10:31

## 2023-11-28 RX ADMIN — Medication 3 G: at 16:35

## 2023-11-28 RX ADMIN — HYDROMORPHONE HYDROCHLORIDE 0.5 MG: 1 INJECTION, SOLUTION INTRAMUSCULAR; INTRAVENOUS; SUBCUTANEOUS at 13:55

## 2023-11-28 RX ADMIN — Medication 3 L/MIN: at 13:30

## 2023-11-28 RX ADMIN — LIDOCAINE HYDROCHLORIDE 60 MG: 20 INJECTION, SOLUTION EPIDURAL; INFILTRATION; INTRACAUDAL; PERINEURAL at 10:34

## 2023-11-28 RX ADMIN — OXYCODONE HYDROCHLORIDE 10 MG: 5 TABLET ORAL at 14:24

## 2023-11-28 RX ADMIN — MELOXICAM 7.5 MG: 7.5 TABLET ORAL at 09:38

## 2023-11-28 RX ADMIN — HYDROMORPHONE HYDROCHLORIDE 0.5 MG: 1 INJECTION, SOLUTION INTRAMUSCULAR; INTRAVENOUS; SUBCUTANEOUS at 13:39

## 2023-11-28 RX ADMIN — HYDROMORPHONE HYDROCHLORIDE 0.5 MG: 1 INJECTION, SOLUTION INTRAMUSCULAR; INTRAVENOUS; SUBCUTANEOUS at 14:08

## 2023-11-28 ASSESSMENT — PAIN - FUNCTIONAL ASSESSMENT

## 2023-11-28 ASSESSMENT — PAIN SCALES - GENERAL
PAINLEVEL_OUTOF10: 5 - MODERATE PAIN
PAINLEVEL_OUTOF10: 5 - MODERATE PAIN
PAINLEVEL_OUTOF10: 7
PAINLEVEL_OUTOF10: 7
PAINLEVEL_OUTOF10: 8
PAINLEVEL_OUTOF10: 8
PAINLEVEL_OUTOF10: 5 - MODERATE PAIN
PAINLEVEL_OUTOF10: 5 - MODERATE PAIN
PAINLEVEL_OUTOF10: 6
PAIN_LEVEL: 0
PAINLEVEL_OUTOF10: 6
PAINLEVEL_OUTOF10: 7
PAINLEVEL_OUTOF10: 8
PAINLEVEL_OUTOF10: 8
PAINLEVEL_OUTOF10: 0 - NO PAIN
PAINLEVEL_OUTOF10: 8
PAINLEVEL_OUTOF10: 8
PAINLEVEL_OUTOF10: 5 - MODERATE PAIN
PAINLEVEL_OUTOF10: 8
PAINLEVEL_OUTOF10: 5 - MODERATE PAIN
PAINLEVEL_OUTOF10: 0 - NO PAIN
PAINLEVEL_OUTOF10: 0 - NO PAIN

## 2023-11-28 ASSESSMENT — COGNITIVE AND FUNCTIONAL STATUS - GENERAL
WALKING IN HOSPITAL ROOM: A LITTLE
TURNING FROM BACK TO SIDE WHILE IN FLAT BAD: A LITTLE
MOBILITY SCORE: 18
CLIMB 3 TO 5 STEPS WITH RAILING: A LITTLE
STANDING UP FROM CHAIR USING ARMS: A LITTLE
MOVING FROM LYING ON BACK TO SITTING ON SIDE OF FLAT BED WITH BEDRAILS: A LITTLE
MOVING TO AND FROM BED TO CHAIR: A LITTLE

## 2023-11-28 ASSESSMENT — ACTIVITIES OF DAILY LIVING (ADL): ADL_ASSISTANCE: INDEPENDENT

## 2023-11-28 ASSESSMENT — COLUMBIA-SUICIDE SEVERITY RATING SCALE - C-SSRS
2. HAVE YOU ACTUALLY HAD ANY THOUGHTS OF KILLING YOURSELF?: NO
1. IN THE PAST MONTH, HAVE YOU WISHED YOU WERE DEAD OR WISHED YOU COULD GO TO SLEEP AND NOT WAKE UP?: NO
6. HAVE YOU EVER DONE ANYTHING, STARTED TO DO ANYTHING, OR PREPARED TO DO ANYTHING TO END YOUR LIFE?: NO

## 2023-11-28 ASSESSMENT — PAIN DESCRIPTION - DESCRIPTORS: DESCRIPTORS: ACHING;RADIATING

## 2023-11-28 NOTE — OP NOTE
Right Knee Total Replacement (R) Operative Note     Date: 2023  OR Location: [facility] A OR    Name: Cj Vargas, : 1960, Age: 63 y.o., MRN: [id number], Sex: male    Diagnosis  Pre-op Diagnosis     * Arthritis of knee [M17.10]     * Chronic pain of right knee [M25.561, G89.29] Post-op Diagnosis     * Arthritis of knee [M17.10]     * Chronic pain of right knee [M25.561, G89.29]     Procedures  Right Knee Total Replacement  07427 - KS ARTHRP KNE CONDYLE&PLATU MEDIAL&LAT COMPARTMENTS      Surgeons      * Tony Donovan - Primary    Resident/Fellow/Other Assistant:  Surgeon(s) and Role:     * Chin Patel MD - Resident - Assisting    Procedure Summary  Anesthesia: General  ASA: II  Anesthesia Staff: Anesthesiologist: Diaz Bella MD  CRNA: Nato Pinto APRN-CRNA  C-AA: ANUJA Gaston  Estimated Blood Loss: 0mL  Intra-op Medications:   Medication Name Total Dose   sodium chloride 0.9 % irrigation solution 3,000 mL              Anesthesia Record               Intraprocedure I/O Totals          Intake    Propofol Drip 0.00 mL    The total shown is the total volume documented since Anesthesia Start was filed.    Total Intake 0 mL          Specimen: No specimens collected     Staff:   Circulator: Rica Tucker RN  Relief Circulator: Mya Caro RN; Tomeka Amos RN  Scrub Person: Jessi Hackett; Stacia Mayfield; Lydia Emmanuel         Drains and/or Catheters: * None in log *    Tourniquet Times:   * Missing tourniquet times found for documented tourniquets in lo *     Implants:  Implants       Type Name Action Serial No.      Joint Knee BONE CEMENT, CMW 2 , FAST SET, 20G - [id number] Implanted      Joint Knee DOME, PATELLA, MEDIALIZED, 41MM - [id number] Implanted      Joint Knee FEMORAL, ATTUNE PS, KARLEE, SZ 9, RT - [id number] Implanted      Joint Knee TIBAL BASE ATTUNE FB, SZ 9 KARLEE - [id number] Implanted      Joint Knee INSERT, ATTUNE PS FB, SZ 9, 5MM - [id number] Implanted                Findings: end stage OA R knee    Indications: Cj Vargas is an 63 y.o. male who is having surgery for Arthritis of knee [M17.10]  Chronic pain of right knee [M25.561, G89.29]. End stage  OA R knee. Failed conservative treatment.    The patient was seen in the preoperative area. The risks, benefits, complications, treatment options, non-operative alternatives, expected recovery and outcomes were discussed with the patient. The possibilities of reaction to medication, pulmonary aspiration, injury to surrounding structures, bleeding, recurrent infection, the need for additional procedures, failure to diagnose a condition, and creating a complication requiring transfusion or operation were discussed with the patient. The patient concurred with the proposed plan, giving informed consent.  The site of surgery was properly noted/marked if necessary per policy. The patient has been actively warmed in preoperative area. Preoperative antibiotics have been ordered and given within 1 hours of incision. Venous thrombosis prophylaxis are not indicated.    Procedure Details:   Preop DX:  End-stage DJD right knee   Postop DX: Same  Procedure: Right total knee replacement  Surgeon: Tony Donovan MD  Asst: Calderon Ojeda MD  Anesthesia: Spinal and regional  Implants: DePuy Fleck - The Bigger Pictureune knee:  femur 9 PS, tibia 9, poly-5 mm, patellar button 41 mm.  Clinical note: 63-year-old male end-stage arthrosis right knee.  Failed conservative treatment.  Here for knee replacement.  Understood the risk of surgery including bleeding, infection, DVT, wound healing problems, stiffness, possible need for further surgery or manipulation.  Understood these risks and wished to proceed.  Procedure Note: Patient brought to operating room after regional anesthesia.  Timeout performed.   Antibiotics and 1 g TXA given IV.  Spinal anesthesia given.  Right leg was prepped and draped in typical sterile fashion with a tourniquet on the thigh.  Leg was  exsanguinated and thigh tourniquet was inflated to 325 mmHg.  Anterior approach to the knee was performed : incision made through skin down to extensor mechanism.  Full-thickness flap elevated off the retinaculum.  Medial parapatellar arthrotomy was performed.  Grade 4 changes were noted.    Provisional medial and lateral release was performed.  Provisional medial and lateral meniscus were excised.  Distal femoral bone cuts were performed.  Size 9 trial fit nicely.  Tibia was subluxed.  Tibial cut was performed and  size 9 trial fit nicely.  Was reduced with a 5 mm millimeter poly.  Knee came to full extension.  Good medial lateral stability.  Patella was cut freehand.  Sized for a 41 mm button.  Patella button tracked centrally with no touch technique.  Femoral rotation lugs were drilled.  Proximal tibia was drilled and keel cut.  Bone was pulse lavaged and dried.  Components were placed with cement.    Knee was in full extension while cement polymerized.  Wound was irrigated.  Arthrotomy repaired with interrupted suture.  Wound was closed in layers.  Dressed with soft compressive dressing.  Patient tolerated procedure well was taken recovery room in stable condition.    Complications:  None; patient tolerated the procedure well.    Disposition: PACU - hemodynamically stable.  Condition: stable         Additional Details: none    Attending Attestation: I was present and scrubbed for the entire procedure.    Tony Donovan  Phone Number: [phone]

## 2023-11-28 NOTE — ANESTHESIA PROCEDURE NOTES
Peripheral Block    Start time: 11/28/2023 10:00 AM  End time: 11/28/2023 10:08 AM  Reason for block: at surgeon's request and post-op pain management  Staffing  Performed: attending   Authorized by: Diaz Bella MD    Performed by: Diaz Bella MD  Preanesthetic Checklist  Completed: patient identified, IV checked, site marked, risks and benefits discussed, surgical consent, monitors and equipment checked, pre-op evaluation and timeout performed   Timeout performed at: 11/28/2023 10:00 AM  Peripheral Block  Patient position: laying flat  Prep: ChloraPrep  Patient monitoring: continuous pulse ox  Block type: adductor canal  Injection technique: single-shot  Guidance: ultrasound guided  Needle  Needle type: Sprotte tip   Needle gauge: 22 G  Needle length: 8 cm  Needle localization: ultrasound guidance  Needle insertion depth: 3 cm  Assessment  Injection assessment: negative aspiration for heme, no paresthesia on injection, incremental injection and local visualized surrounding nerve on ultrasound  Heart rate change: no  Slow fractionated injection: yes  Additional Notes  0.5% Ropiv 20ml, 10 ml of NS, 4 mg decadron with 150 mcg of epinephrine was used. No complications.

## 2023-11-28 NOTE — ANESTHESIA PREPROCEDURE EVALUATION
Patient: Cj Vargas    Procedure Information       Date/Time: 11/28/23 1030    Procedure: Right Knee Total Replacement (Right: Knee) - Spinal and Regional; **POD**    Location: [facility] OR 11 / Virtual [facility] OR    Surgeons: Tony Donovan MD            Relevant Problems   Cardiovascular   (+) Acute pulmonary embolism, unspecified pulmonary embolism type, unspecified whether acute cor pulmonale present (CMS/HCC)   (+) Benign essential hypertension   (+) Essential familial hyperlipidemia   (+) Pulmonary embolism (CMS/HCC)      Endocrine   (+) Morbid obesity (CMS/HCC)      GI   (+) Acid reflux      Neuro/Psych   (+) Panic disorder   (+) Peripheral neuropathy      Pulmonary   (+) Acute pulmonary embolism, unspecified pulmonary embolism type, unspecified whether acute cor pulmonale present (CMS/HCC)   (+) Obstructive sleep apnea syndrome in adult   (+) Pulmonary embolism (CMS/HCC)      Hematology   (+) Anemia      Musculoskeletal   (+) Lumbar spinal stenosis   (+) Osteoarthritis of both knees   (+) Osteoarthritis of right knee      Other   (+) Arthritis   (+) Arthritis of knee       Clinical information reviewed:    Allergies  Meds               NPO Detail:  NPO/Void Status  Date of Last Liquid: 11/28/23  Time of Last Liquid: 0500  Date of Last Solid: 11/27/23  Time of Last Solid: 2000         Physical Exam    Airway  Mallampati: III  TM distance: >3 FB  Neck ROM: full     Cardiovascular - normal exam     Dental - normal exam     Pulmonary - normal exam     Abdominal - normal exam             Anesthesia Plan    ASA 2     spinal   (AC block for postop pain control consented)  Anesthetic plan and risks discussed with patient.    Plan discussed with CAA and CRNA.

## 2023-11-28 NOTE — BRIEF OP NOTE
Date: 2023  OR Location: [facility] OR    Name: Cj Vargas, : 1960, Age: 63 y.o., MRN: [id number], Sex: male    Diagnosis  Pre-op Diagnosis     * Arthritis of knee [M17.10]     * Chronic pain of right knee [M25.561, G89.29] Post-op Diagnosis     * Arthritis of knee [M17.10]     * Chronic pain of right knee [M25.561, G89.29]     Procedures  Right Knee Total Replacement  49744 - CT ARTHRP KNE CONDYLE&PLATU MEDIAL&LAT COMPARTMENTS      Surgeons      * Tony Donovan - Primary    Resident/Fellow/Other Assistant:  Surgeon(s) and Role:     * Chin Patel MD - Resident - Assisting    Procedure Summary  Anesthesia: General  ASA: II  Anesthesia Staff: Anesthesiologist: Diaz Bella MD  CRNA: KINGA Hernandez-CRNA  C-AA: ANUJA Gaston  Estimated Blood Loss: 0mL  Intra-op Medications:   Medication Name Total Dose   sodium chloride 0.9 % irrigation solution 3,000 mL              Anesthesia Record               Intraprocedure I/O Totals          Intake    Propofol Drip 0.00 mL    The total shown is the total volume documented since Anesthesia Start was filed.    Total Intake 0 mL          Specimen: No specimens collected     Staff:   Circulator: Rica Tucker RN  Relief Circulator: Mya Caro RN  Scrub Person: Jessi Hackett; Stacia Emmanuel          Findings: end stage OA R knee    Complications:  None; patient tolerated the procedure well.     Disposition: PACU - hemodynamically stable.  Condition: stable  Specimens Collected: No specimens collected  Attending Attestation: I was present and scrubbed for the entire procedure.    Tony Donovan  Phone Number: [phone]

## 2023-11-28 NOTE — PROGRESS NOTES
Physical Therapy    Physical Therapy Evaluation & Treatment    Patient Name: Cj Vargas  MRN: [id number]  Today's Date: 11/28/2023   Time Calculation  Start Time: 1550  Stop Time: 1623  Time Calculation (min): 33 min    Assessment/Plan   PT Assessment  PT Assessment Results: Decreased strength, Decreased range of motion, Decreased endurance, Impaired balance, Decreased mobility, Decreased coordination, Impaired sensation, Orthopedic restrictions, Pain  Rehab Prognosis: Good  Evaluation/Treatment Tolerance: Patient tolerated treatment well  Medical Staff Made Aware: Yes  Strengths: Ability to acquire knowledge, Insight into problems, Attitude of self, Support of Caregivers  Barriers to Participation: Housing layout  End of Session Communication: Bedside nurse  Assessment Comment: PT eval complete. Minimal hands on assist needed throughout session, more tactile cuing for form, sequencing, and precautions follow as just s/p TKR. Familiar with protocol from prior TKR, anticipate quick progression. Will need continued PT for addressing functional deficits.  End of Session Patient Position: On cart   IP OR SWING BED PT PLAN  Inpatient or Swing Bed: Inpatient  PT Plan  Treatment/Interventions: Bed mobility, Transfer training, Gait training, Stair training, Balance training, Neuromuscular re-education, Strengthening, Endurance training, Range of motion, Therapeutic exercise, Therapeutic activity, Home exercise program, Positioning  PT Plan: Skilled PT  PT Frequency: BID  PT Discharge Recommendations: Low intensity level of continued care  Equipment Recommended upon Discharge: Wheeled walker  PT Recommended Transfer Status: Stand by assist  PT - OK to Discharge: Yes (PT POC initiated)      Subjective     General Visit Information:  General  Reason for Referral: s/p TKR  Referred By: Tony Donovan MD  Past Medical History Relevant to Rehab:   Past Medical History:   Diagnosis Date    Anemia     BPH (benign prostatic  hyperplasia)     Depression     DVT (deep venous thrombosis) (CMS/[facility]) 2021    GERD (gastroesophageal reflux disease)     Hyperlipidemia     Hypertension     Neuropathy     OA (osteoarthritis)     Obese     RADHA on CPAP     Panic disorder     Preoperative clearance 10/19/2023    Cleared by Dr. Juan Antonio Harris 3 days prior to surgery (instructions in office note)    Pulmonary embolism on long-term anticoagulation therapy (CMS/[facility]) 2021    Eliquis    Unspecified cataract     Cataracts, both eyes    Varicose veins of both lower extremities      Past Surgical History:   Procedure Laterality Date    ANKLE Right 12/2021    arthroscopy    ANKLE Right 2023    athroscopy    APPENDECTOMY  1970    COLONOSCOPY      GASTRIC BYPASS  2017    sleeve gastrectomy    KNEE Right 2018    arthroscopy    KNEE ARTHROPLASTY Left 2013    LUMBAR FUSION      l3-4    TONSILLECTOMY  1974       Family/Caregiver Present: Yes  Caregiver Feedback: Spouse present and encouraging throughout  Prior to Session Communication: Bedside nurse  Patient Position Received: On cart  Preferred Learning Style: visual, verbal  General Comment: Pt supine on cart s/p TKR, very pleasant and participatory. Single instance of lightheadedness, reports it passed and performing all mobility for safe home going  Home Living:  Home Living  Type of Home: House  Lives With: Spouse  Home Adaptive Equipment: Walker rolling or standard, Crutches  Home Layout: Multi-level, Bed/bath upstairs, Stairs to alternate level with rails  Alternate Level Stairs-Rails: Left  Alternate Level Stairs-Number of Steps: 14  Home Access: Stairs to enter with rails  Entrance Stairs-Rails: Left  Entrance Stairs-Number of Steps: 3  Bathroom Shower/Tub: Walk-in shower  Bathroom Toilet: Standard, Adaptive toilet seating (Will be buying elevated toilet seat without frame on way home)  Prior Level of Function:  Prior Function Per Pt/Caregiver Report  Level of Lee: Independent with ADLs and  functional transfers  Receives Help From: Family  ADL Assistance: Independent  Homemaking Assistance: Independent  Ambulatory Assistance: Independent (Denies device use prior to sx, denies falls)  Prior Function Comments: MOD I /c ADLS, IADLs, and driving  Precautions:  Precautions  Hearing/Visual Limitations: Reading glasses only  LE Weight Bearing Status: Weight Bearing as Tolerated (RLE)  Medical Precautions: Fall precautions  Post-Surgical Precautions: Right total knee precautions  Vital Signs:  Vital Signs  Heart Rate: 84 (>75 >84)  Heart Rate Source: Monitor  SpO2: 94 % (>93 >92 on room air throughout session)  BP: 139/81 (>143/83 >137/86)  MAP (mmHg): 99 (>99 >100)  BP Location: Right arm  BP Method: Automatic  Patient Position: Lying (> sitting > standing)    Objective   Pain:  Pain Assessment  Pain Assessment: 0-10  Pain Score: 6  Pain Type: Surgical pain  Pain Location: Knee  Pain Orientation: Right  Pain Interventions: Repositioned, Ambulation/increased activity, Distraction, Cold applied  Cognition:  Cognition  Overall Cognitive Status: Within Functional Limits  Orientation Level: Oriented X4    General Assessments:                Activity Tolerance  Endurance: Tolerates 30 min exercise with multiple rests    Sensation  Sensation Comment: Reports neuropathy to BLE from back issues, from ankles to toes decreased sensation; RLE decreased sensation compared to LLE    Strength  Strength Comments: WFL for functional mobility           Coordination  Movements are Fluid and Coordinated: Yes    Postural Control  Postural Control: Within Functional Limits  Head Control: WFL  Trunk Control: WFL  Posture Comment: Occasional cuing for upright posture/gaze    Static Sitting Balance  Static Sitting-Balance Support: Feet supported, No upper extremity supported  Static Sitting-Level of Assistance: Distant supervision  Dynamic Sitting Balance  Dynamic Sitting-Balance Support: Feet supported, No upper extremity  supported  Dynamic Sitting-Balance: Forward lean, Trunk control activities    Static Standing Balance  Static Standing-Balance Support: Bilateral upper extremity supported  Static Standing-Level of Assistance: Contact guard  Dynamic Standing Balance  Dynamic Standing-Balance Support: Bilateral upper extremity supported  Dynamic Standing-Balance: Turning  Functional Assessments:  Bed Mobility  Bed Mobility: Yes  Bed Mobility 1  Bed Mobility 1: Supine to sitting  Level of Assistance 1: Close supervision  Bed Mobility Comments 1: Needing no hands on assist, but verbal cues and close guard as pt coming close to EOB prior to having BLE on floor to catch self. Cues throughout for hand placement, sequencing, form, breathing, and precautions.    Transfers  Transfer: Yes  Transfer 1  Technique 1: Sit to stand, Stand to sit  Transfer Device 1: Walker, Gait belt  Transfer Level of Assistance 1: Contact guard  Trials/Comments 1: Trial x2, cues for hand placement, good return demo. Cues for sequencing, precautions follow. Needing no true hands on assist, but CGA due to reports of lightheadedness with initial stance.    Ambulation/Gait Training  Ambulation/Gait Training Performed: Yes  Ambulation/Gait Training 1  Surface 1: Level tile  Device 1: Rolling walker  Gait Support Devices: Gait belt  Assistance 1: Contact guard  Quality of Gait 1: Narrow base of support, Diminished heel strike, Decreased step length, Antalgic, Soft knee(s) (No true hands on assist needed, cuing for sequencing and upright posture. No overt LOB, pt denies lightheadedness throughout.)  Comments/Distance (ft) 1: 75' x2    Stairs  Stairs: Yes  Stairs  Rails 1: Left  Device 1: Railing, Single crutch  Support Devices 1: Gait belt  Assistance 1: Contact guard  Comment/Number of Steps 1: 2+4 (Trial x2, initial trial x2 steps, ascent facing throughout navigation, CGA for stabilization, cuing for sequencing and device assist. Trial x4 steps with standard  "sequencing and CGA for pt comfort.)  Extremity/Trunk Assessments:  RUE   RUE : Within Functional Limits  LUE   LUE: Within Functional Limits  RLE   RLE : Exceptions to WFL  AROM RLE (degrees)  RLE AROM Comment: Ankle WFL, but pt reports can get \"drop foot\" when more tired. Knee at EOB full extension, flexion to 80 degrees, hip WFL  Strength RLE  RLE Overall Strength: Other (Comment) (</= 3-/5)  LLE   LLE : Within Functional Limits  Treatments:  Therapeutic Exercise  Therapeutic Exercise Performed: Yes  Therapeutic Exercise Activity 1: Performing HEP for demo and pt comfort: AP,QS,GS x5 BLE, HS,SAQ,SLR,LAQ,HC x5 RLE only (Cues for timing, breathing, form, sequencing, and purpose throughout.)    Other Activity  Other Activity Performed: Yes  Other Activity 1: Increased gait over and above assessment needs, increased education to pt and family about HEP/precautions packet s/p TKR.  Outcome Measures:  [facility] Basic Mobility  Turning from your back to your side while in a flat bed without using bedrails: A little  Moving from lying on your back to sitting on the side of a flat bed without using bedrails: A little  Moving to and from bed to chair (including a wheelchair): A little  Standing up from a chair using your arms (e.g. wheelchair or bedside chair): A little  To walk in hospital room: A little  Climbing 3-5 steps with railing: A little  Basic Mobility - Total Score: 18    Encounter Problems       Encounter Problems (Active)       Balance       STG - Maintains dynamic standing balance with upper extremity support At MOD I level, safely, no LOB        Start:  11/28/23    Expected End:  12/12/23       INTERVENTIONS:  1. Practice standing with minimal support.  2. Educate patient about standing tolerance.  3. Educate patient about independence with gait, transfers, and ADL's.  4. Educate patient about use of assistive device.  5. Educate patient about self-directed care.            Mobility       STG - Patient will " ambulate >/= 400', device PRN, Mod I, no LOB, Heel toe gait cycle, RLE WBAT         Start:  11/28/23    Expected End:  12/12/23            STG - Pt will perform stair navigation for full flight of steps At MOD I level, safely, no LOB, Left handrail         Start:  11/28/23    Expected End:  12/12/23            Pt demo's ability to perform AROM of R knee at EOB from 0-90 degrees        Start:  11/28/23    Expected End:  12/12/23               Safety       Pt will verbalize and apply safety awareness and precautions 100% of time throughout entire session         Start:  11/28/23    Expected End:  12/12/23               Transfers       STG - Patient will perform bed mobility At MOD I level, safely, no LOB        Start:  11/28/23    Expected End:  12/12/23            STG - Patient will transfer sit to and from stand At MOD I level, safely, no LOB        Start:  11/28/23    Expected End:  12/12/23            STG - Patient will perform car transfer At MOD I level, safely, no LOB        Start:  11/28/23    Expected End:  12/12/23                   Education Documentation  Handouts, taught by Patrick Tom PT at 11/28/2023  5:21 PM.  Learner: Significant Other, Patient  Readiness: Acceptance  Method: Demonstration, Handout, Explanation  Response: Needs Reinforcement, Verbalizes Understanding    Precautions, taught by Patrick Tom PT at 11/28/2023  5:21 PM.  Learner: Significant Other, Patient  Readiness: Acceptance  Method: Demonstration, Handout, Explanation  Response: Needs Reinforcement, Verbalizes Understanding    Body Mechanics, taught by Patrick Tom PT at 11/28/2023  5:21 PM.  Learner: Significant Other, Patient  Readiness: Acceptance  Method: Demonstration, Handout, Explanation  Response: Needs Reinforcement, Verbalizes Understanding    Home Exercise Program, taught by Patrick Tom PT at 11/28/2023  5:21 PM.  Learner: Significant Other, Patient  Readiness: Acceptance  Method:  Demonstration, Handout, Explanation  Response: Needs Reinforcement, Verbalizes Understanding    Mobility Training, taught by Patrick Tom, PT at 11/28/2023  5:21 PM.  Learner: Significant Other, Patient  Readiness: Acceptance  Method: Demonstration, Handout, Explanation  Response: Needs Reinforcement, Verbalizes Understanding    Education Comments  No comments found.

## 2023-11-28 NOTE — PERIOPERATIVE NURSING NOTE
1254: Patient to bay with anesthesia and surgical team present. Handoff report and plan of care reviewed, all questions answered. VSS.    1305: Nasal airway removed at this time    1310: xray at bedside. Pt removed from supplemental 02    1330: pt placed on 2L nasal cannula    1339: 0.5mg dilaudid administered per given orders. Allergies verified prior to administration.     1346: 0.5mg dilaudid administered per given orders. Allergies verified prior to administration.  1mg dilaudid total in PACU.    1354: 4mg zofran administered per given orders for pt report of nausea. Allergies verified prior to administration.    1355:  0.5mg dilaudid administered per given orders. Allergies verified prior to administration.  1.5mg dilaudid total in PACU.    1408: 0.5mg dilaudid administered per given orders. Allergies verified prior to administration.  Dose pulled and administered by Elizabeth PERSAUD. 2mg dilaudid total in PACU.    1415: handoff to Elizabeth PERSAUD    1445: handoff received from Elizabeth PERSAUD; pt removed from supplemental oxygen at this time    1515: Significant other at bedside    1525: spoke with meds to bed pharmacist Sol via [employer] regarding patients home medications     1530: spoke with Patrick PT via [employer] regarding pt ready to work; Patient tolerating goldfish, saltines, and ginger ale at this time    1550: Meds to bed pharmacist at bedside at this time    1600: Patrick PT at bedside at this time    1625: pt passed PT per Patrick Physical Therapist     1631: TXA PO dose administered at this time    1635: Ancef 3g hung IV piggyback at this time    1700: discharge instructions reviewed with patient and family at this time, all questions answered.    1705: Ancef complete at this time    1715: PIV removed at this time, no complications. Catheter intact upon removal.    1720: Pt dressed with family assistance at this time.    1721:  contacted at this time    1730: pt ambulated to bathroom with walker with RN assistance.  Unmeasured urine occurrence at this time    1734: pt to main lobby via RN transport in stable condition with all belongings.

## 2023-11-28 NOTE — DISCHARGE INSTRUCTIONS
Additional instructions  Ice/Elevate operative extremity.  OK to shower.  Keep Mepilex dressing in place.  Weightbearing: WBAT on operative extremity with crutches/walker.   Home PT to visit POD #1 or 2.   PT to remove bulky Ace dressing.  Start Tylenol 650 mg by mouth every 6 hours  for pain while block is working.  Start Oxycodone 1 by mouth every 6 hours as needed for pain as block wears off.  Tramadol 1 tablet every 6  hours as needed for breakthrough pain.  Colace 100 mg twice a day for constipation.  Has at home  Restart Eliquis at home.  Start POD #1.  F/U with Dr. Donovan in 2 weeks.  Call [phone] for appointment time.

## 2023-11-28 NOTE — ANESTHESIA POSTPROCEDURE EVALUATION
Patient: Cj Vargas    Procedure Summary       Date: 11/28/23 Room / [facility]: [facility] / [facility]    Anesthesia Start: 1014 Anesthesia Stop: 1302    Procedure: Right Knee Total Replacement (Right: Knee) Diagnosis:       Arthritis of knee      Chronic pain of right knee      (Arthritis of knee [M17.10])      (Chronic pain of right knee [M25.561, G89.29])    Surgeons: Tony Donovan MD Responsible Provider: Diaz Bella MD    Anesthesia Type: spinal ASA Status: 2            Anesthesia Type: spinal    Vitals Value Taken Time   /93 11/28/23 1350   Temp 36.6 °C (97.9 °F) 11/28/23 1254   Pulse 74 11/28/23 1355   Resp 11 11/28/23 1355   SpO2 94 % 11/28/23 1355   Vitals shown include unvalidated device data.    Anesthesia Post Evaluation    Patient location during evaluation: PACU  Patient participation: complete - patient participated  Level of consciousness: awake  Pain score: 0  Pain management: adequate  Airway patency: patent  Cardiovascular status: acceptable  Respiratory status: acceptable  Hydration status: acceptable  Postoperative Nausea and Vomiting: none        No notable events documented.

## 2023-11-29 ENCOUNTER — HOME CARE VISIT (OUTPATIENT)
Dept: HOME HEALTH SERVICES | Facility: HOME HEALTH | Age: 63
End: 2023-11-29
Payer: COMMERCIAL

## 2023-11-29 VITALS
OXYGEN SATURATION: 93 % | SYSTOLIC BLOOD PRESSURE: 128 MMHG | RESPIRATION RATE: 16 BRPM | HEART RATE: 87 BPM | DIASTOLIC BLOOD PRESSURE: 60 MMHG | TEMPERATURE: 99.9 F

## 2023-11-29 PROCEDURE — G0151 HHCP-SERV OF PT,EA 15 MIN: HCPCS

## 2023-11-29 PROCEDURE — 0023 HH SOC

## 2023-11-29 SDOH — HEALTH STABILITY: PHYSICAL HEALTH: EXERCISE COMMENTS: SUPINE QUAD SETS, ANKLE PUMPS, HEEL SLIDES, SAQ  SITTING KNEE FLEXION, LAQ 10 REPS

## 2023-11-29 ASSESSMENT — ENCOUNTER SYMPTOMS
HIGHEST PAIN SEVERITY IN PAST 24 HOURS: 8/10
PAIN SEVERITY GOAL: 1/10
PAIN: 1
LOWEST PAIN SEVERITY IN PAST 24 HOURS: 4/10
SUBJECTIVE PAIN PROGRESSION: UNCHANGED
PAIN LOCATION: RIGHT KNEE
OCCASIONAL FEELINGS OF UNSTEADINESS: 1
PERSON REPORTING PAIN: PATIENT
HYPERTENSION: 1
LIMITED RANGE OF MOTION: 1

## 2023-11-29 ASSESSMENT — ACTIVITIES OF DAILY LIVING (ADL)
OASIS_M1830: 05
AMBULATION_DISTANCE/DURATION_TOLERATED: 40 FEET
AMBULATION ASSISTANCE: 1
AMBULATION ASSISTANCE: STAND BY ASSIST
ENTERING_EXITING_HOME: ONE PERSON
AMBULATION ASSISTANCE ON FLAT SURFACES: 1

## 2023-11-30 NOTE — HOME HEALTH
Patient is an 63 yr old male who had R TKR at [facility] 11.28.23 by Dr Donovan. PMH L TKR 2015, lumbar fusion L3-4, right ankle arthroscopy 2021, 2023, HTN, GERD, PE 2021, neuropathy B feet right greater then left, anemia, BPH, gastric bypass sleeve 2017, OA. PFS Patient lives in home with wife, ambulating without device indep, indep ADLS, driving, working.Patient to see Dr Donovan in 2 weeks for follow up and remove dressing. PT removed ace wrap today. Patient currently ambulating with wheeled walker. Patient instructed in sitting and supine exercises, signs and symptoms of blood clots, pain control with ice. Patient currently staying on first floor sleeping in recliner. Discussed outpatient therapy with patient to call and schedule so no interruption when homecare discharges. PT 2w3

## 2023-12-01 ENCOUNTER — HOME CARE VISIT (OUTPATIENT)
Dept: HOME HEALTH SERVICES | Facility: HOME HEALTH | Age: 63
End: 2023-12-01
Payer: COMMERCIAL

## 2023-12-01 DIAGNOSIS — M17.10 ARTHRITIS OF KNEE: Primary | ICD-10-CM

## 2023-12-01 PROCEDURE — G0157 HHC PT ASSISTANT EA 15: HCPCS

## 2023-12-01 RX ORDER — OXYCODONE HYDROCHLORIDE 5 MG/1
5 TABLET ORAL EVERY 6 HOURS
Qty: 28 TABLET | Refills: 0 | Status: SHIPPED | OUTPATIENT
Start: 2023-12-01 | End: 2023-12-08

## 2023-12-01 RX ORDER — TRAMADOL HYDROCHLORIDE 50 MG/1
100 TABLET ORAL EVERY 6 HOURS PRN
Qty: 28 TABLET | Refills: 0 | Status: SHIPPED | OUTPATIENT
Start: 2023-12-01 | End: 2024-03-12 | Stop reason: WASHOUT

## 2023-12-01 SDOH — HEALTH STABILITY: PHYSICAL HEALTH: EXERCISE COMMENTS: WIFE SHOWN HOW TO ASSIST WITH SAQ

## 2023-12-01 ASSESSMENT — ENCOUNTER SYMPTOMS
LOWEST PAIN SEVERITY IN PAST 24 HOURS: 8/10
SUBJECTIVE PAIN PROGRESSION: GRADUALLY WORSENING
PAIN: 1
HIGHEST PAIN SEVERITY IN PAST 24 HOURS: 9/10

## 2023-12-02 ENCOUNTER — TELEPHONE (OUTPATIENT)
Dept: ORTHOPEDIC SURGERY | Facility: CLINIC | Age: 63
End: 2023-12-02
Payer: COMMERCIAL

## 2023-12-02 DIAGNOSIS — M17.10 ARTHRITIS OF KNEE: Primary | ICD-10-CM

## 2023-12-02 RX ORDER — OXYCODONE HYDROCHLORIDE 5 MG/1
5 TABLET ORAL EVERY 6 HOURS PRN
Qty: 28 TABLET | Refills: 0 | Status: SHIPPED | OUTPATIENT
Start: 2023-12-02 | End: 2023-12-09

## 2023-12-02 RX ORDER — TRAMADOL HYDROCHLORIDE 50 MG/1
50 TABLET ORAL EVERY 6 HOURS PRN
Qty: 28 TABLET | Refills: 0 | Status: SHIPPED | OUTPATIENT
Start: 2023-12-02 | End: 2023-12-11 | Stop reason: SDUPTHER

## 2023-12-04 ENCOUNTER — HOME CARE VISIT (OUTPATIENT)
Dept: HOME HEALTH SERVICES | Facility: HOME HEALTH | Age: 63
End: 2023-12-04
Payer: COMMERCIAL

## 2023-12-04 PROCEDURE — G0157 HHC PT ASSISTANT EA 15: HCPCS

## 2023-12-04 SDOH — HEALTH STABILITY: PHYSICAL HEALTH: EXERCISE COMMENTS: PT STILL HAVING INCREASED PAIN WITH ANY KNEE MOVEMENT. STILL NEEDS MOD ASSIST WITH SAQ

## 2023-12-04 ASSESSMENT — ACTIVITIES OF DAILY LIVING (ADL)
AMBULATION_DISTANCE/DURATION_TOLERATED: 60 FEET
AMBULATION ASSISTANCE ON FLAT SURFACES: 1

## 2023-12-06 ENCOUNTER — HOME CARE VISIT (OUTPATIENT)
Dept: HOME HEALTH SERVICES | Facility: HOME HEALTH | Age: 63
End: 2023-12-06
Payer: COMMERCIAL

## 2023-12-06 VITALS
DIASTOLIC BLOOD PRESSURE: 80 MMHG | OXYGEN SATURATION: 95 % | SYSTOLIC BLOOD PRESSURE: 142 MMHG | HEART RATE: 84 BPM | RESPIRATION RATE: 16 BRPM | TEMPERATURE: 99.6 F

## 2023-12-06 DIAGNOSIS — M17.10 ARTHRITIS OF KNEE: ICD-10-CM

## 2023-12-06 DIAGNOSIS — Z96.651 STATUS POST RIGHT KNEE REPLACEMENT: ICD-10-CM

## 2023-12-06 DIAGNOSIS — M17.10 ARTHRITIS OF KNEE: Primary | ICD-10-CM

## 2023-12-06 PROCEDURE — G0151 HHCP-SERV OF PT,EA 15 MIN: HCPCS

## 2023-12-06 RX ORDER — OXYCODONE HYDROCHLORIDE 5 MG/1
5 TABLET ORAL EVERY 6 HOURS PRN
Qty: 28 TABLET | Refills: 0 | Status: SHIPPED | OUTPATIENT
Start: 2023-12-06 | End: 2023-12-11 | Stop reason: SDUPTHER

## 2023-12-06 RX ORDER — TRAMADOL HYDROCHLORIDE 50 MG/1
50 TABLET ORAL EVERY 6 HOURS PRN
Qty: 28 TABLET | Refills: 0 | Status: SHIPPED | OUTPATIENT
Start: 2023-12-06 | End: 2023-12-20

## 2023-12-06 SDOH — HEALTH STABILITY: PHYSICAL HEALTH
EXERCISE COMMENTS: SUPINE QUAD SETS, HEEL SLIDES, SAQ WITH ASSIST FOR END RANGE, PATIENT ABLE TO INITIATED AND IMPROVED WITH REPS   SITTING KNEE FLEXION, LAQ NEEDED ASSIST     ALL EXERCISES 10 REPS

## 2023-12-06 ASSESSMENT — ENCOUNTER SYMPTOMS
PAIN: 1
PERSON REPORTING PAIN: PATIENT
PAIN LOCATION: RIGHT KNEE
PAIN SEVERITY GOAL: 1/10
SUBJECTIVE PAIN PROGRESSION: GRADUALLY IMPROVING
LOWEST PAIN SEVERITY IN PAST 24 HOURS: 3/10
HIGHEST PAIN SEVERITY IN PAST 24 HOURS: 10/10
MUSCLE WEAKNESS: 1
LIMITED RANGE OF MOTION: 1

## 2023-12-06 ASSESSMENT — ACTIVITIES OF DAILY LIVING (ADL)
AMBULATION ASSISTANCE: 1
AMBULATION ASSISTANCE: STAND BY ASSIST
AMBULATION_DISTANCE/DURATION_TOLERATED: 40 FEET
AMBULATION ASSISTANCE ON FLAT SURFACES: 1

## 2023-12-06 NOTE — Clinical Note
Saw patient today. Mepilex dressing intact and no drainage. Swelling looks better and skin not shiny or taut. Right knee AROM 0 to 85 degress flexion. Patient is walking a little more now with knee immobilzer and walker. Pain continue to be an issue for him. He reports will be out of both pain meds this afternoon. He reports taking 2 oxy and 2 tramadol alternating. We have this week and next week however would like to extend another 1 to 2 weeks because I dont believe he will be ready for outpatient. Thank you!

## 2023-12-06 NOTE — HOME HEALTH
Patient reports yesterday was a better day and able to walk to bathroom. Patient reports taking pain meds but will be out of them this afternoon. Patient reports is using ice but not able to tolerate the cold for long periods of time. Mepilex dressing has no drainage, swelling looks better and skin not shiny and loose, bruising noted. Instructed patient he needs to keep walking and bending knee often. Patient is wearing knee immobilizer  and walker to ambulate. Will extend PT as patient will not be ready for outpatient 2 weeks from surgery. Patients right knee was 0 to 85 degrees today. Patient also did ambulate with walker and knee brace into kitchen and back to reclinar.

## 2023-12-06 NOTE — Clinical Note
I believe he is!!  ----- Message -----  From: Tony Donovan MD  Sent: 12/6/2023  11:40 AM EST  To: Zoila Soares PT      I will send in pain med refills. Thanks for the follow up. OK to continue home PT. Is he seeing me next week?  ----- Message -----  From: Zoila Soares PT  Sent: 12/6/2023  10:14 AM EST  To: Tony Donovan MD    Saw patient today. Mepilex dressing intact and no drainage. Swelling looks better and skin not shiny or taut. Right knee AROM 0 to 85 degress flexion. Patient is walking a little more now with knee immobilzer and walker. Pain continue to be an issue for him. He reports will be out of both pain meds this afternoon. He reports taking 2 oxy and 2 tramadol alternating. We have this week and next week however would like to extend another 1 to 2 weeks because I dont believe he will be ready for outpatient. Thank you!

## 2023-12-07 ENCOUNTER — HOME CARE VISIT (OUTPATIENT)
Dept: HOME HEALTH SERVICES | Facility: HOME HEALTH | Age: 63
End: 2023-12-07
Payer: COMMERCIAL

## 2023-12-07 PROCEDURE — G0157 HHC PT ASSISTANT EA 15: HCPCS

## 2023-12-07 ASSESSMENT — ENCOUNTER SYMPTOMS
SUBJECTIVE PAIN PROGRESSION: GRADUALLY IMPROVING
HIGHEST PAIN SEVERITY IN PAST 24 HOURS: 9/10
PAIN SEVERITY GOAL: 1/10
PAIN: 1
LOWEST PAIN SEVERITY IN PAST 24 HOURS: 2/10

## 2023-12-07 ASSESSMENT — ACTIVITIES OF DAILY LIVING (ADL)
AMBULATION ASSISTANCE ON FLAT SURFACES: 1
AMBULATION_DISTANCE/DURATION_TOLERATED: 75 FEET

## 2023-12-10 PROCEDURE — G0180 MD CERTIFICATION HHA PATIENT: HCPCS

## 2023-12-11 ENCOUNTER — APPOINTMENT (OUTPATIENT)
Dept: OPHTHALMOLOGY | Facility: CLINIC | Age: 63
End: 2023-12-11
Payer: COMMERCIAL

## 2023-12-11 ENCOUNTER — HOME CARE VISIT (OUTPATIENT)
Dept: HOME HEALTH SERVICES | Facility: HOME HEALTH | Age: 63
End: 2023-12-11
Payer: COMMERCIAL

## 2023-12-11 DIAGNOSIS — M17.10 ARTHRITIS OF KNEE: ICD-10-CM

## 2023-12-11 DIAGNOSIS — Z96.651 STATUS POST RIGHT KNEE REPLACEMENT: ICD-10-CM

## 2023-12-11 PROCEDURE — G0157 HHC PT ASSISTANT EA 15: HCPCS

## 2023-12-11 ASSESSMENT — ENCOUNTER SYMPTOMS
PAIN: 1
PERSON REPORTING PAIN: PATIENT
HIGHEST PAIN SEVERITY IN PAST 24 HOURS: 8/10
PAIN SEVERITY GOAL: 1/10
SUBJECTIVE PAIN PROGRESSION: GRADUALLY IMPROVING
LOWEST PAIN SEVERITY IN PAST 24 HOURS: 3/10

## 2023-12-11 ASSESSMENT — ACTIVITIES OF DAILY LIVING (ADL): AMBULATION ASSISTANCE ON FLAT SURFACES: 1

## 2023-12-12 DIAGNOSIS — Z96.651 STATUS POST RIGHT KNEE REPLACEMENT: ICD-10-CM

## 2023-12-12 DIAGNOSIS — M17.10 ARTHRITIS OF KNEE: ICD-10-CM

## 2023-12-12 RX ORDER — OXYCODONE HYDROCHLORIDE 5 MG/1
5 TABLET ORAL EVERY 6 HOURS PRN
Qty: 28 TABLET | Refills: 0 | Status: SHIPPED | OUTPATIENT
Start: 2023-12-12 | End: 2023-12-12 | Stop reason: SDUPTHER

## 2023-12-12 RX ORDER — OXYCODONE HYDROCHLORIDE 5 MG/1
5 TABLET ORAL EVERY 6 HOURS PRN
Qty: 28 TABLET | Refills: 0 | Status: SHIPPED | OUTPATIENT
Start: 2023-12-12 | End: 2023-12-15 | Stop reason: SDUPTHER

## 2023-12-12 RX ORDER — TRAMADOL HYDROCHLORIDE 50 MG/1
50 TABLET ORAL EVERY 6 HOURS PRN
Qty: 28 TABLET | Refills: 0 | Status: SHIPPED | OUTPATIENT
Start: 2023-12-12 | End: 2023-12-12 | Stop reason: SDUPTHER

## 2023-12-12 RX ORDER — TRAMADOL HYDROCHLORIDE 50 MG/1
50 TABLET ORAL EVERY 6 HOURS PRN
Qty: 28 TABLET | Refills: 0 | Status: SHIPPED | OUTPATIENT
Start: 2023-12-12 | End: 2023-12-15 | Stop reason: SDUPTHER

## 2023-12-13 ENCOUNTER — ANCILLARY PROCEDURE (OUTPATIENT)
Dept: RADIOLOGY | Facility: CLINIC | Age: 63
End: 2023-12-13
Payer: COMMERCIAL

## 2023-12-13 ENCOUNTER — OFFICE VISIT (OUTPATIENT)
Dept: ORTHOPEDIC SURGERY | Facility: CLINIC | Age: 63
End: 2023-12-13
Payer: COMMERCIAL

## 2023-12-13 VITALS — WEIGHT: 295 LBS | HEIGHT: 72 IN | BODY MASS INDEX: 39.96 KG/M2

## 2023-12-13 DIAGNOSIS — M25.561 RIGHT KNEE PAIN, UNSPECIFIED CHRONICITY: ICD-10-CM

## 2023-12-13 DIAGNOSIS — Z96.651 S/P TOTAL KNEE REPLACEMENT, RIGHT: ICD-10-CM

## 2023-12-13 DIAGNOSIS — Z96.651 S/P TOTAL KNEE REPLACEMENT, RIGHT: Primary | ICD-10-CM

## 2023-12-13 PROCEDURE — 1036F TOBACCO NON-USER: CPT | Performed by: ORTHOPAEDIC SURGERY

## 2023-12-13 PROCEDURE — 73562 X-RAY EXAM OF KNEE 3: CPT | Mod: RT

## 2023-12-13 PROCEDURE — 99024 POSTOP FOLLOW-UP VISIT: CPT | Performed by: ORTHOPAEDIC SURGERY

## 2023-12-13 PROCEDURE — 73562 X-RAY EXAM OF KNEE 3: CPT | Mod: RIGHT SIDE | Performed by: RADIOLOGY

## 2023-12-13 NOTE — PROGRESS NOTES
S: Pain well controlled.  Taking 2 oxycodone at a time with Tylenol.  Intermittent tramadol.  Doing home therapy.  Ready for outpatient therapy.    O: Incisions healing nicely. Good alignment.  Lacks few degrees in full extension.  Flexes past 90.  Moderate swelling.    XR: I personally reviewed the following radiographic exams: Right knee shows well-fixed well aligned cemented total knee.    A: S/P right total knee    P: Begin outpatient therapy.  Wean from narcotics.  Follow-up examination in 3 weeks.

## 2023-12-14 ENCOUNTER — HOME CARE VISIT (OUTPATIENT)
Dept: HOME HEALTH SERVICES | Facility: HOME HEALTH | Age: 63
End: 2023-12-14
Payer: COMMERCIAL

## 2023-12-14 PROCEDURE — G0157 HHC PT ASSISTANT EA 15: HCPCS

## 2023-12-14 SDOH — HEALTH STABILITY: PHYSICAL HEALTH: EXERCISE COMMENTS: PT STILL HAVING HIGH PAIN LEVELS MOVING HIS KNEE

## 2023-12-14 ASSESSMENT — ACTIVITIES OF DAILY LIVING (ADL)
AMBULATION ASSISTANCE ON FLAT SURFACES: 1
AMBULATION_DISTANCE/DURATION_TOLERATED: 120 FEET

## 2023-12-14 ASSESSMENT — ENCOUNTER SYMPTOMS
PAIN: 1
LOWEST PAIN SEVERITY IN PAST 24 HOURS: 3/10
SUBJECTIVE PAIN PROGRESSION: WAXING AND WANING
PAIN SEVERITY GOAL: 1/10
HIGHEST PAIN SEVERITY IN PAST 24 HOURS: 8/10
PERSON REPORTING PAIN: PATIENT

## 2023-12-15 ENCOUNTER — HOME CARE VISIT (OUTPATIENT)
Dept: HOME HEALTH SERVICES | Facility: HOME HEALTH | Age: 63
End: 2023-12-15
Payer: COMMERCIAL

## 2023-12-15 DIAGNOSIS — M17.10 ARTHRITIS OF KNEE: ICD-10-CM

## 2023-12-15 DIAGNOSIS — Z96.651 STATUS POST RIGHT KNEE REPLACEMENT: ICD-10-CM

## 2023-12-15 PROCEDURE — G0157 HHC PT ASSISTANT EA 15: HCPCS

## 2023-12-15 ASSESSMENT — ENCOUNTER SYMPTOMS
PERSON REPORTING PAIN: PATIENT
LOWEST PAIN SEVERITY IN PAST 24 HOURS: 1/10
PAIN: 1
HIGHEST PAIN SEVERITY IN PAST 24 HOURS: 5/10
SUBJECTIVE PAIN PROGRESSION: GRADUALLY IMPROVING
PAIN SEVERITY GOAL: 1/10

## 2023-12-17 RX ORDER — TRAMADOL HYDROCHLORIDE 50 MG/1
50 TABLET ORAL EVERY 6 HOURS PRN
Qty: 28 TABLET | Refills: 0 | Status: SHIPPED | OUTPATIENT
Start: 2023-12-17 | End: 2023-12-26 | Stop reason: SDUPTHER

## 2023-12-17 RX ORDER — OXYCODONE HYDROCHLORIDE 5 MG/1
5 TABLET ORAL EVERY 6 HOURS PRN
Qty: 28 TABLET | Refills: 0 | Status: SHIPPED | OUTPATIENT
Start: 2023-12-17 | End: 2023-12-26 | Stop reason: SDUPTHER

## 2023-12-18 ENCOUNTER — HOME CARE VISIT (OUTPATIENT)
Dept: HOME HEALTH SERVICES | Facility: HOME HEALTH | Age: 63
End: 2023-12-18
Payer: COMMERCIAL

## 2023-12-18 PROCEDURE — G0157 HHC PT ASSISTANT EA 15: HCPCS

## 2023-12-18 ASSESSMENT — ACTIVITIES OF DAILY LIVING (ADL): AMBULATION ASSISTANCE ON FLAT SURFACES: 1

## 2023-12-18 ASSESSMENT — ENCOUNTER SYMPTOMS
PAIN: 1
HIGHEST PAIN SEVERITY IN PAST 24 HOURS: 8/10
LOWEST PAIN SEVERITY IN PAST 24 HOURS: 1/10
PERSON REPORTING PAIN: PATIENT
SUBJECTIVE PAIN PROGRESSION: GRADUALLY IMPROVING
PAIN SEVERITY GOAL: 1/10

## 2023-12-19 DIAGNOSIS — M62.838 MUSCLE SPASM: ICD-10-CM

## 2023-12-20 RX ORDER — TIZANIDINE 4 MG/1
4 TABLET ORAL 3 TIMES DAILY
Qty: 270 TABLET | Refills: 0 | Status: SHIPPED | OUTPATIENT
Start: 2023-12-20 | End: 2024-03-11

## 2023-12-21 ENCOUNTER — HOME CARE VISIT (OUTPATIENT)
Dept: HOME HEALTH SERVICES | Facility: HOME HEALTH | Age: 63
End: 2023-12-21
Payer: COMMERCIAL

## 2023-12-21 PROCEDURE — G0157 HHC PT ASSISTANT EA 15: HCPCS

## 2023-12-21 ASSESSMENT — ACTIVITIES OF DAILY LIVING (ADL)
AMBULATION ASSISTANCE ON FLAT SURFACES: 1
AMBULATION_DISTANCE/DURATION_TOLERATED: 150

## 2023-12-21 ASSESSMENT — ENCOUNTER SYMPTOMS
SUBJECTIVE PAIN PROGRESSION: GRADUALLY IMPROVING
PAIN SEVERITY GOAL: 1/10
PAIN: 1
PERSON REPORTING PAIN: PATIENT
PAIN LOCATION: LEFT KNEE
HIGHEST PAIN SEVERITY IN PAST 24 HOURS: 6/10
LOWEST PAIN SEVERITY IN PAST 24 HOURS: 1/10

## 2023-12-26 ENCOUNTER — HOME CARE VISIT (OUTPATIENT)
Dept: HOME HEALTH SERVICES | Facility: HOME HEALTH | Age: 63
End: 2023-12-26
Payer: COMMERCIAL

## 2023-12-26 DIAGNOSIS — M17.10 ARTHRITIS OF KNEE: ICD-10-CM

## 2023-12-26 DIAGNOSIS — Z96.651 STATUS POST RIGHT KNEE REPLACEMENT: ICD-10-CM

## 2023-12-26 PROCEDURE — G0157 HHC PT ASSISTANT EA 15: HCPCS

## 2023-12-26 RX ORDER — TRAMADOL HYDROCHLORIDE 50 MG/1
50 TABLET ORAL EVERY 6 HOURS PRN
Qty: 20 TABLET | Refills: 0 | Status: SHIPPED | OUTPATIENT
Start: 2023-12-26 | End: 2023-12-29 | Stop reason: SDUPTHER

## 2023-12-26 RX ORDER — OXYCODONE HYDROCHLORIDE 5 MG/1
5 TABLET ORAL EVERY 6 HOURS PRN
Qty: 20 TABLET | Refills: 0 | Status: SHIPPED | OUTPATIENT
Start: 2023-12-26 | End: 2023-12-29 | Stop reason: SDUPTHER

## 2023-12-26 ASSESSMENT — ENCOUNTER SYMPTOMS
HIGHEST PAIN SEVERITY IN PAST 24 HOURS: 8/10
PAIN SEVERITY GOAL: 1/10
SUBJECTIVE PAIN PROGRESSION: GRADUALLY IMPROVING
PERSON REPORTING PAIN: PATIENT
PAIN: 1
LOWEST PAIN SEVERITY IN PAST 24 HOURS: 1/10

## 2023-12-29 ENCOUNTER — HOME CARE VISIT (OUTPATIENT)
Dept: HOME HEALTH SERVICES | Facility: HOME HEALTH | Age: 63
End: 2023-12-29
Payer: COMMERCIAL

## 2023-12-29 VITALS
SYSTOLIC BLOOD PRESSURE: 146 MMHG | DIASTOLIC BLOOD PRESSURE: 78 MMHG | TEMPERATURE: 99 F | OXYGEN SATURATION: 95 % | HEART RATE: 74 BPM

## 2023-12-29 DIAGNOSIS — Z96.651 STATUS POST RIGHT KNEE REPLACEMENT: ICD-10-CM

## 2023-12-29 DIAGNOSIS — M17.10 ARTHRITIS OF KNEE: ICD-10-CM

## 2023-12-29 PROCEDURE — 0023 HH SOC

## 2023-12-29 PROCEDURE — G0151 HHCP-SERV OF PT,EA 15 MIN: HCPCS

## 2023-12-29 RX ORDER — TRAMADOL HYDROCHLORIDE 50 MG/1
50 TABLET ORAL EVERY 6 HOURS PRN
Qty: 20 TABLET | Refills: 0 | Status: SHIPPED | OUTPATIENT
Start: 2023-12-29 | End: 2024-01-05 | Stop reason: SDUPTHER

## 2023-12-29 RX ORDER — OXYCODONE HYDROCHLORIDE 5 MG/1
5 TABLET ORAL EVERY 6 HOURS PRN
Qty: 20 TABLET | Refills: 0 | Status: SHIPPED | OUTPATIENT
Start: 2023-12-29 | End: 2024-01-05 | Stop reason: SDUPTHER

## 2023-12-29 SDOH — HEALTH STABILITY: PHYSICAL HEALTH
EXERCISE COMMENTS: STANDING HIP FLEXION, KNEE FLEXION  DISCUSSED IMPORTANCE OF CONTINUE SUPINE EXERCISES QUAD SETS, HEEL SLIDES

## 2023-12-29 ASSESSMENT — ENCOUNTER SYMPTOMS
PERSON REPORTING PAIN: PATIENT
HIGHEST PAIN SEVERITY IN PAST 24 HOURS: 4/10
PAIN LOCATION: RIGHT KNEE
PAIN: 1
LOWEST PAIN SEVERITY IN PAST 24 HOURS: 2/10
SUBJECTIVE PAIN PROGRESSION: GRADUALLY IMPROVING
PAIN LOCATION: BACK
PAIN SEVERITY GOAL: 1/10

## 2023-12-29 ASSESSMENT — ACTIVITIES OF DAILY LIVING (ADL)
AMBULATION ASSISTANCE: 1
AMBULATION ASSISTANCE ON FLAT SURFACES: 1
OASIS_M1830: 01
AMBULATION ASSISTANCE: INDEPENDENT
HOME_HEALTH_OASIS: 00

## 2023-12-29 NOTE — TELEPHONE ENCOUNTER
Patient left voicemail requesting refill on oxycodone and tramadol. Stated he will be out tomorrow afternoon.

## 2024-01-03 ENCOUNTER — OFFICE VISIT (OUTPATIENT)
Dept: ORTHOPEDIC SURGERY | Facility: CLINIC | Age: 64
End: 2024-01-03
Payer: COMMERCIAL

## 2024-01-03 VITALS — WEIGHT: 295 LBS | BODY MASS INDEX: 39.96 KG/M2 | HEIGHT: 72 IN

## 2024-01-03 DIAGNOSIS — Z96.651 S/P TOTAL KNEE REPLACEMENT, RIGHT: Primary | ICD-10-CM

## 2024-01-03 PROCEDURE — 1036F TOBACCO NON-USER: CPT | Performed by: ORTHOPAEDIC SURGERY

## 2024-01-03 PROCEDURE — 99024 POSTOP FOLLOW-UP VISIT: CPT | Performed by: ORTHOPAEDIC SURGERY

## 2024-01-03 NOTE — PROGRESS NOTES
S: Pain well controlled.  Starting outpatient therapy.  Still having a fair amount of pain from his back going down his leg.  Having some pain over the lateral knee.    O: Incisions healing nicely.  Small suture scabs the superior and inferior aspect.  No drainage.  Good alignment.  Near full extension.  Flexes past 110.  Minimal effusion.    A: S/P right total knee    P: Small sutures removed.  Encouraged decreasing narcotic usage.  Continue ice after therapy.  Follow-up x-ray and examination in 2 months.

## 2024-01-05 DIAGNOSIS — M17.10 ARTHRITIS OF KNEE: ICD-10-CM

## 2024-01-05 DIAGNOSIS — Z96.651 STATUS POST RIGHT KNEE REPLACEMENT: ICD-10-CM

## 2024-01-05 RX ORDER — TRAMADOL HYDROCHLORIDE 50 MG/1
50 TABLET ORAL EVERY 6 HOURS PRN
Qty: 20 TABLET | Refills: 0 | Status: SHIPPED | OUTPATIENT
Start: 2024-01-05 | End: 2024-01-12

## 2024-01-05 RX ORDER — OXYCODONE HYDROCHLORIDE 5 MG/1
5 TABLET ORAL EVERY 6 HOURS PRN
Qty: 20 TABLET | Refills: 0 | Status: SHIPPED | OUTPATIENT
Start: 2024-01-05 | End: 2024-01-12

## 2024-01-09 ENCOUNTER — TELEPHONE (OUTPATIENT)
Dept: ORTHOPEDIC SURGERY | Facility: CLINIC | Age: 64
End: 2024-01-09
Payer: COMMERCIAL

## 2024-01-09 NOTE — TELEPHONE ENCOUNTER
Cj Vargas left voicemail asking for suggestions on what could be done to ease his  pain during therapy. He stated that he was prescribed prednisone before which helped with the inflammation. He's looking for suggestions other than ice or lidocaine patch.

## 2024-01-10 DIAGNOSIS — Z96.651 S/P TOTAL KNEE REPLACEMENT, RIGHT: ICD-10-CM

## 2024-01-11 RX ORDER — PREDNISONE 10 MG/1
TABLET ORAL
Qty: 42 TABLET | Refills: 0 | Status: SHIPPED | OUTPATIENT
Start: 2024-01-11 | End: 2024-02-01

## 2024-01-17 ENCOUNTER — PATIENT OUTREACH (OUTPATIENT)
Dept: PRIMARY CARE | Facility: CLINIC | Age: 64
End: 2024-01-17
Payer: COMMERCIAL

## 2024-02-01 ENCOUNTER — OFFICE VISIT (OUTPATIENT)
Dept: ORTHOPEDIC SURGERY | Facility: CLINIC | Age: 64
End: 2024-02-01
Payer: COMMERCIAL

## 2024-02-01 VITALS — BODY MASS INDEX: 39.96 KG/M2 | WEIGHT: 295 LBS | HEIGHT: 72 IN

## 2024-02-01 DIAGNOSIS — M54.16 LUMBAR RADICULOPATHY: Primary | ICD-10-CM

## 2024-02-01 PROCEDURE — 1036F TOBACCO NON-USER: CPT | Performed by: ORTHOPAEDIC SURGERY

## 2024-02-01 PROCEDURE — 99213 OFFICE O/P EST LOW 20 MIN: CPT | Performed by: ORTHOPAEDIC SURGERY

## 2024-02-01 RX ORDER — CYANOCOBALAMIN 500 UG/1
SPRAY, METERED NASAL
COMMUNITY

## 2024-02-01 RX ORDER — ENOXAPARIN SODIUM 150 MG/ML
INJECTION SUBCUTANEOUS
COMMUNITY
End: 2024-03-06 | Stop reason: WASHOUT

## 2024-02-01 RX ORDER — DICLOFENAC POTASSIUM 50 MG/1
TABLET, FILM COATED ORAL
COMMUNITY
End: 2024-03-06 | Stop reason: WASHOUT

## 2024-02-01 RX ORDER — PREGABALIN 100 MG/1
CAPSULE ORAL
COMMUNITY
End: 2024-03-06 | Stop reason: WASHOUT

## 2024-02-01 RX ORDER — CHLORHEXIDINE GLUCONATE 40 MG/ML
SOLUTION TOPICAL
COMMUNITY
Start: 2022-01-04 | End: 2024-03-06 | Stop reason: WASHOUT

## 2024-02-01 RX ORDER — ATORVASTATIN CALCIUM 40 MG/1
TABLET, FILM COATED ORAL
COMMUNITY
End: 2024-03-06 | Stop reason: WASHOUT

## 2024-02-01 ASSESSMENT — PAIN SCALES - GENERAL: PAINLEVEL_OUTOF10: 5 - MODERATE PAIN

## 2024-02-01 ASSESSMENT — PAIN - FUNCTIONAL ASSESSMENT: PAIN_FUNCTIONAL_ASSESSMENT: 0-10

## 2024-02-01 NOTE — LETTER
February 1, 2024     Damion Ruiz MD  [address] [place] [address] Rd  [address]  [facility] [address]    Patient: Cj Vargas   YOB: 1960   Date of Visit: 2/1/2024       Dear Dr. Damion Ruiz MD:    Thank you for referring Cj Vargas to me for evaluation. Below are my notes for this consultation.  If you have questions, please do not hesitate to call me. I look forward to following your patient along with you.       Sincerely,     Chin Morris MD      CC: No Recipients  ______________________________________________________________________________________    Count returns for follow-up.  He had a successful right total knee replacement last year.  As he has been rehabbing, he is bothered a bit more by right-sided low back right buttock and posterior thigh pain.  It is not as severe nor is disabling as he had prior to surgery but it is bothering him.  He has completed physical therapy for his knee.    On exam, he has a stable nonantalgic gait.  Painless motion both hips.  His strength is intact.    Prior imaging if showed he has been well decompressed at L3-4.  There is mild stenosis at L2-3 from hypertrophic ligamentum flavum.    He has developed some adjacent level lumbar disease with resulting right-sided lumbar radiculopathy.  I do not think he needs to consider further surgery.  I think a consultation with Dr. Poe for a epidural or selective nerve block would be the next step.  He will try this and he will keep us updated on his progress.    ** Dictated with voice recognition software and not immediately reviewed for errors in grammar and/or spelling **

## 2024-02-01 NOTE — PROGRESS NOTES
returns for follow-up.  He had a successful right total knee replacement last year.  As he has been rehabbing, he is bothered a bit more by right-sided low back right buttock and posterior thigh pain.  It is not as severe nor is disabling as he had prior to surgery but it is bothering him.  He has completed physical therapy for his knee.    On exam, he has a stable nonantalgic gait.  Painless motion both hips.  His strength is intact.    Prior imaging if showed he has been well decompressed at L3-4.  There is mild stenosis at L2-3 from hypertrophic ligamentum flavum.    He has developed some adjacent level lumbar disease with resulting right-sided lumbar radiculopathy.  I do not think he needs to consider further surgery.  I think a consultation with Dr. Poe for a epidural or selective nerve block would be the next step.  He will try this and he will keep us updated on his progress.    ** Dictated with voice recognition software and not immediately reviewed for errors in grammar and/or spelling **

## 2024-02-02 ENCOUNTER — TELEMEDICINE (OUTPATIENT)
Dept: PAIN MEDICINE | Facility: HOSPITAL | Age: 64
End: 2024-02-02
Payer: COMMERCIAL

## 2024-02-02 DIAGNOSIS — M54.16 LUMBAR RADICULOPATHY: ICD-10-CM

## 2024-02-02 DIAGNOSIS — M48.062 SPINAL STENOSIS OF LUMBAR REGION WITH NEUROGENIC CLAUDICATION: Primary | ICD-10-CM

## 2024-02-02 PROCEDURE — 99204 OFFICE O/P NEW MOD 45 MIN: CPT | Performed by: ANESTHESIOLOGY

## 2024-02-02 NOTE — PROGRESS NOTES
Chief complaint: Back pain    ERICKA Corona is a 63-year-old male with a history of back pain and prior back surgery.  The patient is being referred to see me by Dr. Morris with orthopedic spine surgery.  The patient does have a history of a posterior lateral fusion/decompression with discectomy at L3-4 which was done in January 2022.  The patient says that following his back surgery with Dr. Morris he did extremely well and it greatly reduced his pain in the legs.      The patient says he has had a longstanding history of chronic pain in the back.  He notes that he started pain treatments for his spine around 2013/2014.  Says when he first started pain management he was living in [place] and went to the [place] [facility] [place] where he underwent a number of procedures including trigger points, epidurals, facet blocks all of which targeted around the lower back.  He was being considered for surgery at that time but ultimately moved to [place] and then the pandemic happened.  He was seen at the [facility] and had treatment there as well but ultimately underwent the lumbar fusion with Dr. Morris.  He says that the pain did improve after surgery but he has had more so right-sided pain all along.  He has bilateral leg pain which can be burning and tingling.  He notes some pain low in his back as well as high above his prior surgery.  Most the pain he feels is in the lateral legs though he has some pain that can be either anterior or posterior in the thighs.  He did have a knee replacement last year and that helped with his gait.  He has been doing formal physical therapy for months and says that also helped quiet down the back and leg symptoms.  He has tried a number of measures including lidocaine, physical therapy, Norco, Tylenol, NSAIDs, tizanidine, and more recently prednisone which did help to reduce his pain.  He feels the best when sleeping and worst if he has to do anything for prolonged period of time.   Walking too long, standing too long, sitting too long could all make the symptoms worse.    He last had an MRI in July 2023 and he says he has not had any major symptom changes since then, actually he feels overall better in terms of pain control than he did in July.    ROS: 13 point review of systems is complete and is negative listed above in HPI    Past Medical History:   Diagnosis Date    Anemia     BPH (benign prostatic hyperplasia)     Depression     DVT (deep venous thrombosis) (CMS/[facility]) 2021    GERD (gastroesophageal reflux disease)     Hyperlipidemia     Hypertension     Neuropathy     OA (osteoarthritis)     Obese     RADHA on CPAP     Panic disorder     Preoperative clearance 10/19/2023    Cleared by Dr. Juan Antonio Harris 3 days prior to surgery (instructions in office note)    Pulmonary embolism on long-term anticoagulation therapy (CMS/[facility]) 2021    Eliquis    Unspecified cataract     Cataracts, both eyes    Varicose veins of both lower extremities      Social History     Tobacco Use    Smoking status: Never     Passive exposure: Never    Smokeless tobacco: Never   Vaping Use    Vaping Use: Never used   Substance Use Topics    Alcohol use: Not Currently    Drug use: Not Currently     Types: Marijuana     Comment: in college     Family History   Problem Relation Name Age of Onset    Emphysema Mother      COPD Mother      Lupus Mother      Coronary artery disease Father      Hyperlipidemia Father      Hypertension Father      Stroke Father      Atrial fibrillation Brother       Past Surgical History:   Procedure Laterality Date    ANKLE Right 12/2021    arthroscopy    ANKLE Right 2023    athroscopy    APPENDECTOMY  1970    COLONOSCOPY      GASTRIC BYPASS  2017    sleeve gastrectomy    KNEE Right 2018    arthroscopy    KNEE ARTHROPLASTY Left 2013    LUMBAR FUSION      l3-4    TONSILLECTOMY  1974       Imaging:  Patient has an MRI from July 2023 which shows some stenosis above his prior surgery due to epidural  lipomatosis, ligamentum flavum thickening, and spondylosis.  He has some varying degrees of degenerative changes otherwise but no high-grade central or foraminal stenosis.      Impression/Plan:  63-year-old male with a history of back and leg symptoms who presents for evaluation and referral by his spine surgeon to discuss epidural injections.  The patient has had rather extensive prior pain management in the past but none since his spine surgery which was done in January 2022.  He has improved significantly with physical therapy but still can have pain that may be as high as an 8 out of 10 at times.  He has done a full course of physical therapy and tried numerous medications.  He is not deemed to be a surgical candidate at this time and was referred for an injection.    -Would consider an injection above his prior surgery at L2 bilaterally.  Procedure, risk, benefits, alternatives reviewed with the patient.    -Encouraged him to keep up with physical therapy and core strengthening as this helped significantly.    -I do not think he will need any surgery but if he did not see relief and he was doing physical therapy, continued medication management, and not responding to epidural injections, then we could consider neuromodulation.  We briefly discussed this and may consider in the future if we do not see the relief he is looking for.

## 2024-02-12 ENCOUNTER — APPOINTMENT (OUTPATIENT)
Dept: PAIN MEDICINE | Facility: HOSPITAL | Age: 64
End: 2024-02-12
Payer: COMMERCIAL

## 2024-02-13 ENCOUNTER — APPOINTMENT (OUTPATIENT)
Dept: ORTHOPEDIC SURGERY | Facility: CLINIC | Age: 64
End: 2024-02-13
Payer: COMMERCIAL

## 2024-02-19 ENCOUNTER — HOSPITAL ENCOUNTER (OUTPATIENT)
Dept: RADIOLOGY | Facility: HOSPITAL | Age: 64
Discharge: HOME | End: 2024-02-19
Payer: COMMERCIAL

## 2024-02-19 VITALS
OXYGEN SATURATION: 96 % | BODY MASS INDEX: 37.93 KG/M2 | DIASTOLIC BLOOD PRESSURE: 68 MMHG | TEMPERATURE: 97.5 F | SYSTOLIC BLOOD PRESSURE: 129 MMHG | HEIGHT: 72 IN | HEART RATE: 77 BPM | RESPIRATION RATE: 16 BRPM | WEIGHT: 280 LBS

## 2024-02-19 DIAGNOSIS — M54.16 LUMBAR RADICULOPATHY: ICD-10-CM

## 2024-02-19 PROCEDURE — 77003 FLUOROGUIDE FOR SPINE INJECT: CPT

## 2024-02-19 PROCEDURE — 64483 NJX AA&/STRD TFRM EPI L/S 1: CPT | Performed by: ANESTHESIOLOGY

## 2024-02-19 PROCEDURE — 64483 NJX AA&/STRD TFRM EPI L/S 1: CPT | Mod: 50 | Performed by: ANESTHESIOLOGY

## 2024-02-19 PROCEDURE — 2500000004 HC RX 250 GENERAL PHARMACY W/ HCPCS (ALT 636 FOR OP/ED): Performed by: ANESTHESIOLOGY

## 2024-02-19 PROCEDURE — 2720000007 HC OR 272 NO HCPCS

## 2024-02-19 RX ORDER — MIDAZOLAM HYDROCHLORIDE 1 MG/ML
INJECTION INTRAMUSCULAR; INTRAVENOUS
Status: COMPLETED | OUTPATIENT
Start: 2024-02-19 | End: 2024-02-19

## 2024-02-19 RX ADMIN — MIDAZOLAM HYDROCHLORIDE 2 MG: 1 INJECTION INTRAMUSCULAR; INTRAVENOUS at 11:33

## 2024-02-19 ASSESSMENT — PAIN - FUNCTIONAL ASSESSMENT
PAIN_FUNCTIONAL_ASSESSMENT: 0-10

## 2024-02-19 ASSESSMENT — PAIN SCALES - GENERAL
PAINLEVEL_OUTOF10: 4
PAINLEVEL_OUTOF10: 4
PAINLEVEL_OUTOF10: 3
PAINLEVEL_OUTOF10: 4
PAINLEVEL_OUTOF10: 3

## 2024-02-19 NOTE — H&P
History Of Present Illness  Cj Vargas is a 63 y.o. male presenting with bilateral radiculopathy.     Past Medical History  Past Medical History:   Diagnosis Date    Anemia     BPH (benign prostatic hyperplasia)     Depression     DVT (deep venous thrombosis) (CMS/[facility]) 2021    GERD (gastroesophageal reflux disease)     Hyperlipidemia     Hypertension     Neuropathy     OA (osteoarthritis)     Obese     RADHA on CPAP     Panic disorder     Preoperative clearance 10/19/2023    Cleared by Dr. Juan Antonio Harris 3 days prior to surgery (instructions in office note)    Pulmonary embolism on long-term anticoagulation therapy (CMS/[facility]) 2021    Eliquis    Unspecified cataract     Cataracts, both eyes    Varicose veins of both lower extremities        Surgical History  Past Surgical History:   Procedure Laterality Date    ANKLE Right 12/2021    arthroscopy    ANKLE Right 2023    athroscopy    APPENDECTOMY  1970    COLONOSCOPY      GASTRIC BYPASS  2017    sleeve gastrectomy    KNEE Right 2018    arthroscopy    KNEE ARTHROPLASTY Left 2013    LUMBAR FUSION      l3-4    TONSILLECTOMY  1974        Social History  He reports that he has never smoked. He has never been exposed to tobacco smoke. He has never used smokeless tobacco. He reports that he does not currently use alcohol. He reports that he does not currently use drugs after having used the following drugs: Marijuana.    Family History  Family History   Problem Relation Name Age of Onset    Emphysema Mother      COPD Mother      Lupus Mother      Coronary artery disease Father      Hyperlipidemia Father      Hypertension Father      Stroke Father      Atrial fibrillation Brother          Allergies  Patient has no known allergies.    Review of Systems   13 point ROS done and negative except for the above.   Physical Exam     General: NAD, well nourished   Eyes: Non-icteric sclera, EOMI  Ears, Nose, Mouth, and Throat: External ears and nose appear to be without deformity or  rash. No lesions or masses noted. Hearing is grossly intact.   Neck: Trachea midline  Respiratory: Nonlabored breathing   Cardiovascular: No JVD  Skin: No rashes or open lesions/ulcers identified on skin.    Last Recorded Vitals  Blood pressure 160/81, pulse 91, temperature 36.6 °C (97.9 °F), resp. rate 18, height 1.829 m (6'), weight 127 kg (280 lb), SpO2 95 %.    Relevant Results           Assessment/Plan   Problem List Items Addressed This Visit    None  Visit Diagnoses         Codes    Lumbar radiculopathy     M54.16    Relevant Orders    FL pain management TC    Transforaminal            Past medical history for postlaminectomy syndrome, on Eliquis, plan for bilateral L2 lumbar transforaminal epidural steroid injections.    Risks, benefits, alternatives to the procedure were discussed in detail and patient was amenable to proceeding.    Humza Mo MD

## 2024-02-19 NOTE — PROCEDURES
Preoperative diagnosis:  Lumbar radiculitis  Postoperative diagnosis:  Lumbar radiculitis  Procedure: Bilateral L2 lumbar transforaminal epidural steroid injection under fluoroscopic guidance  Surgeon: Nadiya Poe  Assistant:  Fellow, Evan Mo  Anesthesia: Local   Complications: Apparently none    Clinical note: Cj is a 63-year-old male with a history of back and leg symptoms who presents today for the aforementioned procedure.  He has a history of prior back surgery and some adjacent level disease.    Procedure note: The patient was met in the preoperative holding area after risks benefits and alternatives to procedure were discussed with the patient, informed consent was obtained. Patient brought back to the procedure room and placed in the prone position on the fluoroscopy table. Area over the back was exposed, prepped, draped, in the usual sterile fashion.  Skin and subcutaneous tissues to the neuroforamen was anesthetized using 0.5% lidocaine.  22-gauge Sprotte needles were inserted in the skin and advanced into the foramen. Needle tip position was confirmed in AP oblique and lateral view.  Contrast was injected which showed appropriate epidural spread, no intravascular or intrathecal uptake. A total of 2 mL of 0.5% lidocaine mixed with 10 mg dexamethasone was injected in divided doses among the 2 needles. Needles removed, bandage applied, patient tolerated the procedure well with no immediate complications.

## 2024-02-27 ENCOUNTER — TELEPHONE (OUTPATIENT)
Dept: PRIMARY CARE | Facility: CLINIC | Age: 64
End: 2024-02-27
Payer: COMMERCIAL

## 2024-02-28 ENCOUNTER — OFFICE VISIT (OUTPATIENT)
Dept: PRIMARY CARE | Facility: CLINIC | Age: 64
End: 2024-02-28
Payer: COMMERCIAL

## 2024-02-28 VITALS
HEART RATE: 79 BPM | WEIGHT: 280 LBS | SYSTOLIC BLOOD PRESSURE: 126 MMHG | DIASTOLIC BLOOD PRESSURE: 82 MMHG | BODY MASS INDEX: 37.93 KG/M2 | HEIGHT: 72 IN

## 2024-02-28 DIAGNOSIS — K59.00 CONSTIPATION, UNSPECIFIED CONSTIPATION TYPE: Primary | ICD-10-CM

## 2024-02-28 PROCEDURE — 99213 OFFICE O/P EST LOW 20 MIN: CPT | Performed by: FAMILY MEDICINE

## 2024-02-28 PROCEDURE — 1036F TOBACCO NON-USER: CPT | Performed by: FAMILY MEDICINE

## 2024-02-28 PROCEDURE — 3079F DIAST BP 80-89 MM HG: CPT | Performed by: FAMILY MEDICINE

## 2024-02-28 PROCEDURE — 3074F SYST BP LT 130 MM HG: CPT | Performed by: FAMILY MEDICINE

## 2024-02-28 ASSESSMENT — ENCOUNTER SYMPTOMS
OCCASIONAL FEELINGS OF UNSTEADINESS: 0
LOSS OF SENSATION IN FEET: 0
DEPRESSION: 0

## 2024-02-28 NOTE — PROGRESS NOTES
Pt is here for fuv, concerns of constipation.         Chief Complaint:  No chief complaint on file.  History Of Present Illness:   Cj Vargas is a 63 y.o. male      Patient here is following up for constipation. Notes he's never been regular, but that recently it's been worse than it's ever been. Notes relevant recent surgical events that occurred during this time was L4-L5 fusion and right knee replacement. States he tolerated these surgeries well and does not have any residual pain and that his primary concern is the constipation. Symptoms began around mid-November and have significantly worsened particularly since mid-January. He has tried suppositories, enemas, saline and water. Notes these get a little out but won't completely clear him. As of today, patient states his last bowel movement was Monday February 26th with the assistance of an enema. Denies abdominal pain, nausea, vomiting, diarrhea, hematochezia. Patient states his last colonoscopy was roughly about three years ago and has not had any recent endoscopy. States he's tried a myriad of diets, including high fiber ones with heavy liquids, none of which have been able to facilitate bowel movements. Patient notes that ED has gotten worse during this period. Denies any postprandial pain.     Patient has had a history of blood clots, previously in RLE that had moved to lungs, this was September 2023, had been on chronic Eliquis for 5-6 years, but states when this episode occurred in September, he had severe COVID, was unable to keep pills, water, food down and that is when the clot in the lung occurred. Denies any chest pain, shortness of breath, palpitations, weakness, fatigue.           Review of Systems:     Negative  Constitutional:   - fever   - chills   - night sweats  - unexpected weight change  - changes in sleep      Cardiovascular:   - chest pain  - chest heaviness  - palpitations  - swelling in ankles     Musculoskeletal:   - diffuse muscle  and joint aches  - back pain     Respiratory:   - shortness of breath  - difficulty breathing  - cough  - wheezing     Neurological:   - loss of consciousness  - tremors  - dizzy spells  - numbness   - tingling     Gastrointestinal:   - abdominal pain  - nausea  - vomiting  - diarrhea  - bloody stools     Skin:   - Rash  - lumps or bumps  - worrisome moles         Last Recorded Vitals:  Vitals:    02/28/24 0903   BP: 126/82   Pulse: 79   Weight: 127 kg (280 lb)   Height: 1.829 m (6')        Past Medical History:  He has a past medical history of Anemia, BPH (benign prostatic hyperplasia), Depression, DVT (deep venous thrombosis) (CMS/[facility]) (2021), GERD (gastroesophageal reflux disease), Hyperlipidemia, Hypertension, Neuropathy, OA (osteoarthritis), Obese, RADHA on CPAP, Panic disorder, Preoperative clearance (10/19/2023), Pulmonary embolism on long-term anticoagulation therapy (CMS/[facility]) (2021), Unspecified cataract, and Varicose veins of both lower extremities.     Past Surgical History:  He has a past surgical history that includes Appendectomy (1970); Knee Arthroplasty (Left, 2013); Gastric bypass (2017); Tonsillectomy (1974); XR ankle (Right, 12/2021); XR knee (Right, 2018); XR ankle (Right, 2023); Colonoscopy; and Lumbar fusion.     Social History:  He reports that he has never smoked. He has never been exposed to tobacco smoke. He has never used smokeless tobacco. He reports that he does not currently use alcohol. He reports that he does not currently use drugs after having used the following drugs: Marijuana.     Family History:  Family History   Problem Relation Name Age of Onset    Emphysema Mother      COPD Mother      Lupus Mother      Coronary artery disease Father      Hyperlipidemia Father      Hypertension Father      Stroke Father      Atrial fibrillation Brother       Allergies:  Patient has no known allergies.     Outpatient Medications:  Current Outpatient Medications   Medication Instructions     apixaban (ELIQUIS) 5 mg, oral, 2 times daily    atorvastatin (Lipitor) 40 mg tablet     buPROPion (WELLBUTRIN) 100 mg, oral, 3 times daily    chlorhexidine (Hibiclens) 4 % external liquid Hibiclens 4 % External Liquid USE AS DIRECTED. Quantity: 1 Refills: 0 Ordered: 4-Jan-2022 Juan BLAIRMarianne Start : 4-Jan-2022 Active    cholecalciferol, vitamin D3, 250 mcg (10,000 unit) tablet Take 1 tablet (250 mcg) by mouth every other day.    cyanocobalamin, vitamin B-12, (Nascobal) 500 mcg/spray spray,non-aerosol nasal solution     diclofenac (Cataflam) 50 mg tablet     enoxaparin (Lovenox) 120 mg/0.8 mL syringe     gabapentin (NEURONTIN) 600 mg, oral, 3 times daily    irbesartan-hydrochlorothiazide (Avalide) 150-12.5 mg tablet 1 tablet, oral, Daily    multivitamin tablet 1 tablet, oral, Daily    omeprazole (PRILOSEC) 40 mg, oral, Daily, Do not crush or chew.    pregabalin (Lyrica) 100 mg capsule Take 1 capsule 3 times a day by oral route for 30 days.    QUEtiapine (SEROQUEL) 50 mg, oral, Nightly    rosuvastatin (CRESTOR) 10 mg, oral, Daily    tadalafil (CIALIS) 20 mg, oral, Daily PRN    tiZANidine (ZANAFLEX) 4 mg, oral, 3 times daily    traMADol (ULTRAM) 100 mg, oral, Every 6 hours PRN    vitamins U6-D9-Z2-B12-protease (B-Complex With B-12) 2.5 mg-2.5 mg- 5 mg-100 mcg tablet TAKE 1 TABLET DAILY.        Physical Exam:    GENERAL: Well developed, well nourished, alert and cooperative, and appears to be in no acute distress.  PSYCH: mood pleasant and appropriate  HEAD: normocephalic  ABD: Soft, nontender, nontdistended  CARDIAC: RRR.  No murmur. No carotid bruits. No appreciable JVD.  LUNGS: Good respiratory effort. Clear to auscultation b/l without rales, rhonchi, wheezing.  EXTREMITIES: Full ROM  SKIN: Skin normal color. no lesions or eruptions.        Last Labs:  CBC -  Lab Results   Component Value Date    WBC 5.6 11/14/2023    HGB 15.8 11/14/2023    HCT 47.1 11/14/2023    MCV 98 11/14/2023     11/14/2023         CMP -  Lab Results   Component Value Date    CALCIUM 9.4 11/14/2023    PHOS 2.5 03/29/2021    PROT 7.0 11/14/2023    ALBUMIN 4.5 11/14/2023    AST 42 (H) 11/14/2023    ALT 49 11/14/2023    ALKPHOS 59 11/14/2023    BILITOT 0.7 11/14/2023        LIPID PANEL -  Lab Results   Component Value Date    CHOL 154 06/12/2023    TRIG 188 (H) 06/12/2023    HDL 40.4 06/12/2023    CHHDL 3.8 06/12/2023    LDLF 76 06/12/2023    VLDL 38 06/12/2023    NHDL 117 07/26/2022           Lab Results   Component Value Date     (H) 03/26/2021    HGBA1C 6.0 (H) 11/14/2023          [place] pain management TC  These images are not reportable by radiology and will not be interpreted   by  Radiologists.         Assessment/Plan   Problem List Items Addressed This Visit    None  Visit Diagnoses         Codes    Constipation, unspecified constipation type    -  Primary K59.00          Constipation which is chronic in nature.  If you are having a bowel movement less than once per week and you are doing the over-the-counter and lifestyle changes that you dictated, we agreed on a gastrointestinal referral.    Linzess sample provided today.  Discussed potential risks and benefits.  This is a sample targeting irritable bowel syndrome constipation dominant symptoms.    Follow-up with your primary care physician routinely.        Chin Granados, DO

## 2024-03-06 ENCOUNTER — HOSPITAL ENCOUNTER (OUTPATIENT)
Dept: RADIOLOGY | Facility: CLINIC | Age: 64
Discharge: HOME | End: 2024-03-06
Payer: COMMERCIAL

## 2024-03-06 ENCOUNTER — LAB (OUTPATIENT)
Dept: LAB | Facility: LAB | Age: 64
End: 2024-03-06
Payer: COMMERCIAL

## 2024-03-06 ENCOUNTER — OFFICE VISIT (OUTPATIENT)
Dept: ORTHOPEDIC SURGERY | Facility: CLINIC | Age: 64
End: 2024-03-06
Payer: COMMERCIAL

## 2024-03-06 ENCOUNTER — OFFICE VISIT (OUTPATIENT)
Dept: GASTROENTEROLOGY | Facility: CLINIC | Age: 64
End: 2024-03-06
Payer: COMMERCIAL

## 2024-03-06 VITALS
WEIGHT: 264 LBS | DIASTOLIC BLOOD PRESSURE: 88 MMHG | HEIGHT: 72 IN | SYSTOLIC BLOOD PRESSURE: 135 MMHG | BODY MASS INDEX: 35.76 KG/M2 | TEMPERATURE: 97.9 F | HEART RATE: 65 BPM

## 2024-03-06 DIAGNOSIS — Z96.651 S/P TOTAL KNEE REPLACEMENT, RIGHT: ICD-10-CM

## 2024-03-06 DIAGNOSIS — K59.00 CONSTIPATION, UNSPECIFIED CONSTIPATION TYPE: ICD-10-CM

## 2024-03-06 LAB
25(OH)D3 SERPL-MCNC: 39 NG/ML (ref 30–100)
TSH SERPL-ACNC: 3.31 MIU/L (ref 0.44–3.98)

## 2024-03-06 PROCEDURE — 73562 X-RAY EXAM OF KNEE 3: CPT | Mod: RT

## 2024-03-06 PROCEDURE — 74018 RADEX ABDOMEN 1 VIEW: CPT | Performed by: RADIOLOGY

## 2024-03-06 PROCEDURE — 99204 OFFICE O/P NEW MOD 45 MIN: CPT | Performed by: NURSE PRACTITIONER

## 2024-03-06 PROCEDURE — 73562 X-RAY EXAM OF KNEE 3: CPT | Mod: RIGHT SIDE | Performed by: RADIOLOGY

## 2024-03-06 PROCEDURE — 1036F TOBACCO NON-USER: CPT | Performed by: NURSE PRACTITIONER

## 2024-03-06 PROCEDURE — 84443 ASSAY THYROID STIM HORMONE: CPT

## 2024-03-06 PROCEDURE — 99024 POSTOP FOLLOW-UP VISIT: CPT | Performed by: ORTHOPAEDIC SURGERY

## 2024-03-06 PROCEDURE — 3075F SYST BP GE 130 - 139MM HG: CPT | Performed by: NURSE PRACTITIONER

## 2024-03-06 PROCEDURE — 83516 IMMUNOASSAY NONANTIBODY: CPT

## 2024-03-06 PROCEDURE — 82306 VITAMIN D 25 HYDROXY: CPT

## 2024-03-06 PROCEDURE — 36415 COLL VENOUS BLD VENIPUNCTURE: CPT

## 2024-03-06 PROCEDURE — 74018 RADEX ABDOMEN 1 VIEW: CPT

## 2024-03-06 PROCEDURE — 1036F TOBACCO NON-USER: CPT | Performed by: ORTHOPAEDIC SURGERY

## 2024-03-06 PROCEDURE — 3079F DIAST BP 80-89 MM HG: CPT | Performed by: NURSE PRACTITIONER

## 2024-03-06 PROCEDURE — 99214 OFFICE O/P EST MOD 30 MIN: CPT | Performed by: NURSE PRACTITIONER

## 2024-03-06 ASSESSMENT — ENCOUNTER SYMPTOMS
ENDOCRINE NEGATIVE: 1
ALLERGIC/IMMUNOLOGIC NEGATIVE: 1
CONSTITUTIONAL NEGATIVE: 1
CARDIOVASCULAR NEGATIVE: 1
NEUROLOGICAL NEGATIVE: 1
CONSTIPATION: 1
RESPIRATORY NEGATIVE: 1
EYES NEGATIVE: 1
PSYCHIATRIC NEGATIVE: 1
MUSCULOSKELETAL NEGATIVE: 1
HEMATOLOGIC/LYMPHATIC NEGATIVE: 1

## 2024-03-06 ASSESSMENT — PAIN SCALES - GENERAL: PAINLEVEL: 0-NO PAIN

## 2024-03-06 ASSESSMENT — PAIN - FUNCTIONAL ASSESSMENT: PAIN_FUNCTIONAL_ASSESSMENT: NO/DENIES PAIN

## 2024-03-06 NOTE — PROGRESS NOTES
Very happy with his right knee.  Having more issues with his low back.  Done with therapy.    Exam: Full extension.  Flexes to 120.  Minimal swelling.  No crepitus.    I personally reviewed the following radiographic exams: Right knee shows well-fixed well aligned cemented total knee.    Assessment: Status post right total knee.    Plan: Discussed continuing therapy exercises for strengthening.  Discussed antibiotics for dental appointment which she has coming up soon.  Plan follow-up x-ray right knee 1 year postop.

## 2024-03-06 NOTE — PROGRESS NOTES
Subjective   Patient ID: Cj Vargas is a 63 y.o. male who presents for constipation.  HPI  63 year old male here for evaluation of constipation  Medical history includes a right total knee replacement in November 2023, bilateral radiculopathy, BPH, DVT, GERD, HPL, hypertension, neuropathy, OA, obesity, RADHA CPAP, panic disorder, PE on Eliquis  2/19/2020 colonoscopy with Dr. Tony Yang showed 1 hyperplastic polyp in the rectum and 1 tubular adenoma in the cecum, hemorrhoids.  Has been having constipation  ED worse  More back pain after his knee replacement  Was on prednisone for a bit and helped pain  Was having regular Bm's  Bm once a week or more  Using enema's to start BM  Linzess tried and had diarrhea  Was taking it every morning 30 min prior to breakfast  Miralax daily for 3 weeks and made stool softer but didn't help him go  Increased fiber in his diet  Water- 3-4 glasses  Coffee, 8-10 cups decaf  2 sodas  Had gastric sleeve  No hx of small bowel obs  Used to have Bm every 2-3 days  Walking the same  No taking opoids now  Bloated and gassy on the linzess  No blood with his stool      Review of Systems   Constitutional: Negative.    HENT: Negative.     Eyes: Negative.    Respiratory: Negative.     Cardiovascular: Negative.    Gastrointestinal:  Positive for constipation.   Endocrine: Negative.    Genitourinary: Negative.    Musculoskeletal: Negative.    Skin: Negative.    Allergic/Immunologic: Negative.    Neurological: Negative.    Hematological: Negative.    Psychiatric/Behavioral: Negative.         Objective   Physical Exam  Constitutional:       Appearance: Normal appearance.   HENT:      Head: Normocephalic.      Nose: Nose normal.      Mouth/Throat:      Mouth: Mucous membranes are moist.   Eyes:      Pupils: Pupils are equal, round, and reactive to light.   Cardiovascular:      Rate and Rhythm: Normal rate and regular rhythm.      Pulses: Normal pulses.      Heart sounds: Normal heart sounds.    Pulmonary:      Effort: Pulmonary effort is normal.      Breath sounds: Normal breath sounds.   Abdominal:      General: Bowel sounds are normal.      Palpations: Abdomen is soft.   Musculoskeletal:         General: Normal range of motion.      Cervical back: Normal range of motion.   Skin:     General: Skin is warm and dry.   Neurological:      Mental Status: He is alert.   Psychiatric:         Mood and Affect: Mood normal.         Assessment/Plan        Constipation- you have had an increase in symptoms- I would recommend using the linzess one hr prior to breakfast to see it works with out giving you diarrhea and cramping. You can continue the fiber daily. I will order labs for celiac, TSH, vitamin d and an abdominal x-ry to rule out other causes. If I do not see a cause for the changes in bowel habits I would recommend a repeat colonoscopy    I will call you with your results and determine follow up    BLAIR Devine 03/06/24 3:26 PM

## 2024-03-06 NOTE — PATIENT INSTRUCTIONS
Constipation- you have had an increase in symptoms- I would recommend using the linzess one hr prior to breakfast to see it works with out giving you diarrhea and cramping. You can continue the fiber daily. I will order labs for celiac, TSH, vitamin d and an abdominal x-ry to rule out other causes. If I do not see a cause for the changes in bowel habits I would recommend a repeat colonoscopy    I will call you with your results and determine follow up

## 2024-03-07 DIAGNOSIS — Z96.651 S/P TOTAL KNEE REPLACEMENT, RIGHT: ICD-10-CM

## 2024-03-07 LAB
GLIADIN PEPTIDE IGA SER IA-ACNC: <1 U/ML
TTG IGA SER IA-ACNC: <1 U/ML

## 2024-03-07 RX ORDER — AMOXICILLIN 500 MG/1
2000 TABLET, FILM COATED ORAL ONCE
Qty: 4 TABLET | Refills: 0 | Status: SHIPPED | OUTPATIENT
Start: 2024-03-07 | End: 2024-03-07

## 2024-03-08 LAB
GLIADIN PEPTIDE IGG SER IA-ACNC: 1.17 FLU (ref 0–4.99)
TTG IGG SER IA-ACNC: <0.82 FLU (ref 0–4.99)

## 2024-03-09 DIAGNOSIS — F41.8 DEPRESSION WITH ANXIETY: ICD-10-CM

## 2024-03-09 DIAGNOSIS — E78.5 DYSLIPIDEMIA: ICD-10-CM

## 2024-03-09 DIAGNOSIS — M62.838 MUSCLE SPASM: ICD-10-CM

## 2024-03-09 DIAGNOSIS — G62.9 NEUROPATHY: ICD-10-CM

## 2024-03-09 DIAGNOSIS — I82.431 ACUTE DEEP VEIN THROMBOSIS (DVT) OF POPLITEAL VEIN OF RIGHT LOWER EXTREMITY (MULTI): ICD-10-CM

## 2024-03-11 RX ORDER — GABAPENTIN 600 MG/1
600 TABLET ORAL 3 TIMES DAILY
Qty: 270 TABLET | Refills: 1 | Status: SHIPPED | OUTPATIENT
Start: 2024-03-11

## 2024-03-11 RX ORDER — BUPROPION HYDROCHLORIDE 100 MG/1
100 TABLET ORAL 3 TIMES DAILY
Qty: 270 TABLET | Refills: 1 | Status: SHIPPED | OUTPATIENT
Start: 2024-03-11

## 2024-03-11 RX ORDER — APIXABAN 5 MG/1
TABLET, FILM COATED ORAL
Qty: 180 TABLET | Refills: 1 | Status: SHIPPED | OUTPATIENT
Start: 2024-03-11

## 2024-03-11 RX ORDER — TIZANIDINE 4 MG/1
4 TABLET ORAL 3 TIMES DAILY
Qty: 270 TABLET | Refills: 0 | Status: SHIPPED | OUTPATIENT
Start: 2024-03-11 | End: 2024-05-31

## 2024-03-11 RX ORDER — ROSUVASTATIN CALCIUM 10 MG/1
10 TABLET, COATED ORAL DAILY
Qty: 90 TABLET | Refills: 1 | Status: SHIPPED | OUTPATIENT
Start: 2024-03-11

## 2024-03-12 ENCOUNTER — OFFICE VISIT (OUTPATIENT)
Dept: PAIN MEDICINE | Facility: HOSPITAL | Age: 64
End: 2024-03-12
Payer: COMMERCIAL

## 2024-03-12 DIAGNOSIS — M48.062 SPINAL STENOSIS OF LUMBAR REGION WITH NEUROGENIC CLAUDICATION: ICD-10-CM

## 2024-03-12 DIAGNOSIS — K59.09 CHRONIC CONSTIPATION: ICD-10-CM

## 2024-03-12 DIAGNOSIS — R19.4 CHANGE IN BOWEL HABIT: Primary | ICD-10-CM

## 2024-03-12 PROCEDURE — 99214 OFFICE O/P EST MOD 30 MIN: CPT | Performed by: ANESTHESIOLOGY

## 2024-03-12 PROCEDURE — 1036F TOBACCO NON-USER: CPT | Performed by: ANESTHESIOLOGY

## 2024-03-12 RX ORDER — POLYETHYLENE GLYCOL-3350 AND ELECTROLYTES 236; 6.74; 5.86; 2.97; 22.74 G/274.31G; G/274.31G; G/274.31G; G/274.31G; G/274.31G
4000 POWDER, FOR SOLUTION ORAL ONCE
Qty: 4000 ML | Refills: 0 | Status: SHIPPED | OUTPATIENT
Start: 2024-03-12 | End: 2024-03-12

## 2024-03-12 RX ORDER — LUBIPROSTONE 24 UG/1
24 CAPSULE ORAL
Qty: 60 CAPSULE | Refills: 11 | Status: SHIPPED | OUTPATIENT
Start: 2024-03-12 | End: 2025-03-12

## 2024-03-12 ASSESSMENT — PAIN SCALES - GENERAL: PAINLEVEL: 7

## 2024-03-12 NOTE — PROGRESS NOTES
Chief Complaint   Patient presents with    Back Pain     History Of Present Illness  Cj Vargas is a 63 y.o. male with a past medical history of postlaminectomy syndrome (history of a posterior lateral fusion/decompression with discectomy at L3-4 which was done in January 2022) presents to [facility] pain clinic for continued management of low back pain.  Patient most recently had a bilateral L2 transforaminal epidural steroid injection on 2/19/2024.  He states that he had more than 70% relief of his pain however it was only lasting 1 week.  He states he continues to have low back pain with radicular symptoms that go down the lateral and anterior aspect of his leg all the way down to his foot.  The symptoms are worse on his right and is worse with any type of activity.  He is very active in physical therapy and takes gabapentin 600 mg at nighttime.  He does not like increasing the gabapentin dose as he has neurocognitive side effects from it.  He is on Wellbutrin for depression, but states he has tried other neuromodulating medications that do not work for him.  MRI from 2023 showing postsurgical changes from fusion and decompression at L3-4.  Had degenerative changes without significant spinal canal stenosis throughout the lumbar spine.  He has mild to moderate neuroforaminal narrowing at L4-5 as well as L5-S1.  There is disc bulge at L2-L3 with ligamentum flavum thickening as well as mild to moderate spinal canal stenosis with mild left and right neuroforaminal stenosis.  Patient states the pain is severe however it is more to the weakness and disturbance of his gait that is more bothersome to him.  Of note patient has longstanding injection history from prior pain clinic in [place].  He states he has had multiple epidurals, transforaminal's as well as facet injections. The pain causes significant stress in the patient's life, specifically interferes with general activity, mood, walking ability, ability to perform  tasks at home and/or work.  Patient participates in physical therapy and continues to perform physician directed exercises at home. Denies any bowel or bladder incontinence, saddle anesthesia, worsening pain, weakness or falls.     Past Medical History  He has a past medical history of Anemia, BPH (benign prostatic hyperplasia), Depression, DVT (deep venous thrombosis) (CMS/[facility]) (2021), GERD (gastroesophageal reflux disease), Hyperlipidemia, Hypertension, Neuropathy, OA (osteoarthritis), Obese, RADHA on CPAP, Panic disorder, Preoperative clearance (10/19/2023), Pulmonary embolism on long-term anticoagulation therapy (CMS/[facility]) (2021), Unspecified cataract, and Varicose veins of both lower extremities.    Surgical History  He has a past surgical history that includes Appendectomy (1970); Knee Arthroplasty (Left, 2013); Gastric bypass (2017); Tonsillectomy (1974); XR ankle (Right, 12/2021); XR knee (Right, 2018); XR ankle (Right, 2023); Colonoscopy; and Lumbar fusion.     Social History  He reports that he has never smoked. He has never been exposed to tobacco smoke. He has never used smokeless tobacco. He reports that he does not currently use alcohol. He reports that he does not currently use drugs after having used the following drugs: Marijuana.    Family History  Family History   Problem Relation Name Age of Onset    Emphysema Mother      COPD Mother      Lupus Mother      Coronary artery disease Father      Hyperlipidemia Father      Hypertension Father      Stroke Father      Atrial fibrillation Brother          Allergies  Patient has no known allergies.    Review of Symptoms:   Constitutional: Negative for chills, diaphoresis or fever  HENT: Negative for neck swelling  Eyes:.  Negative for eye pain  Respiratory:.  Negative for cough, shortness of breath or wheezing    Cardiovascular:.  Negative for chest pain or palpitations  Gastrointestinal:.  Negative for abdominal pain, nausea and vomiting  Genitourinary:.   Negative for urgency  Musculoskeletal:  Positive for back pain. Positive for joint pain. Denies falls within the past 3 months.  Skin: Negative for wounds or itching   Neurological: Negative for dizziness, seizures, loss of consciousness and weakness  Endo/Heme/Allergies: Does not bruise/bleed easily  Psychiatric/Behavioral: Negative for depression. The patient does not appear anxious.       PHYSICAL EXAM  Vitals signs reviewed  Constitutional:       General: Not in acute distress     Appearance: Normal appearance. Not ill-appearing.  HENT:     Head: Normocephalic and atraumatic  Eyes:     Conjunctiva/sclera: Conjunctivae normal  Cardiovascular:     Rate and Rhythm: Normal rate and regular rhythm  Pulmonary:     Effort: No respiratory distress  Abdominal:     Palpations: Abdomen is soft  Musculoskeletal: BEJARANO  Skin:     General: Skin is warm and dry  Neurological:     General: No focal deficit present  Psychiatric:         Mood and Affect: Mood normal         Behavior: Behavior normal    Advanced Exam   Inspection: No gross deformities, surgical scar well-healed.  Palpation: Palpation over lumbar paraspinals, as well as right SI joint  ROM: Normal range of motion of the lumbar flexion extension  Motor: 4-5 strength lower extremities  Sensory: Mild diminished sensation over foot, no particular dermatome able to be distinguished  Reflexes: 2+ reflexes bilateral upper and lower extremities  Lumbar: Negative straight leg raising bilaterally, negative for facet loading  Sacral: Negative Vlad, negative Gaenslen's  Hip: Negative for pain with anterior, lateral, posterior palpation of hip joints, negative FADIR, negative for internal/external rotation of the hip, negative logroll     Last Recorded Vitals  There were no vitals taken for this visit.    Relevant Results  Current Outpatient Medications   Medication Instructions    buPROPion (WELLBUTRIN) 100 mg, oral, 3 times daily    cholecalciferol, vitamin D3, 250 mcg (10,000  unit) tablet Take 1 tablet (250 mcg) by mouth every other day.    cyanocobalamin, vitamin B-12, (Nascobal) 500 mcg/spray spray,non-aerosol nasal solution     Eliquis 5 mg tablet TAKE 1 TABLET TWICE A DAY. DO NOT START BEFORE        10/21/23.    gabapentin (NEURONTIN) 600 mg, oral, 3 times daily    irbesartan-hydrochlorothiazide (Avalide) 150-12.5 mg tablet 1 tablet, oral, Daily    multivitamin tablet 1 tablet, oral, Daily    omeprazole (PRILOSEC) 40 mg, oral, Daily, Do not crush or chew.    QUEtiapine (SEROQUEL) 50 mg, oral, Nightly    rosuvastatin (CRESTOR) 10 mg, oral, Daily    tadalafil (CIALIS) 20 mg, oral, Daily PRN    tiZANidine (ZANAFLEX) 4 mg, oral, 3 times daily         MR lumbar spine wo IV contrast 07/11/2023    Narrative  Interpreted By:  SUSANNE IBARRA MD  MRN: [id number]  Patient Name: MASHA FREITAS    STUDY:  MRI L-SPINE WO;  7/11/2023 10:30 am    INDICATION:  Lumbar stenosis / leg pain/weakness  M48.061: Lumbar spinal stenosis.    COMPARISON:  None.    ACCESSION NUMBER(S):  [id number]    ORDERING CLINICIAN:  ELIZABETH KELLER    TECHNIQUE:  Sagittal T1, T2, STIR, axial T1 and T2 weighted images of the lumbar  spine were acquired.    FINDINGS:  For counting purposes the last lumbarized vertebral body is labeled  L5.    There are postsurgical changes of posterolateral fusion,  decompression and discectomy at L3-L4.    Alignment, vertebral body heights and marrow signal pattern are  within normal limits. There is desiccated disc signal throughout the  lumbar spine without significant loss of disc height. There is an  intervertebral disc prosthesis at L3-L4. The conus terminates at  L1-L2 and is unremarkable.    Evaluation of the paraspinal soft tissues is unremarkable.    Evaluation by level:    T12-L1: Mild disc bulge and facet arthrosis. No significant spinal  canal or neural foraminal stenosis.    L1-L2: Mild disc bulge and facet arthrosis. No significant spinal  canal or neural foraminal  stenosis.    L2-L3: Disc bulge, facet arthrosis and ligamentum flavum thickening.  Mild-to-moderate spinal canal stenosis. Mild left and no significant  right neural foraminal stenosis.    L3-L4: No significant spinal canal stenosis. Within the limitation of  metallic susceptibility artifact no significant neural foraminal  stenosis.    L4-L5: Disc bulge, facet arthrosis and ligamentum flavum thickening.  No significant spinal canal stenosis. Mild-to-moderate right and mild  left neural foraminal stenosis.    L5-S1: Disc bulge and facet arthrosis. No significant spinal canal  stenosis. Mild-to-moderate left and mild right neural foraminal  stenosis.    Impression  Postsurgical changes of posterolateral fusion, decompression and  discectomy at L3-L4. There is no significant spinal canal or neural  foraminal stenosis at this level.    Degenerative changes without significant spinal canal stenosis  throughout the lumbar spine. Mild-to-moderate neural foraminal  narrowing at L4-L5 and L5-S1.      MR LUMBAR SPINE WO IV CONTRAST 06/28/2021    Narrative  MRN: [id number]  Patient Name: MASHA FREITAS    STUDY:  MRI L-SPINE WO;  6/28/2021 10:55 am    INDICATION:  BACK PAIN. RIGHT LEG PAIN AND WEAKNESS..    COMPARISON:  None.    ACCESSION NUMBER(S):  [id number]    ORDERING CLINICIAN:  ELIZABETH KELLER    TECHNIQUE:  Sagittal T1, T2, STIR, axial T1 and T2 weighted images of the lumbar  spine were acquired.    FINDINGS:  Alignment: There are 5 lumbar type vertebrae. L5 has 4 mm  retrolisthesis on S1. L3 has 3 mm anterolisthesis on L4.    The spinal canal is narrow on a congenital basis with AP diameters of  12-15 mm.    Vertebrae/Intervertebral Discs: The vertebral bodies demonstrate  expected height.The marrow signal is within normal limits. The discs  have mild degenerative desiccation.    Conus: The lower thoracic cord appears unremarkable. The conus  terminates at L1-2.    T11-12: The sagittal images demonstrate disc  protrusion indenting the  ventral cord with a small amount of CSF posterior to the cord at this  level.    T12-L1: Facet hypertrophy mildly indents the dorsal lateral aspect of  the thecal sac with no significant stenosis noted.    L1-2: No stenosis is noted.    L2-3: The lateral recesses are mildly stenosed by facet hypertrophy  and ligament thickening dorsally.    L3-4: The spinal canal and thecal sac are severely stenosed by  combination of disc bulge, facet hypertrophy and ligament thickening  as well as increased epidural fat especially posteriorly. The AP  thecal sac diameter is 7 mm. A 3 mm facet joint cyst contributes to  the right lateral recess stenosis as well. Lateral disc bulges abut  the exiting L4 nerves. The facet joints are severely arthritic worse  on the right.    L5-S1: The disc has focal annular fissure posteriorly just to the  left of midline. The facet joints are mildly arthritic. No  significant stenoses are noted.    The prevertebral and posterior paraspinous soft tissues are  unremarkable.    Impression  Multilevel degenerative changes are present most severe at L3-4. The  overall spinal canal is narrow on a congenital basis.     No image results found.       1. Spinal stenosis of lumbar region with neurogenic claudication  Transforaminal    [place] pain [facility]           ASSESSMENT/PLAN  Cj Vargas is a 63 y.o. male with a past medical history of postlaminectomy syndrome presents to pain clinic for continued management of low back pain.  Patient most recently had bilateral L2 transforaminal epidural steroid injection that provided 70% relief however was in limited duration.  Patient continues to have low back symptoms with radicular pain down the lateral and anterior aspects of both legs with the right being worse than the left.  He also reports subjective weakness in bilateral lower extremities.  MRI from 2023 showing postsurgical changes from fusion and decompression at L3-4.  Had  degenerative changes without significant spinal canal stenosis throughout the lumbar spine.  He has mild to moderate neuroforaminal narrowing at L4-5 as well as L5-S1.  There is disc bulge at L2-L3 with ligamentum flavum thickening as well as mild to moderate spinal canal stenosis.  At this point given relief with prior injection we will repeat L2 transforaminal with methylprednisolone.  If patient continues to have pain would recommend reconsultation with Dr. Morris.  If Dr. Morris is not planning any intervention patient may benefit from MILD procedure at L2-3 or spinal cord stimulation    Our plan is as follows:  - Repeat bilateral L2 transforaminal injection with Depo.  We discussed using a different medication to help with his pain.  Side effect profile, risk, benefits, alternatives reviewed.    - Continue to participate in physical therapy as well as physician directed home exercises.    - Continue pain medications as prescribed.    - I do think in the future he could be a candidate for intermediate procedures if he is not deemed to be a further surgical candidate.  Mild at the adjacent level versus spinal cord stimulation could be considered.  Information pamphlets and a brief discussion about these procedures took place during the appointment.       Delroy Vargas MD

## 2024-03-31 DIAGNOSIS — F41.8 DEPRESSION WITH ANXIETY: ICD-10-CM

## 2024-03-31 DIAGNOSIS — N52.9 ERECTILE DYSFUNCTION, UNSPECIFIED ERECTILE DYSFUNCTION TYPE: ICD-10-CM

## 2024-03-31 DIAGNOSIS — I10 BENIGN ESSENTIAL HYPERTENSION: ICD-10-CM

## 2024-04-01 ENCOUNTER — HOSPITAL ENCOUNTER (OUTPATIENT)
Dept: RADIOLOGY | Facility: HOSPITAL | Age: 64
Discharge: HOME | End: 2024-04-01
Payer: COMMERCIAL

## 2024-04-01 VITALS
RESPIRATION RATE: 16 BRPM | OXYGEN SATURATION: 95 % | HEIGHT: 72 IN | BODY MASS INDEX: 38.6 KG/M2 | SYSTOLIC BLOOD PRESSURE: 128 MMHG | TEMPERATURE: 97.6 F | WEIGHT: 285 LBS | HEART RATE: 78 BPM | DIASTOLIC BLOOD PRESSURE: 55 MMHG

## 2024-04-01 DIAGNOSIS — M48.062 SPINAL STENOSIS OF LUMBAR REGION WITH NEUROGENIC CLAUDICATION: ICD-10-CM

## 2024-04-01 PROCEDURE — 64483 NJX AA&/STRD TFRM EPI L/S 1: CPT | Mod: 50 | Performed by: ANESTHESIOLOGY

## 2024-04-01 PROCEDURE — 64483 NJX AA&/STRD TFRM EPI L/S 1: CPT | Performed by: ANESTHESIOLOGY

## 2024-04-01 PROCEDURE — 2500000004 HC RX 250 GENERAL PHARMACY W/ HCPCS (ALT 636 FOR OP/ED): Performed by: ANESTHESIOLOGY

## 2024-04-01 PROCEDURE — 2720000007 HC OR 272 NO HCPCS

## 2024-04-01 RX ORDER — MIDAZOLAM HYDROCHLORIDE 1 MG/ML
INJECTION INTRAMUSCULAR; INTRAVENOUS
Status: COMPLETED | OUTPATIENT
Start: 2024-04-01 | End: 2024-04-01

## 2024-04-01 RX ADMIN — MIDAZOLAM HYDROCHLORIDE 2 MG: 1 INJECTION INTRAMUSCULAR; INTRAVENOUS at 15:12

## 2024-04-01 ASSESSMENT — PAIN SCALES - GENERAL
PAINLEVEL_OUTOF10: 4
PAINLEVEL_OUTOF10: 5 - MODERATE PAIN
PAINLEVEL_OUTOF10: 4
PAINLEVEL_OUTOF10: 5 - MODERATE PAIN
PAINLEVEL_OUTOF10: 3

## 2024-04-01 ASSESSMENT — PAIN - FUNCTIONAL ASSESSMENT
PAIN_FUNCTIONAL_ASSESSMENT: 0-10
PAIN_FUNCTIONAL_ASSESSMENT: 0-10

## 2024-04-01 NOTE — PROCEDURES
Preoperative diagnosis:  Lumbar radiculitis  Postoperative diagnosis:  Lumbar radiculitis  Procedure: Bilateral L2 lumbar transforaminal epidural steroid injection under fluoroscopic guidance with methylprednisolone  Surgeon: Nadiya Poe  Assistant:  Fellow, Senthil Ernst  Anesthesia: Local   Complications: Apparently none    Clinical note: Cj is a 63-year-old male with history of back pain and some adjacent level disease above his prior surgery.  He had good response to a prior injection but short lasting, we discussed using a different medication called methylprednisolone which is a second line medication may give longer lasting relief.    Procedure note: The patient was met in the preoperative holding area after risks benefits and alternatives to procedure were discussed with the patient, informed consent was obtained. Patient brought back to the procedure room and placed in the prone position on the fluoroscopy table. Area over the back was exposed, prepped, draped, in the usual sterile fashion.  Skin and subcutaneous tissues to the neuroforamen was anesthetized using 0.5% lidocaine.  120 mm 22-gauge Sprotte needles were inserted in the skin and advanced into the foramen. Needle tip position was confirmed in AP oblique and lateral views.  The right-sided needle was advanced slightly to get optimal contrast spread.  In the final position contrast was injected bilaterally which showed appropriate epidural spread, no intravascular or intrathecal uptake. A total of 3 mL of 0.5% lidocaine mixed with 40 mg of methylprednisolone was injected in divided doses among the 2 needles. Needles removed, bandage applied, patient tolerated the procedure well with no immediate complications.

## 2024-04-01 NOTE — H&P
History Of Present Illness  Cj Vargas is a 63 y.o. male presents for procedure state below. Endorses no changes in past medical history or medical health since last seen in clinic.     Past Medical History  He has a past medical history of Anemia, BPH (benign prostatic hyperplasia), Depression, DVT (deep venous thrombosis) (CMS/[facility]) (2021), GERD (gastroesophageal reflux disease), Hyperlipidemia, Hypertension, Neuropathy, OA (osteoarthritis), Obese, RADHA on CPAP, Panic disorder, Preoperative clearance (10/19/2023), Pulmonary embolism on long-term anticoagulation therapy (CMS/[facility]) (2021), Unspecified cataract, and Varicose veins of both lower extremities.    Surgical History  He has a past surgical history that includes Appendectomy (1970); Knee Arthroplasty (Left, 2013); Gastric bypass (2017); Tonsillectomy (1974); XR ankle (Right, 12/2021); XR knee (Right, 2018); XR ankle (Right, 2023); Colonoscopy; and Lumbar fusion.     Social History  He reports that he has never smoked. He has never been exposed to tobacco smoke. He has never used smokeless tobacco. He reports that he does not currently use alcohol. He reports that he does not currently use drugs after having used the following drugs: Marijuana.    Family History  Family History   Problem Relation Name Age of Onset    Emphysema Mother      COPD Mother      Lupus Mother      Coronary artery disease Father      Hyperlipidemia Father      Hypertension Father      Stroke Father      Atrial fibrillation Brother          Allergies  Patient has no known allergies.    Review of Symptoms:   Constitutional: Negative for chills, diaphoresis or fever  HENT: Negative for neck swelling  Eyes:.  Negative for eye pain  Respiratory:.  Negative for cough, shortness of breath or wheezing    Cardiovascular:.  Negative for chest pain or palpitations  Gastrointestinal:.  Negative for abdominal pain, nausea and vomiting  Genitourinary:.  Negative for urgency  Musculoskeletal:   Positive for back pain. Positive for joint pain. Denies falls within the past 3 months.  Skin: Negative for wounds or itching   Neurological: Negative for dizziness, seizures, loss of consciousness and weakness  Endo/Heme/Allergies: Does not bruise/bleed easily  Psychiatric/Behavioral: Negative for depression. The patient does not appear anxious.       PHYSICAL EXAM  Vitals signs reviewed  Constitutional:       General: Not in acute distress     Appearance: Normal appearance. Not ill-appearing.  HENT:     Head: Normocephalic and atraumatic  Eyes:     Conjunctiva/sclera: Conjunctivae normal  Cardiovascular:     Rate and Rhythm: Normal rate and regular rhythm  Pulmonary:     Effort: No respiratory distress  Abdominal:     Palpations: Abdomen is soft  Musculoskeletal: BEJARANO  Skin:     General: Skin is warm and dry  Neurological:     General: No focal deficit present  Psychiatric:         Mood and Affect: Mood normal         Behavior: Behavior normal     Last Recorded Vitals  There were no vitals taken for this visit.    Relevant Results  Current Outpatient Medications   Medication Instructions    buPROPion (WELLBUTRIN) 100 mg, oral, 3 times daily    cholecalciferol, vitamin D3, 250 mcg (10,000 unit) tablet Take 1 tablet (250 mcg) by mouth every other day.    cyanocobalamin, vitamin B-12, (Nascobal) 500 mcg/spray spray,non-aerosol nasal solution     Eliquis 5 mg tablet TAKE 1 TABLET TWICE A DAY. DO NOT START BEFORE        10/21/23.    gabapentin (NEURONTIN) 600 mg, oral, 3 times daily    irbesartan-hydrochlorothiazide (Avalide) 150-12.5 mg tablet 1 tablet, oral, Daily    lubiprostone (AMITIZA) 24 mcg, oral, 2 times daily with meals, Take with meals    multivitamin tablet 1 tablet, oral, Daily    omeprazole (PRILOSEC) 40 mg, oral, Daily, Do not crush or chew.    QUEtiapine (SEROQUEL) 50 mg, oral, Nightly    rosuvastatin (CRESTOR) 10 mg, oral, Daily    tadalafil (CIALIS) 20 mg, oral, Daily PRN    tiZANidine (ZANAFLEX) 4 mg,  oral, 3 times daily         MR lumbar spine wo IV contrast 07/11/2023    Narrative  Interpreted By:  SUSANNE IBARRA MD  MRN: [id number]  Patient Name: MASHA FREITAS    STUDY:  MRI L-SPINE WO;  7/11/2023 10:30 am    INDICATION:  Lumbar stenosis / leg pain/weakness  M48.061: Lumbar spinal stenosis.    COMPARISON:  None.    ACCESSION NUMBER(S):  [id number]    ORDERING CLINICIAN:  ELIZABETH KELLER    TECHNIQUE:  Sagittal T1, T2, STIR, axial T1 and T2 weighted images of the lumbar  spine were acquired.    FINDINGS:  For counting purposes the last lumbarized vertebral body is labeled  L5.    There are postsurgical changes of posterolateral fusion,  decompression and discectomy at L3-L4.    Alignment, vertebral body heights and marrow signal pattern are  within normal limits. There is desiccated disc signal throughout the  lumbar spine without significant loss of disc height. There is an  intervertebral disc prosthesis at L3-L4. The conus terminates at  L1-L2 and is unremarkable.    Evaluation of the paraspinal soft tissues is unremarkable.    Evaluation by level:    T12-L1: Mild disc bulge and facet arthrosis. No significant spinal  canal or neural foraminal stenosis.    L1-L2: Mild disc bulge and facet arthrosis. No significant spinal  canal or neural foraminal stenosis.    L2-L3: Disc bulge, facet arthrosis and ligamentum flavum thickening.  Mild-to-moderate spinal canal stenosis. Mild left and no significant  right neural foraminal stenosis.    L3-L4: No significant spinal canal stenosis. Within the limitation of  metallic susceptibility artifact no significant neural foraminal  stenosis.    L4-L5: Disc bulge, facet arthrosis and ligamentum flavum thickening.  No significant spinal canal stenosis. Mild-to-moderate right and mild  left neural foraminal stenosis.    L5-S1: Disc bulge and facet arthrosis. No significant spinal canal  stenosis. Mild-to-moderate left and mild right neural  foraminal  stenosis.    Impression  Postsurgical changes of posterolateral fusion, decompression and  discectomy at L3-L4. There is no significant spinal canal or neural  foraminal stenosis at this level.    Degenerative changes without significant spinal canal stenosis  throughout the lumbar spine. Mild-to-moderate neural foraminal  narrowing at L4-L5 and L5-S1.     No image results found.       1. Spinal stenosis of lumbar region with neurogenic claudication  Transforaminal    Transforaminal    [facility]    [facility]           ASSESSMENT/PLAN  Cj Vargas is a 63 y.o. male presents for bilateral L2 transforaminal injection with Depo     Our plan is as follows:  - Follow In pain clinic  - Continue to participate in physical therapy as well as physician directed home exercises  - Continue pain medications as prescribed       Delroy Vargas MD

## 2024-04-02 RX ORDER — QUETIAPINE FUMARATE 50 MG/1
50 TABLET, FILM COATED ORAL NIGHTLY
Qty: 90 TABLET | Refills: 1 | Status: SHIPPED | OUTPATIENT
Start: 2024-04-02

## 2024-04-02 RX ORDER — IRBESARTAN AND HYDROCHLOROTHIAZIDE 150; 12.5 MG/1; MG/1
1 TABLET, FILM COATED ORAL DAILY
Qty: 90 TABLET | Refills: 1 | Status: SHIPPED | OUTPATIENT
Start: 2024-04-02

## 2024-04-02 RX ORDER — TADALAFIL 20 MG/1
TABLET ORAL
Qty: 18 TABLET | Refills: 1 | Status: SHIPPED | OUTPATIENT
Start: 2024-04-02

## 2024-04-09 ENCOUNTER — ANESTHESIA (OUTPATIENT)
Dept: GASTROENTEROLOGY | Facility: HOSPITAL | Age: 64
End: 2024-04-09
Payer: COMMERCIAL

## 2024-04-09 ENCOUNTER — HOSPITAL ENCOUNTER (OUTPATIENT)
Dept: GASTROENTEROLOGY | Facility: HOSPITAL | Age: 64
Setting detail: OUTPATIENT SURGERY
Discharge: HOME | End: 2024-04-09
Payer: COMMERCIAL

## 2024-04-09 ENCOUNTER — ANESTHESIA EVENT (OUTPATIENT)
Dept: GASTROENTEROLOGY | Facility: HOSPITAL | Age: 64
End: 2024-04-09
Payer: COMMERCIAL

## 2024-04-09 VITALS
HEIGHT: 72 IN | WEIGHT: 287.04 LBS | HEART RATE: 68 BPM | SYSTOLIC BLOOD PRESSURE: 124 MMHG | OXYGEN SATURATION: 100 % | TEMPERATURE: 98.1 F | DIASTOLIC BLOOD PRESSURE: 75 MMHG | BODY MASS INDEX: 38.88 KG/M2 | RESPIRATION RATE: 16 BRPM

## 2024-04-09 DIAGNOSIS — R19.4 CHANGE IN BOWEL HABIT: ICD-10-CM

## 2024-04-09 PROCEDURE — 45378 DIAGNOSTIC COLONOSCOPY: CPT | Performed by: INTERNAL MEDICINE

## 2024-04-09 PROCEDURE — A45378 PR COLONOSCOPY,DIAGNOSTIC: Performed by: ANESTHESIOLOGY

## 2024-04-09 PROCEDURE — 7100000009 HC PHASE TWO TIME - INITIAL BASE CHARGE

## 2024-04-09 PROCEDURE — 2500000004 HC RX 250 GENERAL PHARMACY W/ HCPCS (ALT 636 FOR OP/ED): Performed by: NURSE ANESTHETIST, CERTIFIED REGISTERED

## 2024-04-09 PROCEDURE — 3700000001 HC GENERAL ANESTHESIA TIME - INITIAL BASE CHARGE

## 2024-04-09 PROCEDURE — 2500000005 HC RX 250 GENERAL PHARMACY W/O HCPCS: Performed by: NURSE ANESTHETIST, CERTIFIED REGISTERED

## 2024-04-09 PROCEDURE — A45378 PR COLONOSCOPY,DIAGNOSTIC: Performed by: NURSE ANESTHETIST, CERTIFIED REGISTERED

## 2024-04-09 PROCEDURE — 3700000002 HC GENERAL ANESTHESIA TIME - EACH INCREMENTAL 1 MINUTE

## 2024-04-09 PROCEDURE — 2500000004 HC RX 250 GENERAL PHARMACY W/ HCPCS (ALT 636 FOR OP/ED): Performed by: INTERNAL MEDICINE

## 2024-04-09 PROCEDURE — 7100000010 HC PHASE TWO TIME - EACH INCREMENTAL 1 MINUTE

## 2024-04-09 RX ORDER — SODIUM CHLORIDE, SODIUM LACTATE, POTASSIUM CHLORIDE, CALCIUM CHLORIDE 600; 310; 30; 20 MG/100ML; MG/100ML; MG/100ML; MG/100ML
20 INJECTION, SOLUTION INTRAVENOUS CONTINUOUS
Status: DISCONTINUED | OUTPATIENT
Start: 2024-04-09 | End: 2024-04-10 | Stop reason: HOSPADM

## 2024-04-09 RX ORDER — LIDOCAINE HYDROCHLORIDE 20 MG/ML
INJECTION, SOLUTION INFILTRATION; PERINEURAL AS NEEDED
Status: DISCONTINUED | OUTPATIENT
Start: 2024-04-09 | End: 2024-04-09

## 2024-04-09 RX ORDER — PROPOFOL 10 MG/ML
INJECTION, EMULSION INTRAVENOUS AS NEEDED
Status: DISCONTINUED | OUTPATIENT
Start: 2024-04-09 | End: 2024-04-09

## 2024-04-09 RX ORDER — PROPOFOL 10 MG/ML
INJECTION, EMULSION INTRAVENOUS CONTINUOUS PRN
Status: DISCONTINUED | OUTPATIENT
Start: 2024-04-09 | End: 2024-04-09

## 2024-04-09 RX ADMIN — PROPOFOL 100 MG: 10 INJECTION, EMULSION INTRAVENOUS at 14:22

## 2024-04-09 RX ADMIN — SODIUM CHLORIDE, POTASSIUM CHLORIDE, SODIUM LACTATE AND CALCIUM CHLORIDE: 600; 310; 30; 20 INJECTION, SOLUTION INTRAVENOUS at 14:10

## 2024-04-09 RX ADMIN — PROPOFOL 125 MCG/KG/MIN: 10 INJECTION, EMULSION INTRAVENOUS at 14:22

## 2024-04-09 RX ADMIN — LIDOCAINE HYDROCHLORIDE 50 MG: 20 INJECTION, SOLUTION INFILTRATION; PERINEURAL at 14:22

## 2024-04-09 ASSESSMENT — PAIN - FUNCTIONAL ASSESSMENT
PAIN_FUNCTIONAL_ASSESSMENT: 0-10

## 2024-04-09 ASSESSMENT — PAIN SCALES - GENERAL
PAINLEVEL_OUTOF10: 0 - NO PAIN

## 2024-04-09 ASSESSMENT — COLUMBIA-SUICIDE SEVERITY RATING SCALE - C-SSRS
6. HAVE YOU EVER DONE ANYTHING, STARTED TO DO ANYTHING, OR PREPARED TO DO ANYTHING TO END YOUR LIFE?: NO
1. IN THE PAST MONTH, HAVE YOU WISHED YOU WERE DEAD OR WISHED YOU COULD GO TO SLEEP AND NOT WAKE UP?: NO
2. HAVE YOU ACTUALLY HAD ANY THOUGHTS OF KILLING YOURSELF?: NO

## 2024-04-09 NOTE — PERIOPERATIVE NURSING NOTE
1441: Phase 2 care begins  1450: Dr. Koo bedside speaking to pt and wife  1500: Discharge instructions reviewed with pt and wife, all questions answered at this time  1524: IV removed  1532: Pt transported to [facility]

## 2024-04-09 NOTE — DISCHARGE INSTRUCTIONS

## 2024-04-09 NOTE — ANESTHESIA PREPROCEDURE EVALUATION
Patient: Cj Vargas    Procedure Information       Date/Time: 04/09/24 1300    Scheduled providers: Marianne Koo MD; Grey Urena MD; Dulce Sykes RN; Don Raza MA    Procedure: COLONOSCOPY    Location: [facility]            Relevant Problems   Cardiac   (+) Benign essential hypertension   (+) Essential familial hyperlipidemia      Pulmonary   (+) Acute pulmonary embolism, unspecified pulmonary embolism type, unspecified whether acute cor pulmonale present (CMS/HCC)   (+) Obstructive sleep apnea syndrome in adult   (+) Pulmonary embolism (CMS/HCC)      Neuro   (+) Panic disorder   (+) Peripheral neuropathy      GI   (+) Acid reflux      /Renal   (+) BPH without urinary obstruction      Endocrine   (+) Morbid obesity (CMS/HCC)      Hematology   (+) Anemia      Musculoskeletal   (+) Lumbar spinal stenosis   (+) Osteoarthritis of both knees   (+) Osteoarthritis of right knee       Clinical information reviewed:   Tobacco  Allergies  Meds   Med Hx  Surg Hx   Fam Hx  Soc Hx         Past Medical History:   Diagnosis Date    Anemia     BPH (benign prostatic hyperplasia)     Depression     GERD (gastroesophageal reflux disease)     Hyperlipidemia     Hypertension     Low back pain     Neuropathy     Obese     RADHA on CPAP     Panic disorder     Pulmonary embolism on long-term anticoagulation therapy (CMS/HCC) 2021    Eliquis    Recurrent deep vein thrombosis (DVT) (CMS/[facility])     Most recently 10/2023      Past Surgical History:   Procedure Laterality Date    ANKLE Right 12/2021    arthroscopy    ANKLE Right 2023    athroscopy    APPENDECTOMY  1970    COLONOSCOPY      GASTRIC BYPASS  2017    sleeve gastrectomy    KNEE ARTHROSCOPY W/ DEBRIDEMENT Right 2018    LUMBAR FUSION      l3-4    TONSILLECTOMY  1974    TOTAL KNEE ARTHROPLASTY Right 11/28/2023    TOTAL KNEE ARTHROPLASTY Left 2013     Social History     Tobacco Use    Smoking status: Never     Passive exposure: Never    Smokeless  tobacco: Never   Vaping Use    Vaping Use: Never used   Substance Use Topics    Alcohol use: Not Currently    Drug use: Not Currently     Types: Marijuana     Comment: in college      Current Outpatient Medications   Medication Instructions    buPROPion (WELLBUTRIN) 100 mg, oral, 3 times daily    cholecalciferol, vitamin D3, 250 mcg (10,000 unit) tablet Take 1 tablet (250 mcg) by mouth every other day.    cyanocobalamin, vitamin B-12, (Nascobal) 500 mcg/spray spray,non-aerosol nasal solution     Eliquis 5 mg tablet TAKE 1 TABLET TWICE A DAY. DO NOT START BEFORE        10/21/23.    gabapentin (NEURONTIN) 600 mg, oral, 3 times daily    irbesartan-hydrochlorothiazide (Avalide) 150-12.5 mg tablet 1 tablet, oral, Daily    lubiprostone (AMITIZA) 24 mcg, oral, 2 times daily with meals, Take with meals    multivitamin tablet 1 tablet, oral, Daily    omeprazole (PRILOSEC) 40 mg, oral, Daily, Do not crush or chew.    QUEtiapine (SEROQUEL) 50 mg, oral, Nightly    rosuvastatin (CRESTOR) 10 mg, oral, Daily    tadalafil 20 mg tablet TAKE 1 TABLET ONCE DAILY ASNEEDED FOR ERECTILE        DYSFUNCTION (SEXUAL        ACTIVITY)    tiZANidine (ZANAFLEX) 4 mg, oral, 3 times daily      No Known Allergies     Chemistry    Lab Results   Component Value Date/Time     11/14/2023 1142    K 4.1 11/14/2023 1142     11/14/2023 1142    CO2 28 11/14/2023 1142    BUN 14 11/14/2023 1142    CREATININE 1.07 11/14/2023 1142    Lab Results   Component Value Date/Time    CALCIUM 9.4 11/14/2023 1142    ALKPHOS 59 11/14/2023 1142    AST 42 (H) 11/14/2023 1142    ALT 49 11/14/2023 1142    BILITOT 0.7 11/14/2023 1142          Lab Results   Component Value Date    HGBA1C 6.0 (H) 11/14/2023     Lab Results   Component Value Date/Time    WBC 5.6 11/14/2023 1142    HGB 15.8 11/14/2023 1142    HCT 47.1 11/14/2023 1142     11/14/2023 1142     Lab Results   Component Value Date/Time    PROTIME 14.7 (H) 11/14/2023 1142    INR 1.3 (H) 11/14/2023  "1142     No results found for: \"ABORH\"  No results found for this or any previous visit (from the past 4464 hour(s)).  No results found for this or any previous visit from the past 1095 days.   Echo 10/13/2023:  Left Ventricle: The left ventricular systolic function is normal. The left ventricular cavity size was not assessed. Left ventricular diastolic filling was indeterminate.  Left Atrium: The left atrium was not well visualized.  Right Ventricle: The right ventricle was not well visualized. Unable to determine right ventricular systolic function.  Right Atrium: The right atrium is normal in size.  Aortic Valve: The aortic valve was not well visualized. There is no evidence of aortic valve regurgitation. The peak instantaneous gradient of the aortic valve is 5.9 mmHg. The mean gradient of the aortic valve is 3.0 mmHg.  Mitral Valve: The mitral valve is normal in structure. There is no evidence of mitral valve regurgitation.  Tricuspid Valve: The tricuspid valve is structurally normal. No evidence of tricuspid regurgitation.  Pulmonic Valve: The pulmonic valve is structurally normal. There is no indication of pulmonic valve regurgitation.  Pericardium: There is no pericardial effusion noted.  Aorta: The aortic root was not well visualized.  Systemic Veins: The inferior vena cava size appears small.        CONCLUSIONS:   1. Left ventricular systolic function is normal.   2. Poorly visualized anatomical structures due to suboptimal image quality.    Visit Vitals  /69   Pulse 76   Temp 36.5 °C (97.7 °F) (Temporal)   Resp 15   Ht 1.829 m (6')   Wt 130 kg (287 lb 0.6 oz)   SpO2 95%   BMI 38.93 kg/m²   Smoking Status Never   BSA 2.57 m²     NPO/Void Status  Date of Last Liquid: 04/09/24  Time of Last Liquid: 0700  Date of Last Solid: 04/07/24  Time of Last Solid: 2000  Last Intake Type: Clear fluids; GI prep         Physical Exam    Airway  Mallampati: III  TM distance: >3 FB  Neck ROM: full     Cardiovascular "   Rhythm: regular  Rate: normal     Dental - normal exam     Pulmonary   Breath sounds clear to auscultation     Abdominal - normal exam              Anesthesia Plan    History of general anesthesia?: yes  History of complications of general anesthesia?: no    ASA 3     MAC   (With standard ASA monitoring.)  intravenous induction   Anesthetic plan and risks discussed with patient.    Plan discussed with CRNA and CAA.

## 2024-04-09 NOTE — H&P
History Of Present Illness  Cj Vargas is a 63 y.o. male presenting with constipation, referred for colonoscopy.     Past Medical History  Past Medical History:   Diagnosis Date    Anemia     BPH (benign prostatic hyperplasia)     Depression     GERD (gastroesophageal reflux disease)     Hyperlipidemia     Hypertension     Low back pain     Neuropathy     Obese     RADHA on CPAP     Panic disorder     Pulmonary embolism on long-term anticoagulation therapy (CMS/HCC) 2021    Eliquis    Recurrent deep vein thrombosis (DVT) (CMS/[facility])     Most recently 10/2023       Surgical History  Past Surgical History:   Procedure Laterality Date    ANKLE Right 12/2021    arthroscopy    ANKLE Right 2023    athroscopy    APPENDECTOMY  1970    COLONOSCOPY      GASTRIC BYPASS  2017    sleeve gastrectomy    KNEE ARTHROSCOPY W/ DEBRIDEMENT Right 2018    LUMBAR FUSION      l3-4    TONSILLECTOMY  1974    TOTAL KNEE ARTHROPLASTY Right 11/28/2023    TOTAL KNEE ARTHROPLASTY Left 2013        Social History  He reports that he has never smoked. He has never been exposed to tobacco smoke. He has never used smokeless tobacco. He reports that he does not currently use alcohol. He reports that he does not currently use drugs after having used the following drugs: Marijuana.    Family History  Family History   Problem Relation Name Age of Onset    Emphysema Mother      COPD Mother      Lupus Mother      Coronary artery disease Father      Hyperlipidemia Father      Hypertension Father      Stroke Father      Atrial fibrillation Brother          Allergies  Patient has no known allergies.    Review of Systems     Physical Exam     Last Recorded Vitals  Blood pressure 130/69, pulse 76, temperature 36.5 °C (97.7 °F), temperature source Temporal, resp. rate 15, height 1.829 m (6'), weight 130 kg (287 lb 0.6 oz), SpO2 95 %.    Relevant Results    Assessment/Plan   Proceed with colonoscopy. 5       I spent 5 minutes in the professional and overall care  of this patient.      Marianne Koo MD

## 2024-04-10 ENCOUNTER — OFFICE VISIT (OUTPATIENT)
Dept: PRIMARY CARE | Facility: CLINIC | Age: 64
End: 2024-04-10
Payer: COMMERCIAL

## 2024-04-10 VITALS
BODY MASS INDEX: 39.14 KG/M2 | HEIGHT: 72 IN | WEIGHT: 289 LBS | SYSTOLIC BLOOD PRESSURE: 128 MMHG | DIASTOLIC BLOOD PRESSURE: 76 MMHG | HEART RATE: 75 BPM

## 2024-04-10 DIAGNOSIS — L65.9 HAIR LOSS: ICD-10-CM

## 2024-04-10 DIAGNOSIS — E29.1 MALE HYPOGONADISM: ICD-10-CM

## 2024-04-10 DIAGNOSIS — R53.83 OTHER FATIGUE: Primary | ICD-10-CM

## 2024-04-10 LAB
ESTRADIOL SERPL-MCNC: 56 PG/ML
FSH SERPL-ACNC: <0.9 IU/L
T3FREE SERPL-MCNC: 3.3 PG/ML (ref 2.3–4.2)
T4 FREE SERPL-MCNC: 1.22 NG/DL (ref 0.78–1.48)
THYROPEROXIDASE AB SERPL-ACNC: 49 IU/ML
TSH SERPL-ACNC: 1.7 MIU/L (ref 0.44–3.98)

## 2024-04-10 PROCEDURE — 1036F TOBACCO NON-USER: CPT | Performed by: FAMILY MEDICINE

## 2024-04-10 PROCEDURE — 99214 OFFICE O/P EST MOD 30 MIN: CPT | Performed by: FAMILY MEDICINE

## 2024-04-10 PROCEDURE — 84402 ASSAY OF FREE TESTOSTERONE: CPT

## 2024-04-10 PROCEDURE — 86376 MICROSOMAL ANTIBODY EACH: CPT

## 2024-04-10 PROCEDURE — 84481 FREE ASSAY (FT-3): CPT

## 2024-04-10 PROCEDURE — 83001 ASSAY OF GONADOTROPIN (FSH): CPT

## 2024-04-10 PROCEDURE — 3074F SYST BP LT 130 MM HG: CPT | Performed by: FAMILY MEDICINE

## 2024-04-10 PROCEDURE — 84443 ASSAY THYROID STIM HORMONE: CPT

## 2024-04-10 PROCEDURE — 36415 COLL VENOUS BLD VENIPUNCTURE: CPT

## 2024-04-10 PROCEDURE — 84439 ASSAY OF FREE THYROXINE: CPT

## 2024-04-10 PROCEDURE — 3078F DIAST BP <80 MM HG: CPT | Performed by: FAMILY MEDICINE

## 2024-04-10 PROCEDURE — 82670 ASSAY OF TOTAL ESTRADIOL: CPT

## 2024-04-10 RX ORDER — POLYETHYLENE GLYCOL-3350 AND ELECTROLYTES 236; 6.74; 5.86; 2.97; 22.74 G/274.31G; G/274.31G; G/274.31G; G/274.31G; G/274.31G
POWDER, FOR SOLUTION ORAL
COMMUNITY
Start: 2024-03-12

## 2024-04-10 ASSESSMENT — PAIN SCALES - GENERAL: PAINLEVEL_OUTOF10: 0 - NO PAIN

## 2024-04-10 ASSESSMENT — ENCOUNTER SYMPTOMS
DEPRESSION: 0
LOSS OF SENSATION IN FEET: 0
OCCASIONAL FEELINGS OF UNSTEADINESS: 0

## 2024-04-10 NOTE — ANESTHESIA POSTPROCEDURE EVALUATION
Patient: Cj Vargas    Procedure Summary       Date: 04/09/24 Room / Location: [facility]    Anesthesia Start: 1410 Anesthesia Stop: 1445    Procedure: COLONOSCOPY Diagnosis: Change in bowel habit    Scheduled Providers: Marianne Koo MD; Grey Urena MD; Dulce Sykes RN; Don Raza MA Responsible Provider: Grey Urena MD    Anesthesia Type: MAC ASA Status: 3            Anesthesia Type: MAC    Vitals Value Taken Time   /75 04/09/24 1511   Temp 36.7 °C (98.1 °F) 04/09/24 1441   Pulse 68 04/09/24 1511   Resp 16 04/09/24 1511   SpO2 100 % 04/09/24 1511       Anesthesia Post Evaluation    Patient location during evaluation: PACU  Patient participation: complete - patient participated  Level of consciousness: awake and alert  Pain management: adequate  Airway patency: patent  Cardiovascular status: acceptable and hemodynamically stable  Respiratory status: acceptable, spontaneous ventilation and nonlabored ventilation  Hydration status: acceptable  Postoperative Nausea and Vomiting: none        There were no known notable events for this encounter.

## 2024-04-10 NOTE — PROGRESS NOTES
Pt is here for weight gain, hair loss, dry skin and anemia.         Chief Complaint:  No chief complaint on file.  History Of Present Illness:   Cj Vargas is a 63 y.o. male        Feb 2024 visit: Patient here is following up for constipation. Notes he's never been regular, but that recently it's been worse than it's ever been. Notes relevant recent surgical events that occurred during this time was L4-L5 fusion and right knee replacement. States he tolerated these surgeries well and does not have any residual pain and that his primary concern is the constipation. Symptoms began around mid-November and have significantly worsened particularly since mid-January. He has tried suppositories, enemas, saline and water. Notes these get a little out but won't completely clear him. As of today, patient states his last bowel movement was Monday February 26th with the assistance of an enema. Denies abdominal pain, nausea, vomiting, diarrhea, hematochezia. Patient states his last colonoscopy was roughly about three years ago and has not had any recent endoscopy. States he's tried a myriad of diets, including high fiber ones with heavy liquids, none of which have been able to facilitate bowel movements. Patient notes that ED has gotten worse during this period. Denies any postprandial pain.  Linzess sample provided at that visit.  April 2024 update: still constipated. Enemas barely work. Has discussed with GI.     Tired since the beginning of the year.   He states he gets enough sleep. He has a CPAP.  Mental fog.   PCP has him on wellbutrin.   Blood work- has an order from [facility]- where he goes for testosterone replacement.   Gaining weight.  Losing hair.   Erectile dysfunction is worse.   On testosterone replacement.        Patient has had a history of blood clots, previously in RLE that had moved to lungs, this was September 2023, had been on chronic Eliquis for 5-6 years, but states when this episode  occurred in September, he had severe COVID, was unable to keep pills, water, food down and that is when the clot in the lung occurred.   RADHA on CPAP.         Healthcare team:  - Dr. Ruiz PCP  - GI  - ortho  - pain medicine      Tobacco: never      Work: .       Colonoscopy: April 9, 2024.           Review of Systems:     Negative  Constitutional:   - fever   - chills   - night sweats  - unexpected weight change  - changes in sleep     Eyes:   - loss of vision  - double vision  - floaters     Ear/Nose/Throat/Mouth:   - sore throat  - hearing changes  - sinus congestion     Cardiovascular:   - chest pain  - chest heaviness  - palpitations  - swelling in ankles       Respiratory:   - shortness of breath  - difficulty breathing  - frequent cough  - wheezing     Neurological:   - loss of consciousness  - tremors  - dizzy spells       Gastrointestinal:   - abdominal pain  - nausea  - vomiting       Genitourinary:   - urinary incontinence  - increased urinary frequency  - painful urination  - blood in urine     Skin:   - Rash  - lumps or bumps  - worrisome moles        Hematologic/Lymphatic:   - swollen glands  - blood clotting problems  - easy bruising.     Psychological:   - feelings of depression  - feelings of anxiety             Positive  Psychological:   - feeling generally happy  - feeling safe at home            Last Recorded Vitals:  Vitals:    04/10/24 1100   BP: 128/76   Pulse: 75   Weight: 131 kg (289 lb)   Height: 1.829 m (6')        Past Medical History:  He has a past medical history of Anemia, BPH (benign prostatic hyperplasia), Depression, GERD (gastroesophageal reflux disease), Hyperlipidemia, Hypertension, Low back pain, Neuropathy, Obese, RADHA on CPAP, Panic disorder, Pulmonary embolism on long-term anticoagulation therapy (CMS/[facility]) (2021), and Recurrent deep vein thrombosis (DVT) (CMS/[facility]).     Past Surgical History:  He has a past surgical history that includes Appendectomy (1970);  Gastric bypass (2017); Tonsillectomy (1974); XR ankle (Right, 12/2021); XR ankle (Right, 2023); Colonoscopy; Lumbar fusion; Knee arthroscopy w/ debridement (Right, 2018); Total knee arthroplasty (Right, 11/28/2023); and Total knee arthroplasty (Left, 2013).     Social History:  He reports that he has never smoked. He has never been exposed to tobacco smoke. He has never used smokeless tobacco. He reports that he does not currently use alcohol. He reports that he does not currently use drugs after having used the following drugs: Marijuana.     Family History:  Family History   Problem Relation Name Age of Onset    Emphysema Mother      COPD Mother      Lupus Mother      Coronary artery disease Father      Hyperlipidemia Father      Hypertension Father      Stroke Father      Atrial fibrillation Brother       Allergies:  Patient has no known allergies.     Outpatient Medications:  Current Outpatient Medications   Medication Instructions    buPROPion (WELLBUTRIN) 100 mg, oral, 3 times daily    cholecalciferol, vitamin D3, 250 mcg (10,000 unit) tablet Take 1 tablet (250 mcg) by mouth every other day.    cyanocobalamin, vitamin B-12, (Nascobal) 500 mcg/spray spray,non-aerosol nasal solution     Eliquis 5 mg tablet TAKE 1 TABLET TWICE A DAY. DO NOT START BEFORE        10/21/23.    gabapentin (NEURONTIN) 600 mg, oral, 3 times daily    GaviLyte-G 236-22.74-6.74 -5.86 gram solution take 4 000ml by mouth for 1 dose    irbesartan-hydrochlorothiazide (Avalide) 150-12.5 mg tablet 1 tablet, oral, Daily    lubiprostone (AMITIZA) 24 mcg, oral, 2 times daily with meals, Take with meals    multivitamin tablet 1 tablet, oral, Daily    omeprazole (PRILOSEC) 40 mg, oral, Daily, Do not crush or chew.    QUEtiapine (SEROQUEL) 50 mg, oral, Nightly    rosuvastatin (CRESTOR) 10 mg, oral, Daily    tadalafil 20 mg tablet TAKE 1 TABLET ONCE DAILY ASNEEDED FOR ERECTILE        DYSFUNCTION (SEXUAL        ACTIVITY)    tiZANidine (ZANAFLEX) 4 mg,  oral, 3 times daily        Physical Exam:  GENERAL: Well developed, well nourished, alert and cooperative, and appears to be in no acute distress.     PSYCH: mood pleasant and appropriate     HEAD: normocephalic     EYES: PERRL, EOMI. vision is grossly intact.       NOSE:  Nares patent b/l.   No bleeding nasal polyps. No nasal discharge.     THROAT:    Oropharynx clear.  No inflammation, swelling, exudate, or lesions.        NECK: Neck supple, non-tender.   No masses, no thyromegaly.  No anterior cervical lymphadenopathy.     CARDIAC:   RRR.   No murmur.       LUNGS: Good respiratory effort.  Clear to auscultation b/l without rales, rhonchi, wheezing.     ABD: soft, nontender       GAIT: Normal          EXTREMITIES: All 4 extremities are warm and well perfused.          SKIN: Skin normal color  no lesions or eruptions.      Last Labs:  CBC -  Lab Results   Component Value Date    WBC 5.6 11/14/2023    HGB 15.8 11/14/2023    HCT 47.1 11/14/2023    MCV 98 11/14/2023     11/14/2023        CMP -  Lab Results   Component Value Date    CALCIUM 9.4 11/14/2023    PHOS 2.5 03/29/2021    PROT 7.0 11/14/2023    ALBUMIN 4.5 11/14/2023    AST 42 (H) 11/14/2023    ALT 49 11/14/2023    ALKPHOS 59 11/14/2023    BILITOT 0.7 11/14/2023        LIPID PANEL -  Lab Results   Component Value Date    CHOL 154 06/12/2023    TRIG 188 (H) 06/12/2023    HDL 40.4 06/12/2023    CHHDL 3.8 06/12/2023    LDLF 76 06/12/2023    VLDL 38 06/12/2023    NHDL 117 07/26/2022           Lab Results   Component Value Date     (H) 03/26/2021    HGBA1C 6.0 (H) 11/14/2023                 Assessment/Plan   Problem List Items Addressed This Visit             ICD-10-CM    Hair loss L65.9    Other fatigue - Primary R53.83      constipation- continue with GI recommendations.    Fatigue/weight gain/hair loss- seems like you've had a reasonable workup (sleep apnea, mood, testosterone, thyroid) but agreed on endocrinology referral and you will follow up  with Dr. Ruiz in a couple months.     No work note needed.     Pt requesting that I put in labs for his urologist at [facility].   - testosterone total and free  Estradiol  Tsh, t4, t3, TPO  [facility]  - Fax: [phone]         Chin Granados, DO

## 2024-04-15 LAB
TESTOSTERONE FREE (CHAN): 300.9 PG/ML (ref 35–155)
TESTOSTERONE,TOTAL,LC-MS/MS: 1535 NG/DL (ref 250–1100)

## 2024-05-08 ENCOUNTER — OFFICE VISIT (OUTPATIENT)
Dept: ORTHOPEDIC SURGERY | Facility: CLINIC | Age: 64
End: 2024-05-08
Payer: COMMERCIAL

## 2024-05-08 DIAGNOSIS — M54.50 LUMBAR SPINE PAIN: Primary | ICD-10-CM

## 2024-05-08 PROCEDURE — 99214 OFFICE O/P EST MOD 30 MIN: CPT | Performed by: ORTHOPAEDIC SURGERY

## 2024-05-08 ASSESSMENT — PAIN - FUNCTIONAL ASSESSMENT: PAIN_FUNCTIONAL_ASSESSMENT: 0-10

## 2024-05-08 ASSESSMENT — PAIN DESCRIPTION - DESCRIPTORS: DESCRIPTORS: ACHING

## 2024-05-08 ASSESSMENT — PAIN SCALES - GENERAL: PAINLEVEL_OUTOF10: 4

## 2024-05-08 NOTE — PROGRESS NOTES
Cj returns.  He continues to be symptomatic with right buttock and posterior thigh pain.    He has had 2 injections since I have seen him last.  Most recently last month he did have an injection in the lumbar spine that helped transiently.    Again, he has had a prior L3-4 decompression and fusion that he did do well with.  However he has developed recurrent lumbar radiculopathy that has now been present for over a year.    His general health has been stable.    Family, social, and medical histories are obtained and reviewed.    30-point, patient-recorded Review of Systems is personally obtained and reviewed. Inclusive is no history of weight loss, change in appetite, recent change in activity level, change in bowel or bladder habits, fevers, chills, malaise, or night pain.    On exam he has a slow deliberate gait.  Painless motion both hips.  His strength is intact in the lower extremities with the exception of mild weakness and right hip flexion, 4/5.      We reviewed his plain film and lumbar MRI.  He has developed adjacent level stenosis at L2-3 that is moderately severe.      Impression: Persistent and severe right lumbar radiculopathy in the setting of adjacent level stenosis at L2-3.  We discussed the nature of this at length including the risks and benefits and expectations of surgery.  Surgery in his case would be a revision posterior decompression and fusion.  It would require removal of segmental instrumentation and exploration of fusion.    He is going to consider things and he will let us know if he would like to proceed.    ** Dictated with voice recognition software and not immediately reviewed for errors in grammar and/or spelling **

## 2024-05-22 ENCOUNTER — HOSPITAL ENCOUNTER (OUTPATIENT)
Dept: RADIOLOGY | Facility: CLINIC | Age: 64
Discharge: HOME | End: 2024-05-22
Payer: COMMERCIAL

## 2024-05-22 PROCEDURE — A9575 INJ GADOTERATE MEGLUMI 0.1ML: HCPCS | Performed by: ORTHOPAEDIC SURGERY

## 2024-05-22 PROCEDURE — 72158 MRI LUMBAR SPINE W/O & W/DYE: CPT | Performed by: RADIOLOGY

## 2024-05-22 PROCEDURE — 2550000001 HC RX 255 CONTRASTS: Performed by: ORTHOPAEDIC SURGERY

## 2024-05-22 PROCEDURE — 72158 MRI LUMBAR SPINE W/O & W/DYE: CPT

## 2024-05-22 RX ORDER — GADOTERATE MEGLUMINE 376.9 MG/ML
25 INJECTION INTRAVENOUS
Status: COMPLETED | OUTPATIENT
Start: 2024-05-22 | End: 2024-05-22

## 2024-05-22 RX ADMIN — GADOTERATE MEGLUMINE 25 ML: 376.9 INJECTION INTRAVENOUS at 10:03

## 2024-05-31 DIAGNOSIS — M62.838 MUSCLE SPASM: ICD-10-CM

## 2024-05-31 RX ORDER — TIZANIDINE 4 MG/1
4 TABLET ORAL 3 TIMES DAILY
Qty: 270 TABLET | Refills: 0 | Status: SHIPPED | OUTPATIENT
Start: 2024-05-31

## 2024-06-12 ENCOUNTER — OFFICE VISIT (OUTPATIENT)
Dept: ORTHOPEDIC SURGERY | Facility: CLINIC | Age: 64
End: 2024-06-12
Payer: COMMERCIAL

## 2024-06-12 DIAGNOSIS — M48.061 DEGENERATIVE LUMBAR SPINAL STENOSIS: Primary | ICD-10-CM

## 2024-06-12 PROCEDURE — 99214 OFFICE O/P EST MOD 30 MIN: CPT | Performed by: ORTHOPAEDIC SURGERY

## 2024-06-19 ENCOUNTER — APPOINTMENT (OUTPATIENT)
Dept: ORTHOPEDIC SURGERY | Facility: CLINIC | Age: 64
End: 2024-06-19
Payer: COMMERCIAL

## 2024-06-19 DIAGNOSIS — G56.01 CARPAL TUNNEL SYNDROME OF RIGHT WRIST: ICD-10-CM

## 2024-06-19 DIAGNOSIS — R20.2 PARESTHESIA OF BOTH HANDS: ICD-10-CM

## 2024-06-19 PROCEDURE — 99213 OFFICE O/P EST LOW 20 MIN: CPT | Performed by: ORTHOPAEDIC SURGERY

## 2024-06-19 NOTE — LETTER
July 13, 2024     Damion Ruiz MD  [address] [place] [address] Rd  [address]  [facility] [address]    Patient: Cj Vargas   YOB: 1960   Date of Visit: 6/19/2024       Dear Dr. Damion Ruiz MD:    Thank you for referring Cj Vargas to me for evaluation. Below are my notes for this consultation.  If you have questions, please do not hesitate to call me. I look forward to following your patient along with you.       Sincerely,     Devendra Hope MD      CC: No Recipients  ______________________________________________________________________________________    CHIEF COMPLAINT         Bilateral hand tingling    ASSESSMENT + PLAN    Bilateral hand paresthesias      We had a long discussion about the nature of the symptoms.  There are features, including the location and character of the symptoms, that point to the carpal tunnel as a likely site of nerve compression.  There are also features including daytime being worse than night that point away from that diagnosis.  In order to better identify the site of any nerve compression and guide future treatment, we agreed on obtaining an EMG on the more symptomatic right side.  Use a night splint in the meantime to help decrease any portion of this that is from carpal tunnel.    Follow-up once the study is complete.        HISTORY OF PRESENT ILLNESS       Patient is a 63 y.o. right-hand dominant male , who presents today for evaluation of tingling into the fingers of both hands.  This occurs primarily at work and when up during the day.  It began around New Year's without a single specific recalled trauma.  Using the mouse is particularly bothersome.  It improves with rest and ice.  He has tried Voltaren gel with a little improvement.  He cannot take systemic NSAIDs as he is on Eliquis.  He does have history of lumbar nerve compression followed by Dr. Morris.  Symptoms at night are minor and do not wake him from sleep.  He does occasionally shake the  hand during the day.  Right side is more bothersome.  He can have shooting pain into the dorsum of both hands as well.    He is not diabetic or hypothyroid.  He does not smoke.      REVIEW OF SYSTEMS       A 30-item multi-system Review Of Systems was obtained on today's intake form.  This was reviewed with the patient and is correct.  The pertinent positives and negatives are listed above.  The form has been scanned separately into the medical record.      PHYSICAL EXAM    Constitutional:    Appears stated age. Well-developed and well-nourished obese male in no acute distress.  Psychiatric:         Pleasant normal mood and affect. Behavior is appropriate for the situation.   Head:                   Normocephalic and atraumatic.  Eyes:                    Pupils are equal and round.  Cardiovascular:  2+ radial and ulnar pulses. Fingers well-perfused.  Respiratory:        Effort normal. No respiratory distress. Speaking in complete sentences.  Neurologic:       Alert and oriented to person, place, and time.  Skin:                Skin is intact, warm and dry.  Hematologic / Lymphatic:    No lymphedema or lymphangitis.    Extremities / Musculoskeletal:                      Skin of both hands and wrists is intact with no erythema, ecchymosis, or generalized swelling.  Normal skin drag and coloration.  Full composite finger flexion extension with good intrinsic plus minus posture.  Symmetric wrist and forearm motion.  Negative Warren.  Negative midcarpal shift.  5/5 APB and hand intrinsics with no wasting.  Equivocal Durkan, more clear on the right.  Negative Tinel and Phalen bilaterally.  Negative elbow flexion test.  No Tinel at the cubital tunnel.  Cervical range of motion is a little limited in both rotation and lateral bend but does not clearly cause any symptoms in either hand.  Sensation intact to light touch in all distributions.  Capillary refill less than 2 seconds.      IMAGING / LABS / EMGs           None  pertinent      Past Medical History:   Diagnosis Date   • Anemia    • BPH (benign prostatic hyperplasia)    • Depression    • GERD (gastroesophageal reflux disease)    • Hyperlipidemia    • Hypertension    • Low back pain    • Neuropathy    • Obese    • RADHA on CPAP    • Panic disorder    • Pulmonary embolism on long-term anticoagulation therapy (Multi) 2021    Eliquis   • Recurrent deep vein thrombosis (DVT) (Multi)     Most recently 10/2023       Medication Documentation Review Audit       Reviewed by Devendra Hope MD (Physician) on 07/13/24 at 1044      Medication Order Taking? Sig Documenting Provider Last Dose Status   buPROPion (Wellbutrin) 100 mg tablet [id number] No TAKE 1 TABLET 3 TIMES A DAY KINGA Mann-CNP Past Week Active   cholecalciferol, vitamin D3, 250 mcg (10,000 unit) tablet [id number] No Take 1 tablet (250 mcg) by mouth every other day. Historical Provider, MD Past Week Active   cyanocobalamin, vitamin B-12, (Nascobal) 500 mcg/spray spray,non-aerosol nasal solution [id number] No  Historical Provider, MD Past Week Active   Eliquis 5 mg tablet [id number] No TAKE 1 TABLET TWICE A DAY. DO NOT START BEFORE        10/21/23. KINGA Mann-CNP Past Week Active   gabapentin (Neurontin) 600 mg tablet [id number] No TAKE 1 TABLET 3 TIMES A DAY KINGA Mann-CNP Past Week Active   GaviLyte-G 236-22.74-6.74 -5.86 gram solution [id number]  take 4 000ml by mouth for 1 dose Historical Provider, MD  Active   irbesartan-hydrochlorothiazide (Avalide) 150-12.5 mg tablet [id number] No TAKE 1 TABLET ONCE DAILY KINGA Mann-CNP 4/9/2024 Active   lubiprostone (Amitiza) 24 mcg capsule [id number] No Take 1 capsule (24 mcg) by mouth 2 times a day with meals. Take with meals   Patient not taking: Reported on 4/9/2024    KINGA Devine-CNP Not Taking Active   multivitamin tablet [id number] No Take 1 tablet by mouth once daily. Historical Provider, MD Past Week Active   omeprazole (PriLOSEC)  40 mg DR capsule [id number] No Take 1 capsule (40 mg) by mouth once daily. Do not crush or chew. Historical Provider, MD 4/8/2024 Active   QUEtiapine (SEROquel) 50 mg tablet [id number] No TAKE 1 TABLET ONCE DAILY ATBEDTIME Tania Aburto APRN-CNP 4/8/2024 Active   rosuvastatin (Crestor) 10 mg tablet [id number] No TAKE 1 TABLET ONCE DAILY Tania Aburto APRN-CNP 4/8/2024 Active   tadalafil 20 mg tablet [id number] No TAKE 1 TABLET ONCE DAILY ASNEEDED FOR ERECTILE        DYSFUNCTION (SEXUAL        ACTIVITY) Tania Aburto APRN-CNP Past Week Active   tiZANidine (Zanaflex) 4 mg tablet [id number]  TAKE 1 TABLET 3 TIMES A DAY Damion Ruiz MD  Active                    No Known Allergies    Social History     Socioeconomic History   • Marital status:      Spouse name: Not on file   • Number of children: Not on file   • Years of education: Not on file   • Highest education level: Not on file   Occupational History   • Not on file   Tobacco Use   • Smoking status: Never     Passive exposure: Never   • Smokeless tobacco: Never   Vaping Use   • Vaping status: Never Used   Substance and Sexual Activity   • Alcohol use: Not Currently   • Drug use: Not Currently     Types: Marijuana     Comment: in college   • Sexual activity: Defer   Other Topics Concern   • Not on file   Social History Narrative   • Not on file     Social Determinants of Health     Financial Resource Strain: Low Risk  (11/25/2023)    Overall Financial Resource Strain (CARDIA)    • Difficulty of Paying Living Expenses: Not hard at all   Food Insecurity: No Food Insecurity (11/25/2023)    Hunger Vital Sign    • Worried About Running Out of Food in the Last Year: Never true    • Ran Out of Food in the Last Year: Never true   Transportation Needs: No Transportation Needs (12/29/2023)    OASIS : Transportation    • Lack of Transportation (Medical): No    • Lack of Transportation (Non-Medical): No    • Patient Unable or Declines to Respond: No    Physical Activity: Inactive (10/12/2023)    Exercise Vital Sign    • Days of Exercise per Week: 0 days    • Minutes of Exercise per Session: 0 min   Stress: Stress Concern Present (10/12/2023)    [employer] [place] [employer] - Occupational Stress Questionnaire    • Feeling of Stress : To some extent   Social Connections: Feeling Socially Integrated (12/29/2023)    OASIS : Social Isolation    • Frequency of experiencing loneliness or isolation: Never   Recent Concern: Social Connections - Moderately Isolated (11/25/2023)    Social Connection and Isolation Panel [NHANES]    • Frequency of Communication with Friends and Family: More than three times a week    • Frequency of Social Gatherings with Friends and Family: Three times a week    • Attends Yazidism Services: Never    • Active Member of Clubs or Organizations: No    • Attends Club or Organization Meetings: Never    • Marital Status:    Intimate Partner Violence: Not At Risk (11/25/2023)    Humiliation, Afraid, Rape, and Kick questionnaire    • Fear of Current or Ex-Partner: No    • Emotionally Abused: No    • Physically Abused: No    • Sexually Abused: No   Housing Stability: Low Risk  (11/25/2023)    Housing Stability Vital Sign    • Unable to Pay for Housing in the Last Year: No    • Number of Places Lived in the Last Year: 1    • Unstable Housing in the Last Year: No       Past Surgical History:   Procedure Laterality Date   • ANKLE Right 12/2021    arthroscopy   • ANKLE Right 2023    athroscopy   • APPENDECTOMY  1970   • COLONOSCOPY     • GASTRIC BYPASS  2017    sleeve gastrectomy   • KNEE ARTHROSCOPY W/ DEBRIDEMENT Right 2018   • LUMBAR FUSION      l3-4   • TONSILLECTOMY  1974   • TOTAL KNEE ARTHROPLASTY Right 11/28/2023   • TOTAL KNEE ARTHROPLASTY Left 2013         Electronically Signed      IVIS Hope MD      Orthopaedic Hand Surgery      [phone]

## 2024-07-01 ENCOUNTER — APPOINTMENT (OUTPATIENT)
Dept: PRIMARY CARE | Facility: CLINIC | Age: 64
End: 2024-07-01
Payer: COMMERCIAL

## 2024-07-09 ENCOUNTER — APPOINTMENT (OUTPATIENT)
Dept: ORTHOPEDIC SURGERY | Facility: CLINIC | Age: 64
End: 2024-07-09
Payer: COMMERCIAL

## 2024-07-11 ENCOUNTER — HOSPITAL ENCOUNTER (OUTPATIENT)
Dept: NEUROLOGY | Facility: CLINIC | Age: 64
Discharge: HOME | End: 2024-07-11
Payer: COMMERCIAL

## 2024-07-11 DIAGNOSIS — G56.01 CARPAL TUNNEL SYNDROME OF RIGHT WRIST: ICD-10-CM

## 2024-07-11 PROCEDURE — 95911 NRV CNDJ TEST 9-10 STUDIES: CPT | Performed by: PSYCHIATRY & NEUROLOGY

## 2024-07-11 PROCEDURE — 95886 MUSC TEST DONE W/N TEST COMP: CPT | Performed by: PSYCHIATRY & NEUROLOGY

## 2024-07-13 PROBLEM — R20.2 PARESTHESIA OF BOTH HANDS: Status: ACTIVE | Noted: 2024-07-13

## 2024-07-13 NOTE — PROGRESS NOTES
CHIEF COMPLAINT         Bilateral hand tingling    ASSESSMENT + PLAN    Bilateral hand paresthesias      We had a long discussion about the nature of the symptoms.  There are features, including the location and character of the symptoms, that point to the carpal tunnel as a likely site of nerve compression.  There are also features including daytime being worse than night that point away from that diagnosis.  In order to better identify the site of any nerve compression and guide future treatment, we agreed on obtaining an EMG on the more symptomatic right side.  Use a night splint in the meantime to help decrease any portion of this that is from carpal tunnel.    Follow-up once the study is complete.        HISTORY OF PRESENT ILLNESS       Patient is a 63 y.o. right-hand dominant male , who presents today for evaluation of tingling into the fingers of both hands.  This occurs primarily at work and when up during the day.  It began around New Year's without a single specific recalled trauma.  Using the mouse is particularly bothersome.  It improves with rest and ice.  He has tried Voltaren gel with a little improvement.  He cannot take systemic NSAIDs as he is on Eliquis.  He does have history of lumbar nerve compression followed by Dr. Morris.  Symptoms at night are minor and do not wake him from sleep.  He does occasionally shake the hand during the day.  Right side is more bothersome.  He can have shooting pain into the dorsum of both hands as well.    He is not diabetic or hypothyroid.  He does not smoke.      REVIEW OF SYSTEMS       A 30-item multi-system Review Of Systems was obtained on today's intake form.  This was reviewed with the patient and is correct.  The pertinent positives and negatives are listed above.  The form has been scanned separately into the medical record.      PHYSICAL EXAM    Constitutional:    Appears stated age. Well-developed and well-nourished obese male in no acute  distress.  Psychiatric:         Pleasant normal mood and affect. Behavior is appropriate for the situation.   Head:                   Normocephalic and atraumatic.  Eyes:                    Pupils are equal and round.  Cardiovascular:  2+ radial and ulnar pulses. Fingers well-perfused.  Respiratory:        Effort normal. No respiratory distress. Speaking in complete sentences.  Neurologic:       Alert and oriented to person, place, and time.  Skin:                Skin is intact, warm and dry.  Hematologic / Lymphatic:    No lymphedema or lymphangitis.    Extremities / Musculoskeletal:                      Skin of both hands and wrists is intact with no erythema, ecchymosis, or generalized swelling.  Normal skin drag and coloration.  Full composite finger flexion extension with good intrinsic plus minus posture.  Symmetric wrist and forearm motion.  Negative Warren.  Negative midcarpal shift.  5/5 APB and hand intrinsics with no wasting.  Equivocal Durkan, more clear on the right.  Negative Tinel and Phalen bilaterally.  Negative elbow flexion test.  No Tinel at the cubital tunnel.  Cervical range of motion is a little limited in both rotation and lateral bend but does not clearly cause any symptoms in either hand.  Sensation intact to light touch in all distributions.  Capillary refill less than 2 seconds.      IMAGING / LABS / EMGs           None pertinent      Past Medical History:   Diagnosis Date    Anemia     BPH (benign prostatic hyperplasia)     Depression     GERD (gastroesophageal reflux disease)     Hyperlipidemia     Hypertension     Low back pain     Neuropathy     Obese     RADHA on CPAP     Panic disorder     Pulmonary embolism on long-term anticoagulation therapy (Multi) 2021    Eliquis    Recurrent deep vein thrombosis (DVT) (Multi)     Most recently 10/2023       Medication Documentation Review Audit       Reviewed by Devendra Hope MD (Physician) on 07/13/24 at 1044      Medication Order Taking?  Sig Documenting Provider Last Dose Status   buPROPion (Wellbutrin) 100 mg tablet [id number] No TAKE 1 TABLET 3 TIMES A DAY BLAIR Mann Past Week Active   cholecalciferol, vitamin D3, 250 mcg (10,000 unit) tablet [id number] No Take 1 tablet (250 mcg) by mouth every other day. Historical Provider, MD Past Week Active   cyanocobalamin, vitamin B-12, (Nascobal) 500 mcg/spray spray,non-aerosol nasal solution [id number] No  Historical Provider, MD Past Week Active   Eliquis 5 mg tablet [id number] No TAKE 1 TABLET TWICE A DAY. DO NOT START BEFORE        10/21/23. KINGA Mann-CNP Past Week Active   gabapentin (Neurontin) 600 mg tablet [id number] No TAKE 1 TABLET 3 TIMES A DAY BLAIR Mann Past Week Active   GaviLyte-G 236-22.74-6.74 -5.86 gram solution [id number]  take 4 000ml by mouth for 1 dose Historical Provider, MD  Active   irbesartan-hydrochlorothiazide (Avalide) 150-12.5 mg tablet [id number] No TAKE 1 TABLET ONCE DAILY BLAIR Mann 4/9/2024 Active   lubiprostone (Amitiza) 24 mcg capsule [id number] No Take 1 capsule (24 mcg) by mouth 2 times a day with meals. Take with meals   Patient not taking: Reported on 4/9/2024    KINGA Devine-CNP Not Taking Active   multivitamin tablet [id number] No Take 1 tablet by mouth once daily. Historical Provider, MD Past Week Active   omeprazole (PriLOSEC) 40 mg DR capsule [id number] No Take 1 capsule (40 mg) by mouth once daily. Do not crush or chew. Historical Provider, MD 4/8/2024 Active   QUEtiapine (SEROquel) 50 mg tablet [id number] No TAKE 1 TABLET ONCE DAILY ATBEDTIME BLAIR Mann 4/8/2024 Active   rosuvastatin (Crestor) 10 mg tablet [id number] No TAKE 1 TABLET ONCE DAILY BLAIR Mann 4/8/2024 Active   tadalafil 20 mg tablet [id number] No TAKE 1 TABLET ONCE DAILY ASNEEDED FOR ERECTILE        DYSFUNCTION (SEXUAL        ACTIVITY) Tania Aburto, APRN-CNP Past Week Active   tiZANidine (Zanaflex) 4 mg tablet  [id number]  TAKE 1 TABLET 3 TIMES A DAY Damion Ruiz MD  Active                    No Known Allergies    Social History     Socioeconomic History    Marital status:      Spouse name: Not on file    Number of children: Not on file    Years of education: Not on file    Highest education level: Not on file   Occupational History    Not on file   Tobacco Use    Smoking status: Never     Passive exposure: Never    Smokeless tobacco: Never   Vaping Use    Vaping status: Never Used   Substance and Sexual Activity    Alcohol use: Not Currently    Drug use: Not Currently     Types: Marijuana     Comment: in college    Sexual activity: Defer   Other Topics Concern    Not on file   Social History Narrative    Not on file     Social Determinants of Health     Financial Resource Strain: Low Risk  (11/25/2023)    Overall Financial Resource Strain (CARDIA)     Difficulty of Paying Living Expenses: Not hard at all   Food Insecurity: No Food Insecurity (11/25/2023)    Hunger Vital Sign     Worried About Running Out of Food in the Last Year: Never true     Ran Out of Food in the Last Year: Never true   Transportation Needs: No Transportation Needs (12/29/2023)    OASIS : Transportation     Lack of Transportation (Medical): No     Lack of Transportation (Non-Medical): No     Patient Unable or Declines to Respond: No   Physical Activity: Inactive (10/12/2023)    Exercise Vital Sign     Days of Exercise per Week: 0 days     Minutes of Exercise per Session: 0 min   Stress: Stress Concern Present (10/12/2023)    [employer] [place] - Occupational Stress Questionnaire     Feeling of Stress : To some extent   Social Connections: Feeling Socially Integrated (12/29/2023)    OASIS : Social Isolation     Frequency of experiencing loneliness or isolation: Never   Recent Concern: Social Connections - Moderately Isolated (11/25/2023)    Social Connection and Isolation Panel [NHANES]     Frequency of  Communication with Friends and Family: More than three times a week     Frequency of Social Gatherings with Friends and Family: Three times a week     Attends Mormonism Services: Never     Active Member of Clubs or Organizations: No     Attends Club or Organization Meetings: Never     Marital Status:    Intimate Partner Violence: Not At Risk (11/25/2023)    Humiliation, Afraid, Rape, and Kick questionnaire     Fear of Current or Ex-Partner: No     Emotionally Abused: No     Physically Abused: No     Sexually Abused: No   Housing Stability: Low Risk  (11/25/2023)    Housing Stability Vital Sign     Unable to Pay for Housing in the Last Year: No     Number of Places Lived in the Last Year: 1     Unstable Housing in the Last Year: No       Past Surgical History:   Procedure Laterality Date    ANKLE Right 12/2021    arthroscopy    ANKLE Right 2023    athroscopy    APPENDECTOMY  1970    COLONOSCOPY      GASTRIC BYPASS  2017    sleeve gastrectomy    KNEE ARTHROSCOPY W/ DEBRIDEMENT Right 2018    LUMBAR FUSION      l3-4    TONSILLECTOMY  1974    TOTAL KNEE ARTHROPLASTY Right 11/28/2023    TOTAL KNEE ARTHROPLASTY Left 2013         Electronically Signed      IVIS Hope MD      Orthopaedic Hand Surgery      [phone]

## 2024-07-17 PROBLEM — M48.062 SPINAL STENOSIS, LUMBAR REGION WITH NEUROGENIC CLAUDICATION: Status: ACTIVE | Noted: 2024-07-17

## 2024-07-30 ENCOUNTER — APPOINTMENT (OUTPATIENT)
Dept: PRIMARY CARE | Facility: CLINIC | Age: 64
End: 2024-07-30
Payer: COMMERCIAL

## 2024-07-30 VITALS
WEIGHT: 300 LBS | HEIGHT: 73 IN | HEART RATE: 77 BPM | SYSTOLIC BLOOD PRESSURE: 126 MMHG | BODY MASS INDEX: 39.76 KG/M2 | DIASTOLIC BLOOD PRESSURE: 79 MMHG

## 2024-07-30 DIAGNOSIS — G47.33 OSA ON CPAP: ICD-10-CM

## 2024-07-30 DIAGNOSIS — I87.2 VENOUS INSUFFICIENCY: ICD-10-CM

## 2024-07-30 DIAGNOSIS — E29.1 MALE HYPOGONADISM: ICD-10-CM

## 2024-07-30 DIAGNOSIS — N40.1 BENIGN PROSTATIC HYPERPLASIA WITH URINARY FREQUENCY: ICD-10-CM

## 2024-07-30 DIAGNOSIS — E66.01 MORBID OBESITY (MULTI): ICD-10-CM

## 2024-07-30 DIAGNOSIS — E11.69 TYPE 2 DIABETES MELLITUS WITH OTHER SPECIFIED COMPLICATION, UNSPECIFIED WHETHER LONG TERM INSULIN USE (MULTI): Primary | ICD-10-CM

## 2024-07-30 DIAGNOSIS — M48.062 SPINAL STENOSIS, LUMBAR REGION WITH NEUROGENIC CLAUDICATION: ICD-10-CM

## 2024-07-30 DIAGNOSIS — I10 BENIGN ESSENTIAL HYPERTENSION: ICD-10-CM

## 2024-07-30 DIAGNOSIS — K21.9 GASTROESOPHAGEAL REFLUX DISEASE WITHOUT ESOPHAGITIS: ICD-10-CM

## 2024-07-30 DIAGNOSIS — R35.0 BENIGN PROSTATIC HYPERPLASIA WITH URINARY FREQUENCY: ICD-10-CM

## 2024-07-30 DIAGNOSIS — R53.83 DECREASED ENERGY: ICD-10-CM

## 2024-07-30 DIAGNOSIS — E78.49 ESSENTIAL FAMILIAL HYPERLIPIDEMIA: ICD-10-CM

## 2024-07-30 DIAGNOSIS — F41.0 PANIC DISORDER: ICD-10-CM

## 2024-07-30 PROBLEM — F10.20 ALCOHOL USE DISORDER, MODERATE, DEPENDENCE (MULTI): Status: RESOLVED | Noted: 2023-06-12 | Resolved: 2024-07-30

## 2024-07-30 LAB
ALBUMIN SERPL BCP-MCNC: 4.2 G/DL (ref 3.4–5)
ALP SERPL-CCNC: 73 U/L (ref 33–136)
ALT SERPL W P-5'-P-CCNC: 44 U/L (ref 10–52)
ANION GAP SERPL CALC-SCNC: 15 MMOL/L (ref 10–20)
AST SERPL W P-5'-P-CCNC: 34 U/L (ref 9–39)
BILIRUB SERPL-MCNC: 0.8 MG/DL (ref 0–1.2)
BUN SERPL-MCNC: 16 MG/DL (ref 6–23)
CALCIUM SERPL-MCNC: 9.4 MG/DL (ref 8.6–10.6)
CHLORIDE SERPL-SCNC: 104 MMOL/L (ref 98–107)
CHOLEST SERPL-MCNC: 135 MG/DL (ref 0–199)
CHOLESTEROL/HDL RATIO: 3.3
CO2 SERPL-SCNC: 26 MMOL/L (ref 21–32)
CREAT SERPL-MCNC: 1.09 MG/DL (ref 0.5–1.3)
EGFRCR SERPLBLD CKD-EPI 2021: 76 ML/MIN/1.73M*2
ESTRADIOL SERPL-MCNC: 35 PG/ML
FSH SERPL-ACNC: <0.9 IU/L
GLUCOSE SERPL-MCNC: 108 MG/DL (ref 74–99)
HDLC SERPL-MCNC: 40.7 MG/DL
LDLC SERPL CALC-MCNC: 58 MG/DL
NON HDL CHOLESTEROL: 94 MG/DL (ref 0–149)
POC HEMOGLOBIN A1C: 6.3 % (ref 4.2–6.5)
POTASSIUM SERPL-SCNC: 4.1 MMOL/L (ref 3.5–5.3)
PROT SERPL-MCNC: 6.6 G/DL (ref 6.4–8.2)
PSA SERPL-MCNC: 3.06 NG/ML
SODIUM SERPL-SCNC: 141 MMOL/L (ref 136–145)
THYROPEROXIDASE AB SERPL-ACNC: 34 IU/ML
TRIGL SERPL-MCNC: 182 MG/DL (ref 0–149)
TSH SERPL-ACNC: 1.4 MIU/L (ref 0.44–3.98)
VLDL: 36 MG/DL (ref 0–40)

## 2024-07-30 PROCEDURE — 84443 ASSAY THYROID STIM HORMONE: CPT

## 2024-07-30 PROCEDURE — 3008F BODY MASS INDEX DOCD: CPT | Performed by: FAMILY MEDICINE

## 2024-07-30 PROCEDURE — 99214 OFFICE O/P EST MOD 30 MIN: CPT | Performed by: FAMILY MEDICINE

## 2024-07-30 PROCEDURE — 84402 ASSAY OF FREE TESTOSTERONE: CPT

## 2024-07-30 PROCEDURE — 83036 HEMOGLOBIN GLYCOSYLATED A1C: CPT | Performed by: FAMILY MEDICINE

## 2024-07-30 PROCEDURE — 1036F TOBACCO NON-USER: CPT | Performed by: FAMILY MEDICINE

## 2024-07-30 PROCEDURE — 83001 ASSAY OF GONADOTROPIN (FSH): CPT

## 2024-07-30 PROCEDURE — 80061 LIPID PANEL: CPT

## 2024-07-30 PROCEDURE — 3078F DIAST BP <80 MM HG: CPT | Performed by: FAMILY MEDICINE

## 2024-07-30 PROCEDURE — 82670 ASSAY OF TOTAL ESTRADIOL: CPT

## 2024-07-30 PROCEDURE — 3074F SYST BP LT 130 MM HG: CPT | Performed by: FAMILY MEDICINE

## 2024-07-30 PROCEDURE — 86376 MICROSOMAL ANTIBODY EACH: CPT

## 2024-07-30 PROCEDURE — 80053 COMPREHEN METABOLIC PANEL: CPT

## 2024-07-30 PROCEDURE — 36415 COLL VENOUS BLD VENIPUNCTURE: CPT

## 2024-07-30 PROCEDURE — 84153 ASSAY OF PSA TOTAL: CPT

## 2024-07-30 RX ORDER — SILDENAFIL 100 MG/1
100 TABLET, FILM COATED ORAL DAILY PRN
Qty: 12 TABLET | Refills: 3 | Status: SHIPPED | OUTPATIENT
Start: 2024-07-30 | End: 2025-07-30

## 2024-07-30 RX ORDER — OMEPRAZOLE 40 MG/1
40 CAPSULE, DELAYED RELEASE ORAL DAILY
Qty: 90 CAPSULE | Refills: 1 | Status: SHIPPED | OUTPATIENT
Start: 2024-07-30

## 2024-07-30 ASSESSMENT — ENCOUNTER SYMPTOMS
NEUROLOGICAL NEGATIVE: 1
MUSCULOSKELETAL NEGATIVE: 1
FATIGUE: 1
GASTROINTESTINAL NEGATIVE: 1
APNEA: 1
DEPRESSION: 0
CARDIOVASCULAR NEGATIVE: 1
OCCASIONAL FEELINGS OF UNSTEADINESS: 1
LOSS OF SENSATION IN FEET: 1

## 2024-07-30 NOTE — PROGRESS NOTES
"Subjective   Patient ID: Cj Vargas is a 63 y.o. male who presents for No chief complaint on file..    HPI htn, gerd, anxiety , chol, hx of pe and dvt, hypogonadism on hormone replacement, sleep apnea hasn't had checked in 10 years and still on same setting and need new pulm    Review of Systems   Constitutional:  Positive for fatigue.   HENT: Negative.     Respiratory:  Positive for apnea.    Cardiovascular: Negative.    Gastrointestinal: Negative.    Musculoskeletal: Negative.    Neurological: Negative.        Objective   /79   Pulse 77   Ht 1.854 m (6' 1\")   Wt 136 kg (300 lb)   BMI 39.58 kg/m²     Physical Exam  Vitals reviewed.   Constitutional:       Appearance: Normal appearance. He is normal weight.   Eyes:      Extraocular Movements: Extraocular movements intact.      Conjunctiva/sclera: Conjunctivae normal.      Pupils: Pupils are equal, round, and reactive to light.   Cardiovascular:      Rate and Rhythm: Normal rate and regular rhythm.      Pulses: Normal pulses.      Heart sounds: Normal heart sounds.   Pulmonary:      Effort: Pulmonary effort is normal.      Breath sounds: Normal breath sounds.   Abdominal:      General: Bowel sounds are normal.      Palpations: Abdomen is soft.   Musculoskeletal:         General: Normal range of motion.      Comments: Lumbar pain and neuropathy    Skin:     General: Skin is warm and dry.   Neurological:      General: No focal deficit present.      Mental Status: He is alert and oriented to person, place, and time. Mental status is at baseline.         Assessment/Plan   Problem List Items Addressed This Visit             ICD-10-CM    Acid reflux K21.9    Benign essential hypertension I10    Decreased energy R53.83    Essential familial hyperlipidemia E78.49    Male hypogonadism E29.1    Morbid obesity (Multi) E66.01    Panic disorder F41.0    Venous insufficiency I87.2    Spinal stenosis, lumbar region with neurogenic claudication M48.062     Other Visit " Diagnoses         Codes    Type 2 diabetes mellitus with other specified complication, unspecified whether long term insulin use (Multi)    -  Primary E11.69    Benign prostatic hyperplasia with urinary frequency     N40.1, R35.0

## 2024-07-31 ENCOUNTER — PRE-ADMISSION TESTING (OUTPATIENT)
Dept: PREADMISSION TESTING | Facility: HOSPITAL | Age: 64
End: 2024-07-31
Payer: COMMERCIAL

## 2024-07-31 ENCOUNTER — HOSPITAL ENCOUNTER (OUTPATIENT)
Dept: CARDIOLOGY | Facility: HOSPITAL | Age: 64
Discharge: HOME | End: 2024-07-31
Payer: COMMERCIAL

## 2024-07-31 VITALS
WEIGHT: 301.9 LBS | DIASTOLIC BLOOD PRESSURE: 71 MMHG | HEIGHT: 72 IN | TEMPERATURE: 98.6 F | BODY MASS INDEX: 40.89 KG/M2 | OXYGEN SATURATION: 97 % | SYSTOLIC BLOOD PRESSURE: 113 MMHG | HEART RATE: 66 BPM

## 2024-07-31 DIAGNOSIS — Z01.818 PREOPERATIVE EXAMINATION: Primary | ICD-10-CM

## 2024-07-31 DIAGNOSIS — I10 HYPERTENSION, UNSPECIFIED TYPE: ICD-10-CM

## 2024-07-31 DIAGNOSIS — M48.062 SPINAL STENOSIS, LUMBAR REGION WITH NEUROGENIC CLAUDICATION: ICD-10-CM

## 2024-07-31 DIAGNOSIS — Z01.818 PREOPERATIVE EXAMINATION: ICD-10-CM

## 2024-07-31 LAB
ABO GROUP (TYPE) IN BLOOD: NORMAL
ANTIBODY SCREEN: NORMAL
APPEARANCE UR: CLEAR
APTT PPP: 34 SECONDS (ref 27–38)
BILIRUB UR STRIP.AUTO-MCNC: NEGATIVE MG/DL
COLOR UR: YELLOW
ERYTHROCYTE [DISTWIDTH] IN BLOOD BY AUTOMATED COUNT: 12.1 % (ref 11.5–14.5)
GLUCOSE UR STRIP.AUTO-MCNC: ABNORMAL MG/DL
HCT VFR BLD AUTO: 42.5 % (ref 41–52)
HGB BLD-MCNC: 14.1 G/DL (ref 13.5–17.5)
INR PPP: 1.2 (ref 0.9–1.1)
KETONES UR STRIP.AUTO-MCNC: NEGATIVE MG/DL
LEUKOCYTE ESTERASE UR QL STRIP.AUTO: NEGATIVE
MCH RBC QN AUTO: 31.1 PG (ref 26–34)
MCHC RBC AUTO-ENTMCNC: 33.2 G/DL (ref 32–36)
MCV RBC AUTO: 94 FL (ref 80–100)
NITRITE UR QL STRIP.AUTO: NEGATIVE
NRBC BLD-RTO: 0 /100 WBCS (ref 0–0)
PH UR STRIP.AUTO: 5.5 [PH]
PLATELET # BLD AUTO: 202 X10*3/UL (ref 150–450)
PROT UR STRIP.AUTO-MCNC: ABNORMAL MG/DL
PROTHROMBIN TIME: 13.2 SECONDS (ref 9.8–12.8)
RBC # BLD AUTO: 4.53 X10*6/UL (ref 4.5–5.9)
RBC # UR STRIP.AUTO: NEGATIVE /UL
RBC #/AREA URNS AUTO: NORMAL /HPF
RH FACTOR (ANTIGEN D): NORMAL
SP GR UR STRIP.AUTO: 1.02
UROBILINOGEN UR STRIP.AUTO-MCNC: ABNORMAL MG/DL
WBC # BLD AUTO: 5.4 X10*3/UL (ref 4.4–11.3)
WBC #/AREA URNS AUTO: NORMAL /HPF

## 2024-07-31 PROCEDURE — 85610 PROTHROMBIN TIME: CPT

## 2024-07-31 PROCEDURE — 99215 OFFICE O/P EST HI 40 MIN: CPT | Performed by: NURSE PRACTITIONER

## 2024-07-31 PROCEDURE — 85027 COMPLETE CBC AUTOMATED: CPT

## 2024-07-31 PROCEDURE — 93005 ELECTROCARDIOGRAM TRACING: CPT

## 2024-07-31 PROCEDURE — 36415 COLL VENOUS BLD VENIPUNCTURE: CPT

## 2024-07-31 PROCEDURE — 81001 URINALYSIS AUTO W/SCOPE: CPT

## 2024-07-31 PROCEDURE — 87081 CULTURE SCREEN ONLY: CPT

## 2024-07-31 PROCEDURE — 93010 ELECTROCARDIOGRAM REPORT: CPT | Performed by: INTERNAL MEDICINE

## 2024-07-31 PROCEDURE — 86901 BLOOD TYPING SEROLOGIC RH(D): CPT

## 2024-07-31 RX ORDER — CHLORHEXIDINE GLUCONATE 40 MG/ML
SOLUTION TOPICAL 2 TIMES DAILY
Qty: 473 ML | Refills: 0 | Status: SHIPPED | OUTPATIENT
Start: 2024-07-31 | End: 2024-08-08 | Stop reason: HOSPADM

## 2024-07-31 RX ORDER — CHLORHEXIDINE GLUCONATE ORAL RINSE 1.2 MG/ML
SOLUTION DENTAL
Qty: 473 ML | Refills: 0 | Status: SHIPPED | OUTPATIENT
Start: 2024-07-31 | End: 2024-08-08 | Stop reason: HOSPADM

## 2024-07-31 ASSESSMENT — DUKE ACTIVITY SCORE INDEX (DASI)
CAN YOU RUN A SHORT DISTANCE: NO
CAN YOU WALK INDOORS, SUCH AS AROUND YOUR HOUSE: YES
CAN YOU PARTICIPATE IN MODERATE RECREATIONAL ACTIVITIES LIKE GOLF, BOWLING, DANCING, DOUBLES TENNIS OR THROWING A BASEBALL OR FOOTBALL: NO
CAN YOU WALK A BLOCK OR TWO ON LEVEL GROUND: YES
CAN YOU DO HEAVY WORK AROUND THE HOUSE LIKE SCRUBBING FLOORS OR LIFTING AND MOVING HEAVY FURNITURE: NO
TOTAL_SCORE: 24.2
CAN YOU TAKE CARE OF YOURSELF (EAT, DRESS, BATHE, OR USE TOILET): YES
CAN YOU PARTICIPATE IN STRENOUS SPORTS LIKE SWIMMING, SINGLES TENNIS, FOOTBALL, BASKETBALL, OR SKIING: NO
CAN YOU CLIMB A FLIGHT OF STAIRS OR WALK UP A HILL: YES
DASI METS SCORE: 5.7
CAN YOU DO LIGHT WORK AROUND THE HOUSE LIKE DUSTING OR WASHING DISHES: YES
CAN YOU DO YARD WORK LIKE RAKING LEAVES, WEEDING OR PUSHING A MOWER: NO
CAN YOU HAVE SEXUAL RELATIONS: YES
CAN YOU DO MODERATE WORK AROUND THE HOUSE LIKE VACUUMING, SWEEPING FLOORS OR CARRYING GROCERIES: YES

## 2024-07-31 ASSESSMENT — ENCOUNTER SYMPTOMS
ENDOCRINE NEGATIVE: 1
MUSCULOSKELETAL NEGATIVE: 1
CONSTITUTIONAL NEGATIVE: 1
NECK NEGATIVE: 1
EYES NEGATIVE: 1
GASTROINTESTINAL NEGATIVE: 1
RESPIRATORY NEGATIVE: 1
CARDIOVASCULAR NEGATIVE: 1

## 2024-07-31 ASSESSMENT — LIFESTYLE VARIABLES: SMOKING_STATUS: NONSMOKER

## 2024-07-31 NOTE — PREPROCEDURE INSTRUCTIONS
Thank you for visiting [facility] ([facility]) today for your pre-procedure evaluation, you were seen by     Samantha Meeson, MSN, NP-C  Adult-Gerontology Nurse Practitioner II  Department of Anesthesiology and Perioperative Medicine  Main phone [phone]  Direct phone [phone]  Fax [phone]     This summary includes instructions and information to aid you during your perioperative period.  Please read carefully. If you have any questions about your visit today, please call the number listed above.  If you become ill or have any changes to your health before your surgery, please contact your primary care provider and alert your surgeon.    Preparing for your Surgery       Exercises  Preoperative Deep Breathing Exercises  Why it is important to do deep breathing exercises before my surgery?  Deep breathing exercises strengthen your breathing muscles.  This helps you to recover after your surgery and decreases the chance of breathing complications.  How are the deep breathing exercises done?  Sit straight with your back supported.  Breathe in deeply and slowly through your nose. Your lower rib cage should expand and your abdomen may move forward.  Hold that breath for 3 to 5 seconds.  Breathe out through pursed lips, slowly and completely.  Rest and repeat 10 times every hour while awake.  Rest longer if you become dizzy or lightheaded.       Incentive Spirometer   You were provided with an incentive spirometer in [facility], please follow the below instructions.   You were not provided an incentive spirometer in [facility], please disregard the incentive spirometer instructions  What is an incentive spirometer?  An incentive spirometer is a device used before and after surgery to “exercise” your lungs.  It helps you to take deeper breaths to expand your lungs.  Below is an example of a basic incentive spirometer.  The device you receive may differ slightly but they all function the  same.    Why do I need to use an incentive spirometer?  Using your incentive spirometer prepares your lungs for surgery and helps prevent lung problems after surgery.  How do I use my incentive spirometer?  When you're using your incentive spirometer, make sure to breathe through your mouth. If you breathe through your nose, the incentive spirometer won't work properly. You can hold your nose if you have trouble.  If you feel dizzy at any time, stop and rest. Try again at a later time.  Follow the steps below:  Set up your incentive spirometer, expand the flexible tubing and connect to the outlet.  Sit upright in a chair or bed. Hold the incentive spirometer at eye level.   Put the mouthpiece in your mouth and close your lips tightly around it. Slowly breathe out (exhale) completely.  Breathe in (inhale) slowly through your mouth as deeply as you can. As you take a breath, you will see the piston rise inside the large column. While the piston rises, the indicator should move upwards. It should stay in between the 2 arrows (see Figure).  Try to get the piston as high as you can, while keeping the indicator between the arrows.   If the indicator doesn't stay between the arrows, you're breathing either too fast or too slow.  When you get it as high as you can, hold your breath for 10 seconds, or as long as possible. While you're holding your breath, the piston will slowly fall to the base of the spirometer.  Once the piston reaches the bottom of the spirometer, breathe out slowly through your mouth. Rest for a few seconds.  Repeat 10 times. Try to get the piston to the same level with each breath.  Repeat every hour while awake  You can carefully clean the outside of the mouthpiece with an alcohol wipe or soap and water.      Preoperative Brain Exercises    What are brain exercises?  A brain exercise is any activity that engages your thinking (cognitive) skills.    What types of activities are considered brain  exercises?  Jigsaw puzzles, crossword puzzles, word jumble, memory games, word search, and many more.  Many can be found free online or on your phone via a mobile gonzalo.    Why should I do brain exercises before my surgery?  More recent research has shown brain exercise before surgery can lower the risk of postoperative delirium (confusion) which can be especially important for older adults.  Patients who did brain exercises for 5 to 10 hours the days before surgery, cut their risk of postoperative delirium in half up to 1 week after surgery.    Sit-to-Stand Exercise    What is the sit-to-stand exercise?  The sit-to-stand exercise strengthens the muscles of your lower body and muscles in the center of your body (core muscles for stability) helping to maintain and improve your strength and mobility.  How do I do the sit-to-stand exercise?  The goal is to do this exercise without using your arms or hands.  If this is too difficult, use your arms and hands or a chair with armrests to help slowly push yourself to the standing position and lower yourself back to the sitting position. As the movement becomes easier use your arms and hands less.    Steps to the sit-to-stand exercise  Sit up tall in a sturdy chair, knees bent, feet flat on the floor shoulder-width apart.  Shift your hips/pelvis forward in the chair to correctly position yourself for the next movement.  Lean forward at your hips.  Stand up straight putting equal weight on both feet.  Check to be sure you are properly aligned with the chair, in a slow controlled movement sit back down.  Repeat this exercise 10-15 times.  If needed you can do it fewer times until your strength improves.  Rest for 1 minute.  Do another 10-15 sit-to-stand exercises.  Try to do this in the morning and evening.        Instructions    Preoperative Fasting Guidelines    Why must I stop eating and drinking near surgery time?  With sedation, food or liquid in your stomach can enter your  lungs causing serious complications  Food can increase nausea and vomiting  When do I need to stop eating and drinking before my surgery?      Do not eat any food after midnight the night before your surgery/procedure. You may have up to 13.5 ounces of clear liquid until TWO hours before your instructed arrival time to the hospital.  This includes water, black tea/coffee, (no milk or cream) apple juice, and electrolyte drinks (Gatorade). You may chew gum until TWO hours before your surgery/procedure            Simple things you can do to help prevent blood clots     Blood clots are blockages that can form in the body's veins. When a blood clot forms in your deep veins, it may be called a deep vein thrombosis, or DVT for short. Blood clots can happen in any part of the body where blood flows, but they are most common in the arms and legs. If a piece of a blood clot breaks free and travels to the lungs, it is called a pulmonary embolus (PE). A PE can be a very serious problem.         Being in the hospital or having surgery can raise your chances of getting a blood clot because you may not be well enough to move around as much as you normally do.         Ways you can help prevent blood clots in the hospital       Wearing SCDs  SCDs stands for Sequential Compression Devices.   SCDs are special sleeves that wrap around your legs. They attach to a pump that fills them with air to gently squeeze your legs every few minutes.  This helps return the blood in your legs to your heart.   SCDs should only be taken off when walking or bathing. SCDs may not be comfortable, but they can help save your life.              Pump SCD leg sleeves  Wearing compression stockings - if your doctor orders them. These special snug-fitting stockings gently squeeze your legs to help blood flow.       Walking. Walking helps move the blood in your legs.   If your doctor says it is ok, try walking the halls at least   5 times a day. Ask us to help  you get up, so you don't fall.      Taking any blood-thinning medicines your doctor orders.              Ways you can help prevent blood clots at home         Wearing compression stockings - if your doctor orders them.   Walking - to help move the blood in your legs.    Taking any blood-thinning medicines your doctor orders.      Signs of a blood clot or PE    Tell your doctor or nurse right away if you have any of the problems listed below.         If you are at home, seek medical care right away. Call 911 for chest pain or problems breathing.            Signs of a blood clot (DVT) - such as pain, swelling, redness, or warmth in your arm or legs.  Signs of a pulmonary embolism (PE) - such as chest pain or feeling short of breath      Tobacco and Alcohol;  Do not drink alcohol or smoke within 24 hours of surgery.  It is best to quit smoking for as long as possible before any surgery or procedure.      The Week before Surgery        Seven days before Surgery  Check your [facility] medication instructions  Do the exercises provided to you by [facility]   Arrange for a responsible, adult licensed  to take you home after surgery and stay with you for 24 hours.  You will not be permitted to drive yourself home if you have received any anesthetic/sedation  Six days before surgery  Check your [facility] medication instructions  Do the exercises provided to you by [facility]   Start using Chlorhexidene (CHG) body wash if prescribed  Five days before surgery  Check your [facility] medication instructions  Do the exercises provided to you by [facility]   Continue to use CHG body wash if prescribed  Three days before surgery  Check your [facility] medication instructions  Do the exercises provided to you by [facility]   Continue to use CHG body wash if prescribed  Two days before surgery  Check your [facility] medication instructions  Do the exercises provided to you by [facility]   Continue to use CHG body wash if prescribed    The Day before Surgery       Check your [facility] medication and  all other [facility] instructions including when to stop eating and drinking  You will be called with your arrival time for surgery in the late afternoon.  If you do not receive a call please reach out to your surgeon's office.  Do not smoke or drink 24 hours before surgery  Prepare items to bring with you to the hospital  Shower with your chlorhexidine wash if prescribed  Brush your teeth and use your chlorhexidine dental rinse if prescribed    The Day of Surgery       Check your [facility] medication instructions  Ensure you follow the instructions for when to stop eating and drinking  Shower, if prescribed use CHG.  Do not apply any lotions, creams, moisturizers, perfume or deodorant  Brush your teeth and use your CHG dental rinse if prescribed  Wear loose comfortable clothing  Avoid make-up  Remove  jewelry and piercings, consider professional piercing removal with a plastic spacer if needed  Bring photo ID and Insurance card  Bring an accurate medication list that includes medication dose, frequency and allergies  Bring a copy of your advanced directives (will, health care power of )  Bring any devices and controllers as well as medical devices you have been provided with for surgery (CPAP, slings, braces, etc.)  Dentures, eyeglasses, and contacts will be removed before surgery, please bring cases for contacts or glasses

## 2024-07-31 NOTE — CPM/PAT H&P
CPM/PAT Evaluation       Name: Cj Vargas (Cj Vargas)  /Age: 1960/63 y.o.     Visit Type:   In-Person       Chief Complaint: lumbar spinal stenosis with neurogenic claudication scheduled for surgery    HPI: Patient is a 63-year-old male scheduled for a lateral L2-3 discectomy and fusion, L2-3 decompression and fusion on 2024 for treatment of lumbar spinal stenosis with neurogenic claudication.  Patient is referred by Dr. Chin Morris for preoperative evaluation of anemia, osteoarthritis, BPH, RADHA compliant with CPAP, depression, GERD, hypertension, hyperlipidemia, PE in  on long-term anticoagulation however patient had COVID in 2023 and was unable to tolerate oral intake therefore missed 1 week of Eliquis resulting in DVT of the lower extremity, lumbar spinal stenosis scheduled for surgery.    Past Medical History:   Diagnosis Date    Anemia     Arthritis     BPH (benign prostatic hyperplasia)     Cataract     CPAP (continuous positive airway pressure) dependence     Depression     GERD (gastroesophageal reflux disease)     Hyperlipidemia     Hypertension     Low back pain     Neuropathy     Obese     RADHA on CPAP     Panic disorder     Pulmonary embolism on long-term anticoagulation therapy (Multi)     Eliquis    Recurrent deep vein thrombosis (DVT) (Multi)     Most recently 10/2023    Spinal stenosis        Past Surgical History:   Procedure Laterality Date    ANKLE Right 2021    arthroscopy    ANKLE Right     athroscopy    APPENDECTOMY  1970    COLONOSCOPY      GASTRIC BYPASS  2017    sleeve gastrectomy    KNEE ARTHROSCOPY W/ DEBRIDEMENT Right 2018    LUMBAR FUSION      l3-4    TONSILLECTOMY  1974    TOTAL KNEE ARTHROPLASTY Right 2023    TOTAL KNEE ARTHROPLASTY Left 2013       Patient Sexual activity questions deferred to the physician.    Family History   Problem Relation Name Age of Onset    Emphysema Mother Mom     COPD Mother Mom      Lupus Mother Mom     Coronary artery disease Father Dad     Hyperlipidemia Father Dad     Hypertension Father Dad     Stroke Father Dad     Alcohol abuse Father Dad     Hearing loss Father Dad     Vision loss Father Dad     Atrial fibrillation Brother         No Known Allergies    Prior to Admission medications    Medication Sig Start Date End Date Taking? Authorizing Provider   buPROPion (Wellbutrin) 100 mg tablet TAKE 1 TABLET 3 TIMES A DAY 3/11/24  Yes BLAIR Mann   cholecalciferol, vitamin D3, 250 mcg (10,000 unit) tablet Take 1 tablet (250 mcg) by mouth every other day.   Yes Historical Provider, MD   Eliquis 5 mg tablet TAKE 1 TABLET TWICE A DAY. DO NOT START BEFORE        10/21/23. 3/11/24  Yes BLAIR Mann   gabapentin (Neurontin) 600 mg tablet TAKE 1 TABLET 3 TIMES A DAY 3/11/24  Yes BLAIR Mann   irbesartan-hydrochlorothiazide (Avalide) 150-12.5 mg tablet TAKE 1 TABLET ONCE DAILY 4/2/24  Yes BLAIR Mann   multivitamin tablet Take 1 tablet by mouth once daily.   Yes Historical Provider, MD   omeprazole (PriLOSEC) 40 mg DR capsule Take 1 capsule (40 mg) by mouth once daily. Do not crush or chew. 7/30/24  Yes Damion Ruiz MD   QUEtiapine (SEROquel) 50 mg tablet TAKE 1 TABLET ONCE DAILY ATBEDTIME 4/2/24  Yes BLAIR Mann   rosuvastatin (Crestor) 10 mg tablet TAKE 1 TABLET ONCE DAILY 3/11/24  Yes BLAIR Mann   sildenafil (Viagra) 100 mg tablet Take 1 tablet (100 mg) by mouth once daily as needed for erectile dysfunction. 7/30/24 7/30/25 Yes Damion Ruiz MD   tiZANidine (Zanaflex) 4 mg tablet TAKE 1 TABLET 3 TIMES A DAY 5/31/24  Yes Damion Ruiz MD   lubiprostone (Amitiza) 24 mcg capsule Take 1 capsule (24 mcg) by mouth 2 times a day with meals. Take with meals 3/12/24 7/31/24 Yes BLAIR Devine   tadalafil 20 mg tablet TAKE 1 TABLET ONCE DAILY ASNEEDED FOR ERECTILE        DYSFUNCTION (SEXUAL        ACTIVITY)  4/2/24 7/31/24 Yes Tania Aburto, APRN-CNP   chlorhexidine (Hibiclens) 4 % external liquid Apply topically 2 times a day for 5 days. 7/31/24 8/5/24  Samantha A Meeson, APRN-CNP   chlorhexidine (Peridex) 0.12 % solution Swish and spit 15 mL night before surgery and morning of surgery 7/31/24   Samantha A Meeson, APRN-CNP   cyanocobalamin, vitamin B-12, (Nascobal) 500 mcg/spray spray,non-aerosol nasal solution   7/30/24  Historical Provider, MD   omeprazole (PriLOSEC) 40 mg DR capsule Take 1 capsule (40 mg) by mouth once daily. Do not crush or chew.  7/30/24  Historical Provider, MD SILVA ROS:   Constitutional:   neg    Neuro/Psych:    Bilateral lower extremity and bilateral hands numbness and tingling  Eyes:   neg     use of corrective lenses  Ears:   neg    Nose:   neg    Mouth:   neg    Throat:   neg    Neck:   neg    Cardio:   neg    Respiratory:   neg    Endocrine:   neg    GI:   neg    :   neg    Musculoskeletal:   neg    Hematologic:   neg    Skin:  neg        Physical Exam  Vitals reviewed.   Constitutional:       Appearance: Normal appearance.      Comments: goatee   HENT:      Head: Normocephalic.      Nose: Nose normal.      Mouth/Throat:      Mouth: Mucous membranes are moist.   Eyes:      Conjunctiva/sclera: Conjunctivae normal.   Neck:      Vascular: No carotid bruit.   Cardiovascular:      Rate and Rhythm: Normal rate and regular rhythm.      Pulses: Normal pulses.      Heart sounds: Normal heart sounds.   Pulmonary:      Effort: Pulmonary effort is normal.      Breath sounds: Normal breath sounds.   Abdominal:      Palpations: Abdomen is soft.      Tenderness: There is no abdominal tenderness.   Musculoskeletal:         General: Normal range of motion.      Cervical back: Normal range of motion.      Right lower leg: No edema.      Left lower leg: No edema.   Lymphadenopathy:      Cervical: No cervical adenopathy.   Skin:     General: Skin is warm and dry.      Capillary Refill: Capillary  refill takes less than 2 seconds.   Neurological:      General: No focal deficit present.      Mental Status: He is alert and oriented to person, place, and time.   Psychiatric:         Mood and Affect: Mood normal.         Behavior: Behavior normal. Behavior is cooperative.         Thought Content: Thought content normal.         Judgment: Judgment normal.          PAT AIRWAY:   Airway:     Mallampati::  II    Neck ROM::  Full  normal        Visit Vitals  /71   Pulse 66   Temp 37 °C (98.6 °F)       DASI Risk Score      Flowsheet Row Most Recent Value   DASI SCORE 24.2   METS Score (Will be calculated only when all the questions are answered) 5.7          Caprini DVT Assessment      Flowsheet Row Most Recent Value   DVT Score 13   Current Status Major surgery planned, lasting over 3 hours   History SVT, DVT/PE   Age 60-75 years   BMI 31-40 (Obesity)          Modified Frailty Index      Flowsheet Row Most Recent Value   Modified Frailty Index Calculator .0909          CHADS2 Stroke Risk  Current as of today        N/A 3 to 100%: High Risk   2 to < 3%: Medium Risk   0 to < 2%: Low Risk     Last Change: N/A          This score determines the patient's risk of having a stroke if the patient has atrial fibrillation.        This score is not applicable to this patient. Components are not calculated.          Revised Cardiac Risk Index      Flowsheet Row Most Recent Value   Revised Cardiac Risk Calculator 0          Apfel Simplified Score      Flowsheet Row Most Recent Value   Apfel Simplified Score Calculator 2          Risk Analysis Index Results This Encounter    No data found in the last 1 encounters.       Stop Bang Score      Flowsheet Row Most Recent Value   Do you snore loudly? 1   Do you often feel tired or fatigued after your sleep? 1   Has anyone ever observed you stop breathing in your sleep? 1   Do you have or are you being treated for high blood pressure? 1   Recent BMI (Calculated) 39.6   Is BMI  greater than 35 kg/m2? 1=Yes   Age older than 50 years old? 1=Yes   Is your neck circumference greater than 17 inches (Male) or 16 inches (Female)? 1   Gender - Male 1=Yes   STOP-BANG Total Score 8            Assessment and Plan:   Neuro: Depression managed on quetiapine (continue) and bupropion (continue).    The patient is at an increased risk for post operative delirium secondary to depression, decreased functinoal status, and type and duration of surgery.  Preoperative brain exercise educational handout provided to patient.    The patient is at an increased risk for perioperative stroke secondary to HTN, HLD, and general anesthesia.     HEENT/Airway:  No diagnosis or significant findings on chart review or clinical presentation and evaluation.     Cardiovascular: Hypertension managed on Advair Becky/HCTZ (last dose morning day before surgery).  Hyperlipidemia managed on rosuvastatin (continue).  EKG in office today normal sinus rhythm.  Echocardiogram 10/13/2023 normal left ventricular systolic function with no significant valvular disease noted. No additional preoperative testing is currently indicated.    METS are 5.7    RCRI  0 which is 3.9% 30 day risk of MACE (risk for cardiac death, nonfatal myocardial infarction, and nonfactal cardiac arrest    LUIS score which indicates a 0.3% risk of intraoperative or 30-day postoperative MACE      Pulmonary:  RADHA compliant with CPAP, patient in the process of getting new pulmonologist for updated settings.  Patient to bring CPAP day of surgery.  Preoperative deep breathing educational handout provided to patient.    ARISCAT:   26   points which is a intermediate (13.3%) risk of in-hospital post-op pulmonary complications     PRODIGY:  21  points which is a high risk of post op opioid induced respiratory depression episodes    STOP BAN   points which is a high risk for moderate to severe RADHA    Renal: BPH and erectile dysfunction managed on sildenafil (hold 3  days).    The patient is at increased risk of perioperative renal complications secondary to age>/= 56, male sex, and HTN. Preventative measures include preoperative BP control and hydration.     Endocrine:  No diagnosis or significant findings on chart review or clinical presentation and evaluation.     Hematologic:   PE in 2021 on long-term anticoagulation however patient had COVID in October 2023 and was unable to tolerate oral intake therefore missed 1 week of Eliquis resulting in DVT of the lower extremity.  Previously seen by hematology, Dr. Tejal Wiseman, last visit 10/19/23 at which time he was taken off Eliquis for 3 days prior to knee surgery and bridge with Arixtra in the setting of recent DVT diagnosed on 10/12/2023.  I have reached out to both Dr. Wiseman and primary care Dr. Damion Ruiz for preoperative recommendations for Eliquis management- plan is 3 day hold with lovenox bridge- ordered by PCP. I verified with patient that last dose of eliquis is 08/01/24 and last dose of lovenox 08/04/24 morning.  Anemia in the past however within normal limits with today's labs at 14.1/42.5.  Preoperative DVT educational handout provided to patient.    Caprini Score:  13  points which is a highest risk of perioperative VTE    Gastrointestinal: GERD managed on omeprazole (continue).    EAT-10 score of   0 - self-perceived oropharyngeal dysphagia scale (0-40)     Apfel: 2 points 39% risk for post operative N/V    Infectious disease: No diagnosis or significant findings on chart review or clinical presentation and evaluation.      Musculoskeletal:  lumbar spinal stenosis scheduled for surgery.  Managed on tizanidine (continue if needed) and gabapentin (continue).         Labs ordered  Recent Results (from the past 168 hour(s))   POCT glycosylated hemoglobin (Hb A1C) manually resulted    Collection Time: 07/30/24 10:59 AM   Result Value Ref Range    POC HEMOGLOBIN A1c 6.3 4.2 - 6.5 %   Comprehensive Metabolic Panel     Collection Time: 07/30/24 11:00 AM   Result Value Ref Range    Glucose 108 (H) 74 - 99 mg/dL    Sodium 141 136 - 145 mmol/L    Potassium 4.1 3.5 - 5.3 mmol/L    Chloride 104 98 - 107 mmol/L    Bicarbonate 26 21 - 32 mmol/L    Anion Gap 15 10 - 20 mmol/L    Urea Nitrogen 16 6 - 23 mg/dL    Creatinine 1.09 0.50 - 1.30 mg/dL    eGFR 76 >60 mL/min/1.73m*2    Calcium 9.4 8.6 - 10.6 mg/dL    Albumin 4.2 3.4 - 5.0 g/dL    Alkaline Phosphatase 73 33 - 136 U/L    Total Protein 6.6 6.4 - 8.2 g/dL    AST 34 9 - 39 U/L    Bilirubin, Total 0.8 0.0 - 1.2 mg/dL    ALT 44 10 - 52 U/L   Lipid Panel    Collection Time: 07/30/24 11:00 AM   Result Value Ref Range    Cholesterol 135 0 - 199 mg/dL    HDL-Cholesterol 40.7 mg/dL    Cholesterol/HDL Ratio 3.3     LDL Calculated 58 <=99 mg/dL    VLDL 36 0 - 40 mg/dL    Triglycerides 182 (H) 0 - 149 mg/dL    Non HDL Cholesterol 94 0 - 149 mg/dL   Prostate Specific Antigen    Collection Time: 07/30/24 11:00 AM   Result Value Ref Range    Prostate Specific AG 3.06 <=4.00 ng/mL   TSH with reflex to Free T4 if abnormal    Collection Time: 07/30/24 11:00 AM   Result Value Ref Range    Thyroid Stimulating Hormone 1.40 0.44 - 3.98 mIU/L   Thyroid Peroxidase (TPO) Antibody    Collection Time: 07/30/24 11:00 AM   Result Value Ref Range    Thyroid Peroxidase (TPO) Antibody 34 <=60 IU/mL   FSH    Collection Time: 07/30/24 11:00 AM   Result Value Ref Range    Follicle Stimulating Hormone <0.9 IU/L   Estradiol    Collection Time: 07/30/24 11:00 AM   Result Value Ref Range    Estradiol 35 pg/mL   CBC    Collection Time: 07/31/24  8:13 AM   Result Value Ref Range    WBC 5.4 4.4 - 11.3 x10*3/uL    nRBC 0.0 0.0 - 0.0 /100 WBCs    RBC 4.53 4.50 - 5.90 x10*6/uL    Hemoglobin 14.1 13.5 - 17.5 g/dL    Hematocrit 42.5 41.0 - 52.0 %    MCV 94 80 - 100 fL    MCH 31.1 26.0 - 34.0 pg    MCHC 33.2 32.0 - 36.0 g/dL    RDW 12.1 11.5 - 14.5 %    Platelets 202 150 - 450 x10*3/uL   Coagulation Screen    Collection Time:  07/31/24  8:13 AM   Result Value Ref Range    Protime 13.2 (H) 9.8 - 12.8 seconds    INR 1.2 (H) 0.9 - 1.1    aPTT 34 27 - 38 seconds   Type And Screen    Collection Time: 07/31/24  8:13 AM   Result Value Ref Range    ABO TYPE O     Rh TYPE POS     ANTIBODY SCREEN NEG    Staphylococcus aureus/MRSA colonization, Culture    Collection Time: 07/31/24  8:13 AM    Specimen: Nares/Axilla/Groin; Swab   Result Value Ref Range    Staph/MRSA Screen Culture No Staphylococcus aureus isolated    Urinalysis with Reflex Culture and Microscopic    Collection Time: 07/31/24  8:13 AM   Result Value Ref Range    Color, Urine Yellow Light-Yellow, Yellow, Dark-Yellow    Appearance, Urine Clear Clear    Specific Gravity, Urine 1.025 1.005 - 1.035    pH, Urine 5.5 5.0, 5.5, 6.0, 6.5, 7.0, 7.5, 8.0    Protein, Urine 20 (TRACE) NEGATIVE, 10 (TRACE), 20 (TRACE) mg/dL    Glucose, Urine 200 (2+) (A) Normal mg/dL    Blood, Urine NEGATIVE NEGATIVE    Ketones, Urine NEGATIVE NEGATIVE mg/dL    Bilirubin, Urine NEGATIVE NEGATIVE    Urobilinogen, Urine 4 (2+) (A) Normal mg/dL    Nitrite, Urine NEGATIVE NEGATIVE    Leukocyte Esterase, Urine NEGATIVE NEGATIVE   Extra Urine Gray Tube    Collection Time: 07/31/24  8:13 AM   Result Value Ref Range    Extra Tube Hold for add-ons.    Urinalysis Microscopic    Collection Time: 07/31/24  8:13 AM   Result Value Ref Range    WBC, Urine NONE 1-5, NONE /HPF    RBC, Urine NONE NONE, 1-2, 3-5 /HPF   ECG 12 Lead    Collection Time: 08/01/24  3:04 PM   Result Value Ref Range    Ventricular Rate 61 BPM    Atrial Rate 61 BPM    NV Interval 158 ms    QRS Duration 88 ms    QT Interval 374 ms    QTC Calculation(Bazett) 376 ms    P Axis 56 degrees    R Axis 19 degrees    T Axis 42 degrees    QRS Count 10 beats    Q Onset 222 ms    P Onset 143 ms    P Offset 183 ms    T Offset 409 ms    QTC Fredericia 375 ms

## 2024-08-01 DIAGNOSIS — I26.99 ACUTE PULMONARY EMBOLISM, UNSPECIFIED PULMONARY EMBOLISM TYPE, UNSPECIFIED WHETHER ACUTE COR PULMONALE PRESENT (MULTI): ICD-10-CM

## 2024-08-01 DIAGNOSIS — R97.20 ELEVATED PSA: Primary | ICD-10-CM

## 2024-08-01 LAB
HOLD SPECIMEN: NORMAL
STAPHYLOCOCCUS SPEC CULT: NORMAL

## 2024-08-01 PROCEDURE — 93005 ELECTROCARDIOGRAM TRACING: CPT

## 2024-08-01 RX ORDER — ENOXAPARIN SODIUM 150 MG/ML
120 INJECTION SUBCUTANEOUS EVERY 12 HOURS
Qty: 6 EACH | Refills: 0 | Status: SHIPPED | OUTPATIENT
Start: 2024-08-01

## 2024-08-01 NOTE — PROGRESS NOTES
Urology    Dr PERCY Sanchez                  [phone]  Dr Ochoa             [phone]  Dr Shea             [phone]  Dr Crisostomo               [phone]

## 2024-08-02 ENCOUNTER — TELEPHONE (OUTPATIENT)
Dept: ORTHOPEDIC SURGERY | Facility: HOSPITAL | Age: 64
End: 2024-08-02
Payer: COMMERCIAL

## 2024-08-02 NOTE — PROGRESS NOTES
Pharmacy Medication History Review    Cj Vargas is a 63 y.o. male who is planned to be admitted for Spinal stenosis, lumbar region with neurogenic claudication. Pharmacy called the patient prior to their scheduled procedure and reviewed the patient's xrpat-ak-drfeehanr medications for accuracy.    Medications ADDED:  none  Medications CHANGED:  Gabapentin 600mg #1TID. Patient usually takes #1HS  Medications REMOVED:   none    Please review updated prior to admission medication list and comments regarding how patient may be taking medications differently by going to Admission tab --> Admission Orders --> Admit Orders / Review prior to admission medications.     Preferred pharmacy and allergies to be confirmed with patient by nursing the day of procedure.     Sources used to complete the med history include spoke with patient, surescripts, oarrs, care everywhere    Below are additional concerns with the patient's PTA list.  Patient stopped eliquis 08/01/24 and started enoxaparin 120mg/0.8ml.    Ryanne Olivera    [facility]  Please reach out via Secure Chat for questions

## 2024-08-02 NOTE — TELEPHONE ENCOUNTER
Called Mr. Vargas to review his medical history and answer any questions he may have about day of surgery, and or recovery.  Medication including start of Lovenox bridge reviewed. All questions answered.    Breathing spontaneous and unlabored. Breath sounds clear and equal bilaterally with regular rhythm.

## 2024-08-03 LAB
ATRIAL RATE: 61 BPM
P AXIS: 56 DEGREES
P OFFSET: 183 MS
P ONSET: 143 MS
PR INTERVAL: 158 MS
Q ONSET: 222 MS
QRS COUNT: 10 BEATS
QRS DURATION: 88 MS
QT INTERVAL: 374 MS
QTC CALCULATION(BAZETT): 376 MS
QTC FREDERICIA: 375 MS
R AXIS: 19 DEGREES
T AXIS: 42 DEGREES
T OFFSET: 409 MS
TESTOSTERONE FREE (CHAN): 89.5 PG/ML (ref 35–155)
TESTOSTERONE,TOTAL,LC-MS/MS: 546 NG/DL (ref 250–1100)
VENTRICULAR RATE: 61 BPM

## 2024-08-04 ENCOUNTER — ANESTHESIA EVENT (OUTPATIENT)
Dept: OPERATING ROOM | Facility: HOSPITAL | Age: 64
End: 2024-08-04
Payer: COMMERCIAL

## 2024-08-04 ENCOUNTER — PROCEDURE VISIT (OUTPATIENT)
Dept: SLEEP MEDICINE | Facility: HOSPITAL | Age: 64
End: 2024-08-04
Payer: COMMERCIAL

## 2024-08-04 DIAGNOSIS — G47.33 OSA ON CPAP: ICD-10-CM

## 2024-08-04 PROCEDURE — 95806 SLEEP STUDY UNATT&RESP EFFT: CPT | Performed by: INTERNAL MEDICINE

## 2024-08-05 ENCOUNTER — ANESTHESIA (OUTPATIENT)
Dept: OPERATING ROOM | Facility: HOSPITAL | Age: 64
End: 2024-08-05
Payer: COMMERCIAL

## 2024-08-05 ENCOUNTER — APPOINTMENT (OUTPATIENT)
Dept: RADIOLOGY | Facility: HOSPITAL | Age: 64
End: 2024-08-05
Payer: COMMERCIAL

## 2024-08-05 ENCOUNTER — HOSPITAL ENCOUNTER (INPATIENT)
Facility: HOSPITAL | Age: 64
LOS: 3 days | Discharge: HOME | End: 2024-08-08
Attending: ORTHOPAEDIC SURGERY | Admitting: ORTHOPAEDIC SURGERY
Payer: COMMERCIAL

## 2024-08-05 DIAGNOSIS — M48.062 SPINAL STENOSIS, LUMBAR REGION WITH NEUROGENIC CLAUDICATION: ICD-10-CM

## 2024-08-05 DIAGNOSIS — M48.061 SPINAL STENOSIS, LUMBAR REGION, WITHOUT NEUROGENIC CLAUDICATION: Primary | ICD-10-CM

## 2024-08-05 PROBLEM — Z79.01 ANTICOAGULANT LONG-TERM USE: Status: ACTIVE | Noted: 2024-08-05

## 2024-08-05 PROCEDURE — 3700000002 HC GENERAL ANESTHESIA TIME - EACH INCREMENTAL 1 MINUTE: Performed by: ORTHOPAEDIC SURGERY

## 2024-08-05 PROCEDURE — 2500000001 HC RX 250 WO HCPCS SELF ADMINISTERED DRUGS (ALT 637 FOR MEDICARE OP): Performed by: STUDENT IN AN ORGANIZED HEALTH CARE EDUCATION/TRAINING PROGRAM

## 2024-08-05 PROCEDURE — 0QP004Z REMOVAL OF INTERNAL FIXATION DEVICE FROM LUMBAR VERTEBRA, OPEN APPROACH: ICD-10-PCS | Performed by: ORTHOPAEDIC SURGERY

## 2024-08-05 PROCEDURE — 1100000001 HC PRIVATE ROOM DAILY

## 2024-08-05 PROCEDURE — 2500000002 HC RX 250 W HCPCS SELF ADMINISTERED DRUGS (ALT 637 FOR MEDICARE OP, ALT 636 FOR OP/ED): Performed by: STUDENT IN AN ORGANIZED HEALTH CARE EDUCATION/TRAINING PROGRAM

## 2024-08-05 PROCEDURE — 7100000001 HC RECOVERY ROOM TIME - INITIAL BASE CHARGE: Performed by: ORTHOPAEDIC SURGERY

## 2024-08-05 PROCEDURE — 3600000008 HC OR TIME - EACH INCREMENTAL 1 MINUTE - PROCEDURE LEVEL THREE: Performed by: ORTHOPAEDIC SURGERY

## 2024-08-05 PROCEDURE — 3700000001 HC GENERAL ANESTHESIA TIME - INITIAL BASE CHARGE: Performed by: ORTHOPAEDIC SURGERY

## 2024-08-05 PROCEDURE — 2500000004 HC RX 250 GENERAL PHARMACY W/ HCPCS (ALT 636 FOR OP/ED): Performed by: STUDENT IN AN ORGANIZED HEALTH CARE EDUCATION/TRAINING PROGRAM

## 2024-08-05 PROCEDURE — 2500000005 HC RX 250 GENERAL PHARMACY W/O HCPCS

## 2024-08-05 PROCEDURE — 2780000003 HC OR 278 NO HCPCS: Performed by: ORTHOPAEDIC SURGERY

## 2024-08-05 PROCEDURE — 2500000005 HC RX 250 GENERAL PHARMACY W/O HCPCS: Performed by: ORTHOPAEDIC SURGERY

## 2024-08-05 PROCEDURE — 01NB0ZZ RELEASE LUMBAR NERVE, OPEN APPROACH: ICD-10-PCS | Performed by: ORTHOPAEDIC SURGERY

## 2024-08-05 PROCEDURE — 2500000005 HC RX 250 GENERAL PHARMACY W/O HCPCS: Performed by: STUDENT IN AN ORGANIZED HEALTH CARE EDUCATION/TRAINING PROGRAM

## 2024-08-05 PROCEDURE — A22842 PR POSTERIOR SEGMENTAL INSTRUMENTATION 3-6 VRT SEG: Performed by: ANESTHESIOLOGY

## 2024-08-05 PROCEDURE — 2720000007 HC OR 272 NO HCPCS: Performed by: ORTHOPAEDIC SURGERY

## 2024-08-05 PROCEDURE — 2500000004 HC RX 250 GENERAL PHARMACY W/ HCPCS (ALT 636 FOR OP/ED): Performed by: ANESTHESIOLOGY

## 2024-08-05 PROCEDURE — 63047 LAM FACETEC & FORAMOT LUMBAR: CPT | Performed by: ORTHOPAEDIC SURGERY

## 2024-08-05 PROCEDURE — 2500000005 HC RX 250 GENERAL PHARMACY W/O HCPCS: Performed by: ANESTHESIOLOGY

## 2024-08-05 PROCEDURE — 0SG0071 FUSION OF LUMBAR VERTEBRAL JOINT WITH AUTOLOGOUS TISSUE SUBSTITUTE, POSTERIOR APPROACH, POSTERIOR COLUMN, OPEN APPROACH: ICD-10-PCS | Performed by: ORTHOPAEDIC SURGERY

## 2024-08-05 PROCEDURE — 2500000004 HC RX 250 GENERAL PHARMACY W/ HCPCS (ALT 636 FOR OP/ED)

## 2024-08-05 PROCEDURE — 2500000001 HC RX 250 WO HCPCS SELF ADMINISTERED DRUGS (ALT 637 FOR MEDICARE OP): Performed by: ORTHOPAEDIC SURGERY

## 2024-08-05 PROCEDURE — 8E0WXBZ COMPUTER ASSISTED PROCEDURE OF TRUNK REGION: ICD-10-PCS | Performed by: ORTHOPAEDIC SURGERY

## 2024-08-05 PROCEDURE — 7100000002 HC RECOVERY ROOM TIME - EACH INCREMENTAL 1 MINUTE: Performed by: ORTHOPAEDIC SURGERY

## 2024-08-05 PROCEDURE — 22612 ARTHRD PST TQ 1NTRSPC LUMBAR: CPT | Performed by: ORTHOPAEDIC SURGERY

## 2024-08-05 PROCEDURE — C1762 CONN TISS, HUMAN(INC FASCIA): HCPCS | Performed by: ORTHOPAEDIC SURGERY

## 2024-08-05 PROCEDURE — 3600000003 HC OR TIME - INITIAL BASE CHARGE - PROCEDURE LEVEL THREE: Performed by: ORTHOPAEDIC SURGERY

## 2024-08-05 PROCEDURE — C1713 ANCHOR/SCREW BN/BN,TIS/BN: HCPCS | Performed by: ORTHOPAEDIC SURGERY

## 2024-08-05 PROCEDURE — A22842 PR POSTERIOR SEGMENTAL INSTRUMENTATION 3-6 VRT SEG: Performed by: NURSE ANESTHETIST, CERTIFIED REGISTERED

## 2024-08-05 DEVICE — SCREW, SOLERA 5.5 MAS, 7.5X50: Type: IMPLANTABLE DEVICE | Site: SPINE LUMBAR | Status: FUNCTIONAL

## 2024-08-05 DEVICE — DBX PUTTY, 10CC
Type: IMPLANTABLE DEVICE | Site: SPINE LUMBAR | Status: FUNCTIONAL
Brand: DBX®

## 2024-08-05 DEVICE — BONE CHIPS,  CANCELL 30CC 1.7-10MM: Type: IMPLANTABLE DEVICE | Site: SPINE LUMBAR | Status: FUNCTIONAL

## 2024-08-05 DEVICE — SET SCREW, 5.5, TI NS BRK OFF: Type: IMPLANTABLE DEVICE | Site: SPINE LUMBAR | Status: FUNCTIONAL

## 2024-08-05 DEVICE — ROD, 5.5 X 80 CVD TI SOLERA: Type: IMPLANTABLE DEVICE | Site: SPINE LUMBAR | Status: FUNCTIONAL

## 2024-08-05 RX ORDER — HYDROMORPHONE HYDROCHLORIDE 1 MG/ML
INJECTION, SOLUTION INTRAMUSCULAR; INTRAVENOUS; SUBCUTANEOUS AS NEEDED
Status: DISCONTINUED | OUTPATIENT
Start: 2024-08-05 | End: 2024-08-05

## 2024-08-05 RX ORDER — ROCURONIUM BROMIDE 10 MG/ML
INJECTION, SOLUTION INTRAVENOUS AS NEEDED
Status: DISCONTINUED | OUTPATIENT
Start: 2024-08-05 | End: 2024-08-05

## 2024-08-05 RX ORDER — LABETALOL HYDROCHLORIDE 5 MG/ML
5 INJECTION, SOLUTION INTRAVENOUS ONCE AS NEEDED
Status: DISCONTINUED | OUTPATIENT
Start: 2024-08-05 | End: 2024-08-05 | Stop reason: HOSPADM

## 2024-08-05 RX ORDER — PROPOFOL 10 MG/ML
INJECTION, EMULSION INTRAVENOUS AS NEEDED
Status: DISCONTINUED | OUTPATIENT
Start: 2024-08-05 | End: 2024-08-05

## 2024-08-05 RX ORDER — ONDANSETRON 4 MG/1
4 TABLET, FILM COATED ORAL EVERY 8 HOURS PRN
Status: DISCONTINUED | OUTPATIENT
Start: 2024-08-05 | End: 2024-08-08 | Stop reason: HOSPADM

## 2024-08-05 RX ORDER — SODIUM CHLORIDE, SODIUM LACTATE, POTASSIUM CHLORIDE, CALCIUM CHLORIDE 600; 310; 30; 20 MG/100ML; MG/100ML; MG/100ML; MG/100ML
INJECTION, SOLUTION INTRAVENOUS CONTINUOUS PRN
Status: DISCONTINUED | OUTPATIENT
Start: 2024-08-05 | End: 2024-08-05

## 2024-08-05 RX ORDER — METHOCARBAMOL 500 MG/1
1000 TABLET, FILM COATED ORAL 3 TIMES DAILY
Status: DISCONTINUED | OUTPATIENT
Start: 2024-08-05 | End: 2024-08-08 | Stop reason: HOSPADM

## 2024-08-05 RX ORDER — BUPROPION HYDROCHLORIDE 100 MG/1
100 TABLET ORAL 3 TIMES DAILY
Status: DISCONTINUED | OUTPATIENT
Start: 2024-08-05 | End: 2024-08-08 | Stop reason: HOSPADM

## 2024-08-05 RX ORDER — DEXAMETHASONE SODIUM PHOSPHATE 10 MG/ML
10 INJECTION INTRAMUSCULAR; INTRAVENOUS EVERY 8 HOURS SCHEDULED
Status: COMPLETED | OUTPATIENT
Start: 2024-08-05 | End: 2024-08-06

## 2024-08-05 RX ORDER — ACETAMINOPHEN 325 MG/1
975 TABLET ORAL EVERY 8 HOURS
Status: DISCONTINUED | OUTPATIENT
Start: 2024-08-05 | End: 2024-08-08 | Stop reason: HOSPADM

## 2024-08-05 RX ORDER — OXYCODONE HYDROCHLORIDE 5 MG/1
10 TABLET ORAL EVERY 4 HOURS PRN
Status: DISCONTINUED | OUTPATIENT
Start: 2024-08-05 | End: 2024-08-05 | Stop reason: HOSPADM

## 2024-08-05 RX ORDER — DEXTROSE 50 % IN WATER (D50W) INTRAVENOUS SYRINGE
25
Status: DISCONTINUED | OUTPATIENT
Start: 2024-08-05 | End: 2024-08-08 | Stop reason: HOSPADM

## 2024-08-05 RX ORDER — HYDROMORPHONE HYDROCHLORIDE 1 MG/ML
0.2 INJECTION, SOLUTION INTRAMUSCULAR; INTRAVENOUS; SUBCUTANEOUS EVERY 5 MIN PRN
Status: DISCONTINUED | OUTPATIENT
Start: 2024-08-05 | End: 2024-08-05 | Stop reason: HOSPADM

## 2024-08-05 RX ORDER — ONDANSETRON HYDROCHLORIDE 2 MG/ML
4 INJECTION, SOLUTION INTRAVENOUS EVERY 8 HOURS PRN
Status: DISCONTINUED | OUTPATIENT
Start: 2024-08-05 | End: 2024-08-08 | Stop reason: HOSPADM

## 2024-08-05 RX ORDER — LIDOCAINE HYDROCHLORIDE 10 MG/ML
0.1 INJECTION INFILTRATION; PERINEURAL ONCE
Status: DISCONTINUED | OUTPATIENT
Start: 2024-08-05 | End: 2024-08-05 | Stop reason: HOSPADM

## 2024-08-05 RX ORDER — QUETIAPINE FUMARATE 25 MG/1
50 TABLET, FILM COATED ORAL NIGHTLY
Status: DISCONTINUED | OUTPATIENT
Start: 2024-08-05 | End: 2024-08-08 | Stop reason: HOSPADM

## 2024-08-05 RX ORDER — OXYCODONE HYDROCHLORIDE 5 MG/1
5 TABLET ORAL EVERY 4 HOURS PRN
Status: DISCONTINUED | OUTPATIENT
Start: 2024-08-05 | End: 2024-08-05 | Stop reason: HOSPADM

## 2024-08-05 RX ORDER — POLYETHYLENE GLYCOL 3350 17 G/17G
17 POWDER, FOR SOLUTION ORAL DAILY
Status: DISCONTINUED | OUTPATIENT
Start: 2024-08-05 | End: 2024-08-08 | Stop reason: HOSPADM

## 2024-08-05 RX ORDER — AMOXICILLIN 250 MG
2 CAPSULE ORAL 2 TIMES DAILY
Status: DISCONTINUED | OUTPATIENT
Start: 2024-08-05 | End: 2024-08-08 | Stop reason: HOSPADM

## 2024-08-05 RX ORDER — NALOXONE HYDROCHLORIDE 0.4 MG/ML
0.2 INJECTION, SOLUTION INTRAMUSCULAR; INTRAVENOUS; SUBCUTANEOUS AS NEEDED
Status: DISCONTINUED | OUTPATIENT
Start: 2024-08-05 | End: 2024-08-06

## 2024-08-05 RX ORDER — LIDOCAINE HYDROCHLORIDE 20 MG/ML
INJECTION, SOLUTION INFILTRATION; PERINEURAL AS NEEDED
Status: DISCONTINUED | OUTPATIENT
Start: 2024-08-05 | End: 2024-08-05

## 2024-08-05 RX ORDER — ALBUTEROL SULFATE 0.83 MG/ML
2.5 SOLUTION RESPIRATORY (INHALATION) ONCE AS NEEDED
Status: DISCONTINUED | OUTPATIENT
Start: 2024-08-05 | End: 2024-08-05 | Stop reason: HOSPADM

## 2024-08-05 RX ORDER — ROSUVASTATIN CALCIUM 10 MG/1
10 TABLET, COATED ORAL DAILY
Status: DISCONTINUED | OUTPATIENT
Start: 2024-08-05 | End: 2024-08-08 | Stop reason: HOSPADM

## 2024-08-05 RX ORDER — MIDAZOLAM HYDROCHLORIDE 1 MG/ML
INJECTION INTRAMUSCULAR; INTRAVENOUS AS NEEDED
Status: DISCONTINUED | OUTPATIENT
Start: 2024-08-05 | End: 2024-08-05

## 2024-08-05 RX ORDER — POLYMYXIN B 500000 [USP'U]/1
INJECTION, POWDER, LYOPHILIZED, FOR SOLUTION INTRAMUSCULAR; INTRATHECAL; INTRAVENOUS; OPHTHALMIC AS NEEDED
Status: DISCONTINUED | OUTPATIENT
Start: 2024-08-05 | End: 2024-08-05 | Stop reason: HOSPADM

## 2024-08-05 RX ORDER — NALOXONE HYDROCHLORIDE 0.4 MG/ML
0.2 INJECTION, SOLUTION INTRAMUSCULAR; INTRAVENOUS; SUBCUTANEOUS EVERY 5 MIN PRN
Status: DISCONTINUED | OUTPATIENT
Start: 2024-08-05 | End: 2024-08-08 | Stop reason: HOSPADM

## 2024-08-05 RX ORDER — SODIUM CHLORIDE 0.9 G/100ML
IRRIGANT IRRIGATION AS NEEDED
Status: DISCONTINUED | OUTPATIENT
Start: 2024-08-05 | End: 2024-08-05 | Stop reason: HOSPADM

## 2024-08-05 RX ORDER — ACETAMINOPHEN 325 MG/1
975 TABLET ORAL ONCE
Status: COMPLETED | OUTPATIENT
Start: 2024-08-05 | End: 2024-08-05

## 2024-08-05 RX ORDER — DEXTROSE 50 % IN WATER (D50W) INTRAVENOUS SYRINGE
12.5
Status: DISCONTINUED | OUTPATIENT
Start: 2024-08-05 | End: 2024-08-08 | Stop reason: HOSPADM

## 2024-08-05 RX ORDER — SODIUM CHLORIDE, SODIUM LACTATE, POTASSIUM CHLORIDE, CALCIUM CHLORIDE 600; 310; 30; 20 MG/100ML; MG/100ML; MG/100ML; MG/100ML
100 INJECTION, SOLUTION INTRAVENOUS CONTINUOUS
Status: DISCONTINUED | OUTPATIENT
Start: 2024-08-05 | End: 2024-08-05 | Stop reason: HOSPADM

## 2024-08-05 RX ORDER — HYDROMORPHONE HCL/0.9% NACL/PF 15 MG/30ML
PATIENT CONTROLLED ANALGESIA SYRINGE INTRAVENOUS CONTINUOUS
Status: DISCONTINUED | OUTPATIENT
Start: 2024-08-05 | End: 2024-08-06

## 2024-08-05 RX ORDER — BACITRACIN 500 [USP'U]/G
OINTMENT TOPICAL AS NEEDED
Status: DISCONTINUED | OUTPATIENT
Start: 2024-08-05 | End: 2024-08-05 | Stop reason: HOSPADM

## 2024-08-05 RX ORDER — PHENYLEPHRINE HCL IN 0.9% NACL 0.4MG/10ML
SYRINGE (ML) INTRAVENOUS AS NEEDED
Status: DISCONTINUED | OUTPATIENT
Start: 2024-08-05 | End: 2024-08-05

## 2024-08-05 RX ORDER — CEFAZOLIN 1 G/1
INJECTION, POWDER, FOR SOLUTION INTRAVENOUS AS NEEDED
Status: DISCONTINUED | OUTPATIENT
Start: 2024-08-05 | End: 2024-08-05

## 2024-08-05 RX ORDER — HYDRALAZINE HYDROCHLORIDE 20 MG/ML
5 INJECTION INTRAMUSCULAR; INTRAVENOUS EVERY 30 MIN PRN
Status: DISCONTINUED | OUTPATIENT
Start: 2024-08-05 | End: 2024-08-05 | Stop reason: HOSPADM

## 2024-08-05 RX ORDER — ONDANSETRON HYDROCHLORIDE 2 MG/ML
4 INJECTION, SOLUTION INTRAVENOUS ONCE AS NEEDED
Status: DISCONTINUED | OUTPATIENT
Start: 2024-08-05 | End: 2024-08-05 | Stop reason: HOSPADM

## 2024-08-05 RX ORDER — ONDANSETRON HYDROCHLORIDE 2 MG/ML
INJECTION, SOLUTION INTRAVENOUS AS NEEDED
Status: DISCONTINUED | OUTPATIENT
Start: 2024-08-05 | End: 2024-08-05

## 2024-08-05 RX ORDER — HYDROMORPHONE HYDROCHLORIDE 1 MG/ML
0.5 INJECTION, SOLUTION INTRAMUSCULAR; INTRAVENOUS; SUBCUTANEOUS EVERY 5 MIN PRN
Status: DISCONTINUED | OUTPATIENT
Start: 2024-08-05 | End: 2024-08-05 | Stop reason: HOSPADM

## 2024-08-05 RX ORDER — FENTANYL CITRATE 50 UG/ML
INJECTION, SOLUTION INTRAMUSCULAR; INTRAVENOUS AS NEEDED
Status: DISCONTINUED | OUTPATIENT
Start: 2024-08-05 | End: 2024-08-05

## 2024-08-05 RX ORDER — DIPHENHYDRAMINE HYDROCHLORIDE 50 MG/ML
12.5 INJECTION INTRAMUSCULAR; INTRAVENOUS EVERY 6 HOURS PRN
Status: DISCONTINUED | OUTPATIENT
Start: 2024-08-05 | End: 2024-08-08 | Stop reason: HOSPADM

## 2024-08-05 RX ORDER — SODIUM CHLORIDE, SODIUM LACTATE, POTASSIUM CHLORIDE, CALCIUM CHLORIDE 600; 310; 30; 20 MG/100ML; MG/100ML; MG/100ML; MG/100ML
100 INJECTION, SOLUTION INTRAVENOUS CONTINUOUS
Status: DISCONTINUED | OUTPATIENT
Start: 2024-08-05 | End: 2024-08-08 | Stop reason: HOSPADM

## 2024-08-05 SDOH — SOCIAL STABILITY: SOCIAL INSECURITY: DOES ANYONE TRY TO KEEP YOU FROM HAVING/CONTACTING OTHER FRIENDS OR DOING THINGS OUTSIDE YOUR HOME?: NO

## 2024-08-05 SDOH — SOCIAL STABILITY: SOCIAL INSECURITY: DO YOU FEEL ANYONE HAS EXPLOITED OR TAKEN ADVANTAGE OF YOU FINANCIALLY OR OF YOUR PERSONAL PROPERTY?: NO

## 2024-08-05 SDOH — HEALTH STABILITY: MENTAL HEALTH: CURRENT SMOKER: 0

## 2024-08-05 SDOH — SOCIAL STABILITY: SOCIAL INSECURITY: HAVE YOU HAD THOUGHTS OF HARMING ANYONE ELSE?: NO

## 2024-08-05 SDOH — SOCIAL STABILITY: SOCIAL INSECURITY: HAVE YOU HAD ANY THOUGHTS OF HARMING ANYONE ELSE?: NO

## 2024-08-05 SDOH — SOCIAL STABILITY: SOCIAL INSECURITY: ARE YOU OR HAVE YOU BEEN THREATENED OR ABUSED PHYSICALLY, EMOTIONALLY, OR SEXUALLY BY ANYONE?: NO

## 2024-08-05 SDOH — SOCIAL STABILITY: SOCIAL INSECURITY: ARE THERE ANY APPARENT SIGNS OF INJURIES/BEHAVIORS THAT COULD BE RELATED TO ABUSE/NEGLECT?: NO

## 2024-08-05 SDOH — SOCIAL STABILITY: SOCIAL INSECURITY: DO YOU FEEL UNSAFE GOING BACK TO THE PLACE WHERE YOU ARE LIVING?: NO

## 2024-08-05 SDOH — SOCIAL STABILITY: SOCIAL INSECURITY: ABUSE: ADULT

## 2024-08-05 SDOH — SOCIAL STABILITY: SOCIAL INSECURITY: HAS ANYONE EVER THREATENED TO HURT YOUR FAMILY OR YOUR PETS?: NO

## 2024-08-05 ASSESSMENT — ACTIVITIES OF DAILY LIVING (ADL)
HEARING - RIGHT EAR: FUNCTIONAL
JUDGMENT_ADEQUATE_SAFELY_COMPLETE_DAILY_ACTIVITIES: YES
PATIENT'S MEMORY ADEQUATE TO SAFELY COMPLETE DAILY ACTIVITIES?: YES
DRESSING YOURSELF: INDEPENDENT
TOILETING: INDEPENDENT
FEEDING YOURSELF: INDEPENDENT
GROOMING: INDEPENDENT
WALKS IN HOME: INDEPENDENT
ADEQUATE_TO_COMPLETE_ADL: YES
HEARING - LEFT EAR: FUNCTIONAL
ASSISTIVE_DEVICE: EYEGLASSES
LACK_OF_TRANSPORTATION: NO
BATHING: INDEPENDENT

## 2024-08-05 ASSESSMENT — COGNITIVE AND FUNCTIONAL STATUS - GENERAL
DAILY ACTIVITIY SCORE: 24
PATIENT BASELINE BEDBOUND: NO
MOBILITY SCORE: 24

## 2024-08-05 ASSESSMENT — PATIENT HEALTH QUESTIONNAIRE - PHQ9
1. LITTLE INTEREST OR PLEASURE IN DOING THINGS: SEVERAL DAYS
2. FEELING DOWN, DEPRESSED OR HOPELESS: SEVERAL DAYS
SUM OF ALL RESPONSES TO PHQ9 QUESTIONS 1 & 2: 2

## 2024-08-05 ASSESSMENT — PAIN - FUNCTIONAL ASSESSMENT
PAIN_FUNCTIONAL_ASSESSMENT: 0-10
PAIN_FUNCTIONAL_ASSESSMENT: UNABLE TO SELF-REPORT
PAIN_FUNCTIONAL_ASSESSMENT: 0-10
PAIN_FUNCTIONAL_ASSESSMENT: UNABLE TO SELF-REPORT
PAIN_FUNCTIONAL_ASSESSMENT: 0-10

## 2024-08-05 ASSESSMENT — PAIN SCALES - GENERAL
PAINLEVEL_OUTOF10: 8
PAINLEVEL_OUTOF10: 0 - NO PAIN
PAINLEVEL_OUTOF10: 6
PAINLEVEL_OUTOF10: 7
PAINLEVEL_OUTOF10: 8
PAINLEVEL_OUTOF10: 6
PAINLEVEL_OUTOF10: 7
PAINLEVEL_OUTOF10: 8
PAINLEVEL_OUTOF10: 7
PAINLEVEL_OUTOF10: 8
PAINLEVEL_OUTOF10: 7

## 2024-08-05 ASSESSMENT — COLUMBIA-SUICIDE SEVERITY RATING SCALE - C-SSRS
1. IN THE PAST MONTH, HAVE YOU WISHED YOU WERE DEAD OR WISHED YOU COULD GO TO SLEEP AND NOT WAKE UP?: NO
6. HAVE YOU EVER DONE ANYTHING, STARTED TO DO ANYTHING, OR PREPARED TO DO ANYTHING TO END YOUR LIFE?: NO
2. HAVE YOU ACTUALLY HAD ANY THOUGHTS OF KILLING YOURSELF?: NO

## 2024-08-05 ASSESSMENT — LIFESTYLE VARIABLES
SKIP TO QUESTIONS 9-10: 1
HOW OFTEN DO YOU HAVE A DRINK CONTAINING ALCOHOL: 2-4 TIMES A MONTH
AUDIT-C TOTAL SCORE: 2
AUDIT-C TOTAL SCORE: 2
HOW OFTEN DO YOU HAVE 6 OR MORE DRINKS ON ONE OCCASION: NEVER
HOW MANY STANDARD DRINKS CONTAINING ALCOHOL DO YOU HAVE ON A TYPICAL DAY: 1 OR 2

## 2024-08-05 ASSESSMENT — PAIN DESCRIPTION - LOCATION: LOCATION: BACK

## 2024-08-05 NOTE — DISCHARGE INSTR - OTHER ORDERS
Wound Care  -Wound site: Lateral/Flank, Back (Lumbar Spine)  -Wound Type: Surgical Incision  -Change the dressings daily and continue until the incisions are no longer draining.          Once the incisions have been dry for 48 hours, you may leave the dressings off and the site open to air.  -Cover the wound with an abdominal pad and secure it with paper tape around the edges.  -If there are sutures in your incisions, they will be removed at an appt. in 10-14 days  -No Lotions or Creams on or around the incision sites.  -No tub soaks  -You may use heat or ice to your incision sites and or back as         needed for comfort.      **No NSAIDS (Advil, Ibuprofen, Alevel, Etc.) for 3 months, they may interfere with the healing of the fusion.

## 2024-08-05 NOTE — ANESTHESIA PROCEDURE NOTES
Airway  Date/Time: 8/5/2024 7:47 AM  Urgency: elective      Staffing  Performed: [facility]   Authorized by: Ethan Braun MD    Performed by: Garrett Albarado RN  Patient location during procedure: OR    Indications and Patient Condition  Indications for airway management: anesthesia and airway protection  Sedation level: deep  Preoxygenated: no  Patient position: ramp      Final Airway Details  Final airway type: endotracheal airway      Successful airway: ETT     Successful intubation technique: direct laryngoscopy  Blade: Awa  Blade size: #4  ETT size (mm): 7.5  Cormack-Lehane Classification: grade III - view of epiglottis only  Placement verified by: chest auscultation, bronchoscopy and capnometry   Measured from: gums  ETT to gums (cm): 22  Number of attempts at approach: 1

## 2024-08-05 NOTE — DISCHARGE INSTR - DIET
Resume a diabetic/carbohydrate count diet.       -75 gram carbohydrate meal, 45 gram carbohydrate snack, 7448-3547 calories/day.

## 2024-08-05 NOTE — H&P
[facility] Department of Orthopaedic Surgery   Surgical History & Physical <30 Days  8/5/2024    Reason for Surgery: bilateral lumbar radiculopathy, right greater than left with weakness  Planned Procedure: L2-3 decompression w extension of his fusion proximally to L2    History & Physical Reviewed:  I have reviewed the History and Physical dated 7/31/2024. Relevant findings and updates are noted below:  No significant changes.    Home medications were reviewed with significant updates noted below:  No significant changes.    ERAS patient?: No    COVID-19 Risk Consent:   Surgeon has reviewed the key risks related to lolita COVID-19 and subsequent sequelae.     08/05/24 at 5:40 AM - Fidelia Boggs MD  Orthopaedic Surgery, PGY2

## 2024-08-05 NOTE — DISCHARGE INSTRUCTIONS
**Please call Kylah Hale RN (at [phone]) for any          postoperative questions or concerns.

## 2024-08-05 NOTE — ANESTHESIA PREPROCEDURE EVALUATION
Patient: Cj Vargas    Procedure Information       Date/Time: 08/05/24 0715    Procedure: Fusion Spine Lateral Interbody Lumbar (Spine Lumbar) - 1) lateral L2-3 discectomy/fusion 14317, [id number] [employer] [place] cages 2) L2-3 decompression/fusion; explore fusion mass  13792, 79095 62119, 76763  [employer]tronic Solera pedicle screws 4 hrs  M48.062    Location: [facility] OR 07 / [facility] OR    Surgeons: Chin Morris MD            Relevant Problems   Cardiac   (+) Benign essential hypertension   (+) Essential familial hyperlipidemia      Pulmonary   (+) Acute pulmonary embolism, unspecified pulmonary embolism type, unspecified whether acute cor pulmonale present (Multi)   (+) Obstructive sleep apnea syndrome in adult   (+) Pulmonary embolism (Multi)      Neuro   (+) Panic disorder   (+) Peripheral neuropathy      GI   (+) Acid reflux      /Renal   (+) BPH without urinary obstruction      Endocrine   (+) Morbid obesity (Multi)      Hematology   (+) Anemia   (+) Anticoagulant long-term use      Musculoskeletal   (+) Lumbar spinal stenosis   (+) Osteoarthritis of both knees   (+) Osteoarthritis of right knee   (+) Spinal stenosis, lumbar region with neurogenic claudication   (+) Spinal stenosis, lumbar region, without neurogenic claudication       Clinical information reviewed:   Tobacco  Allergies  Meds   Med Hx  Surg Hx   Fam Hx  Soc Hx        NPO Detail:  NPO/Void Status  Carbohydrate Drink Given Prior to Surgery? : N  Date of Last Liquid: 08/05/24  Time of Last Liquid: 0400  Date of Last Solid: 08/04/24  Time of Last Solid: 2000  Last Intake Type: Clear fluids  Time of Last Void: 0400         Physical Exam    Airway  Mallampati: II  TM distance: >3 FB  Neck ROM: full     Cardiovascular - normal exam     Dental    Pulmonary - normal exam     Abdominal - normal exam             Anesthesia Plan    History of general anesthesia?: yes  History of complications of general anesthesia?: no    ASA 3      general     The patient is not a current smoker.  Patient was not previously instructed to abstain from smoking on day of procedure.  Patient did not smoke on day of procedure.    Anesthetic plan and risks discussed with patient.  Use of blood products discussed with patient who consented to blood products.    Plan discussed with CRNA.

## 2024-08-05 NOTE — ANESTHESIA POSTPROCEDURE EVALUATION
Patient: Cj Vargas    Procedure Summary       Date: 08/05/24 Room / Location: [facility] OR 07 / Virtual [facility] OR    Anesthesia Start: 0736 Anesthesia Stop:     Procedure: Fusion Spine Lateral Interbody Lumbar (Spine Lumbar) Diagnosis:       Spinal stenosis, lumbar region with neurogenic claudication      (Spinal stenosis, lumbar region with neurogenic claudication [M48.062])    Surgeons: Chin Morris MD Responsible Provider: Ethan Braun MD    Anesthesia Type: general ASA Status: 3            Anesthesia Type: general    Vitals Value Taken Time   /79 08/05/24 1244   Temp 36 08/05/24 1244   Pulse 70 08/05/24 1244   Resp 15 08/05/24 1244   SpO2 100 08/05/24 1244       Anesthesia Post Evaluation    Patient location during evaluation: PACU  Patient participation: complete - patient participated  Level of consciousness: awake and alert  Pain management: adequate  Airway patency: patent  Cardiovascular status: acceptable  Respiratory status: acceptable  Hydration status: acceptable  Postoperative Nausea and Vomiting: none        There were no known notable events for this encounter.

## 2024-08-05 NOTE — OP NOTE
Fusion Spine Lateral Interbody Lumbar Operative Note     Date: 2024  OR Location: [facility] OR    Name: Cj Vargas, : 1960, Age: 63 y.o., MRN: [id number], Sex: male    Diagnosis  Pre-op Diagnosis      * Spinal stenosis, lumbar region with neurogenic claudication [M48.062] Post-op Diagnosis     * Spinal stenosis, lumbar region with neurogenic claudication [M48.062]     Procedures  Attempted Lateral L2-3 Discectomy and Fusion  Posterior L2-3 Decompression and Fusion  Explore Fusion Mass  Surgical Navigation  Surgeons      * Chin Morris - Primary    Resident/Fellow/Other Assistant:  Daisy Dawson MD    Procedure Summary  Anesthesia: General  ASA: III  Anesthesia Staff: Anesthesiologist: Ethan Braun MD  CRNA: KINGA Ascencio-CRNA  SRNA: Garrett Albarado RN  Estimated Blood Loss: 30 mL  Intra-op Medications:   Administrations occurring from 0715 to 1200 on 24:   Medication Name Total Dose   polymyxin B injection 500 Units   sodium chloride 0.9 % irrigation solution 1,000 mL   gelatin absorbable (Gelfoam) 100 sponge 1 each   thrombin (recombinant) (Recothrom) topical solution 5,000 Units              Anesthesia Record               Intraprocedure I/O Totals          Intake    lactated Ringer's 2300.00 mL    Total Intake 2300 mL          Specimen: No specimens collected     Staff:   Circulator: Jona  Circulator: Debby Enriquezub Person: Chio Enriquezub Person: Aldair Ochoa Circulator: Vivian  Scrub Person: Debby         Drains and/or Catheters:   Closed/Suction Drain Inferior;Midline Back Accordion (Active)   Site Description Healing 24 1240   Dressing Status Clean;Dry 24 1240   Status To bulb suction 24 1240   Output (mL) 0 mL 24 1300       Tourniquet Times:         Implants:  Implants       Type Name Action Serial No.      Implant BONE, PUTTY DBX  10CC DE-MINERAL ASEPTIC - [id number] Implanted      Graft BONE CHIPS,  CANCELL 30CC 1.7-10MM - [id number]  Implanted      Spinal Hardware SCREW, SOLERA 5.5 MAS, 7.5X50 - [id number] Implanted      Spinal Hardware COLBY, 5.5 X 80 CVD TI SOLERA - [id number] Implanted      Screw SET SCREW, 5.5, TI NS BRK OFF - [id number] Implanted               Clinical note: This 63-year-old man has developed disabling lumbar radiculopathy.  He has had a prior, successful L3-4 decompression and fusion.  Conservative measures of failed to relieve his symptoms.  Currently he presents for surgery in the form of a lateral interbody fusion and a posterior decompression and fusion.  The rationale for the above-noted procedures have been discussed at length does have the risks benefits expectations limitations alternatives and potential complications.  He understands and wishes to proceed.    The patient was brought to the operating room.  A huddle was held, he was identified, antibiotics administered, sequential compression devices placed.  A general anesthetic was superior.  He is carefully placed in the right lateral decubitus position with all body prominences well-padded.  His left flank was prepped and draped in a sterile fashion.  A small 3 cm incision was made and carried down sharply through skin and subcutaneous tissue.  The muscular layers were carefully dissected bluntly in line with the skin incision.  The transversalis fascia was entered and the retroperitoneal space was identified.  We attempted to mobilize the psoas muscle safely in a blunt fashion only.  It became obvious that given the presence of the lower ribs, that safe exposure through this approach was not possible.  I chose to abort this approach, because of concern about potential excessive bleeding or nerve stretch or psoas injury.  Meticulous hemostasis was obtained.  Wound was irrigated and the wound was closed in layers.  A dry sterile dressing was applied.  Carefully the patient was placed in the supine position and then transferred into the prone position on the  Ry frame with all bony prominences well-padded.  His back was prepped and draped in a sterile fashion.  His prior midline approach was used and carried down sharply through skin and subcutaneous tissue.  A careful subperiosteal dissection was carried out to the L2 transverse processes and his prior instrumentation.  The setscrews were removed and the rods were removed from L3-4.  The screws were very well-fixed.  The posterolateral fusion mass was carefully identified and exposed.  A decompression was performed at L2.  The spinous process of L2 was trimmed and the lamina thinned with a Leksell rongeur and a high-speed bur.  A plane was created between the underlying dura and the overlying bone and ligamentum flavum.  A thorough central and lateral recess decompression was performed.  The inferior facets of L2 were taken down completely.  Thorough foraminotomies were performed.  At the completion of the decompression all neural elements were quite free.  The local bone was cleaned of all soft tissue and morselized with allograft chips and a bone mill.  The transverse processes and the fusion mass were decorticated with a high-speed bur and the bone graft mixture along with demineralized bone matrix was packed out liberally on either side.  The O-arm was brought in.  We obtained surgical navigation guidance to place pedicle screws bilaterally at L2.  The usual sequence of drilling and entry point while avoiding the facet joint was performed and then pedicle finding and tapping performed.  7.5 millimeter screws were placed with excellent purchase.  Rods were secured to the screws and tightened.  Meticulous hemostasis obtained.  The wound had been frequently irrigated.  A Hemovac drain was placed deep to the fascia.  The deep fascia was closed with interrupted #1 Vicryl's, the subcutaneous tissue with interrupted 2-0 Vicryl's and the skin with interrupted nylon sutures.  A dry sterile dressing was applied.  He was  carefully placed in the supine position on his hospital bed, awakened and extubated and transferred to the recovery room in stable condition.    ** Dictated with voice recognition software and not immediately reviewed for errors in grammar and/or spelling **    Attending Attestation: I was present and scrubbed for the entire procedure.    Chin Morris  Phone Number: [phone]

## 2024-08-05 NOTE — CARE PLAN
The clinical goals for the shift included pain management and fall prevention.    Problem: Pain - Adult  Goal: Verbalizes/displays adequate comfort level or baseline comfort level  Outcome: Progressing     Problem: Safety - Adult  Goal: Free from fall injury  Outcome: Progressing     Problem: Discharge Planning  Goal: Discharge to home or other facility with appropriate resources  Outcome: Progressing     Problem: Chronic Conditions and Co-morbidities  Goal: Patient's chronic conditions and co-morbidity symptoms are monitored and maintained or improved  Outcome: Progressing     Problem: Pain  Goal: Takes deep breaths with improved pain control throughout the shift  Outcome: Met  Goal: Turns in bed with improved pain control throughout the shift  Outcome: Met  Goal: Walks with improved pain control throughout the shift  Outcome: Met  Goal: Performs ADL's with improved pain control throughout shift  Outcome: Met  Goal: Free from opioid side effects throughout the shift  Outcome: Met  Goal: Free from acute confusion related to pain meds throughout the shift  Outcome: Met     Problem: Fall/Injury  Goal: Not fall by end of shift  Outcome: Met  Goal: Be free from injury by end of the shift  Outcome: Met  Goal: Use assistive devices by end of the shift  Outcome: Met  Goal: Pace activities to prevent fatigue by end of the shift  Outcome: Met

## 2024-08-06 LAB — GLUCOSE BLD MANUAL STRIP-MCNC: 136 MG/DL (ref 74–99)

## 2024-08-06 PROCEDURE — 2500000005 HC RX 250 GENERAL PHARMACY W/O HCPCS: Performed by: STUDENT IN AN ORGANIZED HEALTH CARE EDUCATION/TRAINING PROGRAM

## 2024-08-06 PROCEDURE — 97530 THERAPEUTIC ACTIVITIES: CPT | Mod: GP

## 2024-08-06 PROCEDURE — 97165 OT EVAL LOW COMPLEX 30 MIN: CPT | Mod: GO | Performed by: OCCUPATIONAL THERAPIST

## 2024-08-06 PROCEDURE — 2500000004 HC RX 250 GENERAL PHARMACY W/ HCPCS (ALT 636 FOR OP/ED)

## 2024-08-06 PROCEDURE — 2500000005 HC RX 250 GENERAL PHARMACY W/O HCPCS

## 2024-08-06 PROCEDURE — 2500000002 HC RX 250 W HCPCS SELF ADMINISTERED DRUGS (ALT 637 FOR MEDICARE OP, ALT 636 FOR OP/ED): Performed by: STUDENT IN AN ORGANIZED HEALTH CARE EDUCATION/TRAINING PROGRAM

## 2024-08-06 PROCEDURE — 97161 PT EVAL LOW COMPLEX 20 MIN: CPT | Mod: GP

## 2024-08-06 PROCEDURE — 82947 ASSAY GLUCOSE BLOOD QUANT: CPT

## 2024-08-06 PROCEDURE — 2500000001 HC RX 250 WO HCPCS SELF ADMINISTERED DRUGS (ALT 637 FOR MEDICARE OP): Performed by: STUDENT IN AN ORGANIZED HEALTH CARE EDUCATION/TRAINING PROGRAM

## 2024-08-06 PROCEDURE — 2500000001 HC RX 250 WO HCPCS SELF ADMINISTERED DRUGS (ALT 637 FOR MEDICARE OP)

## 2024-08-06 PROCEDURE — 99223 1ST HOSP IP/OBS HIGH 75: CPT

## 2024-08-06 PROCEDURE — 2500000004 HC RX 250 GENERAL PHARMACY W/ HCPCS (ALT 636 FOR OP/ED): Performed by: STUDENT IN AN ORGANIZED HEALTH CARE EDUCATION/TRAINING PROGRAM

## 2024-08-06 PROCEDURE — 1100000001 HC PRIVATE ROOM DAILY

## 2024-08-06 RX ORDER — MORPHINE SULFATE IN 0.9 % NACL 30 MG/30ML
PATIENT CONTROLLED ANALGESIA SYRINGE INTRAVENOUS CONTINUOUS
Status: DISCONTINUED | OUTPATIENT
Start: 2024-08-06 | End: 2024-08-06

## 2024-08-06 RX ORDER — METHOCARBAMOL 500 MG/1
1000 TABLET, FILM COATED ORAL EVERY 8 HOURS
Qty: 60 TABLET | Refills: 0 | Status: SHIPPED | OUTPATIENT
Start: 2024-08-06 | End: 2024-08-18

## 2024-08-06 RX ORDER — MAGNESIUM SULFATE 1 G/100ML
1 INJECTION INTRAVENOUS EVERY 8 HOURS
Status: COMPLETED | OUTPATIENT
Start: 2024-08-06 | End: 2024-08-07

## 2024-08-06 RX ORDER — POLYETHYLENE GLYCOL 3350 17 G/17G
17 POWDER, FOR SOLUTION ORAL 2 TIMES DAILY PRN
Qty: 510 G | Refills: 0 | Status: SHIPPED | OUTPATIENT
Start: 2024-08-06

## 2024-08-06 RX ORDER — ACETAMINOPHEN 500 MG
1000 TABLET ORAL EVERY 8 HOURS
Qty: 60 TABLET | Refills: 0 | Status: SHIPPED | OUTPATIENT
Start: 2024-08-06 | End: 2024-08-18

## 2024-08-06 RX ORDER — LIDOCAINE 560 MG/1
1 PATCH PERCUTANEOUS; TOPICAL; TRANSDERMAL EVERY 24 HOURS
Status: DISCONTINUED | OUTPATIENT
Start: 2024-08-06 | End: 2024-08-08 | Stop reason: HOSPADM

## 2024-08-06 RX ORDER — HYDROMORPHONE HCL/0.9% NACL/PF 15 MG/30ML
PATIENT CONTROLLED ANALGESIA SYRINGE INTRAVENOUS CONTINUOUS
Status: DISCONTINUED | OUTPATIENT
Start: 2024-08-06 | End: 2024-08-06

## 2024-08-06 RX ORDER — ENOXAPARIN SODIUM 150 MG/ML
120 INJECTION SUBCUTANEOUS EVERY 12 HOURS
Qty: 6 EACH | Refills: 0 | Status: SHIPPED | OUTPATIENT
Start: 2024-08-06

## 2024-08-06 RX ORDER — HYDROMORPHONE HCL/0.9% NACL/PF 15 MG/30ML
PATIENT CONTROLLED ANALGESIA SYRINGE INTRAVENOUS CONTINUOUS
Status: DISCONTINUED | OUTPATIENT
Start: 2024-08-06 | End: 2024-08-07

## 2024-08-06 RX ORDER — NALOXONE HYDROCHLORIDE 0.4 MG/ML
0.2 INJECTION, SOLUTION INTRAMUSCULAR; INTRAVENOUS; SUBCUTANEOUS AS NEEDED
Status: DISCONTINUED | OUTPATIENT
Start: 2024-08-06 | End: 2024-08-08 | Stop reason: HOSPADM

## 2024-08-06 RX ORDER — NALOXONE HYDROCHLORIDE 0.4 MG/ML
0.2 INJECTION, SOLUTION INTRAMUSCULAR; INTRAVENOUS; SUBCUTANEOUS AS NEEDED
Status: DISCONTINUED | OUTPATIENT
Start: 2024-08-06 | End: 2024-08-06

## 2024-08-06 RX ORDER — CALCIUM CARBONATE 200(500)MG
500 TABLET,CHEWABLE ORAL 2 TIMES DAILY
Status: DISCONTINUED | OUTPATIENT
Start: 2024-08-06 | End: 2024-08-08 | Stop reason: HOSPADM

## 2024-08-06 RX ORDER — OXYCODONE HYDROCHLORIDE 5 MG/1
5 TABLET ORAL EVERY 4 HOURS PRN
Qty: 28 TABLET | Refills: 0 | Status: SHIPPED | OUTPATIENT
Start: 2024-08-06 | End: 2024-08-08 | Stop reason: HOSPADM

## 2024-08-06 ASSESSMENT — COGNITIVE AND FUNCTIONAL STATUS - GENERAL
TOILETING: A LITTLE
WALKING IN HOSPITAL ROOM: A LITTLE
MOVING TO AND FROM BED TO CHAIR: A LITTLE
DAILY ACTIVITIY SCORE: 19
DRESSING REGULAR UPPER BODY CLOTHING: A LITTLE
CLIMB 3 TO 5 STEPS WITH RAILING: A LITTLE
MOBILITY SCORE: 23
HELP NEEDED FOR BATHING: A LITTLE
MOBILITY SCORE: 18
STANDING UP FROM CHAIR USING ARMS: A LITTLE
TURNING FROM BACK TO SIDE WHILE IN FLAT BAD: A LITTLE
CLIMB 3 TO 5 STEPS WITH RAILING: A LOT
DRESSING REGULAR LOWER BODY CLOTHING: A LOT

## 2024-08-06 ASSESSMENT — PAIN SCALES - GENERAL
PAINLEVEL_OUTOF10: 6
PAINLEVEL_OUTOF10: 7
PAINLEVEL_OUTOF10: 5 - MODERATE PAIN
PAINLEVEL_OUTOF10: 8

## 2024-08-06 ASSESSMENT — PAIN - FUNCTIONAL ASSESSMENT
PAIN_FUNCTIONAL_ASSESSMENT: 0-10
PAIN_FUNCTIONAL_ASSESSMENT: 0-10

## 2024-08-06 ASSESSMENT — ACTIVITIES OF DAILY LIVING (ADL)
ADL_ASSISTANCE: INDEPENDENT
BATHING_ASSISTANCE: MINIMAL
ADL_ASSISTANCE: INDEPENDENT

## 2024-08-06 ASSESSMENT — PAIN DESCRIPTION - DESCRIPTORS: DESCRIPTORS: BURNING;SHOOTING

## 2024-08-06 NOTE — CONSULTS
Consults  Acute Pain Service  Cj Vargas is a 63 y.o. year old male patient who is POD 1 from posterior fusion with Dr Morris. Acute pain consulted for management of uncontrolled postop pain.    No Known Allergies     Review of Systems  Gen: No fatigue, anorexia, insomnia, fever.   Eyes: No vision loss, double vision, drainage, eye pain.   ENT: No pharyngitis, dry mouth, no hearing changes or ear discharge  Cardiac: No chest pain, palpitations, syncope, near syncope.   Pulmonary: No shortness of breath, cough, hemoptysis.   Heme/lymph: No swollen glands, fever, bleeding.   GI: No abdominal pain, change in bowel habits, melena, hematemesis, hematochezia, nausea, vomiting, diarrhea.   : No discharge, dysuria, frequency, urgency, hematuria.  Endo: No polyuria or weight loss.   Musculoskeletal: Negative for any pain or loss of ROM/weakness  Skin: No rashes or lesions  Neuro: Normal speech, no numbness or weakness. No gait difficulties  Review of systems is otherwise negative unless stated above or in history of present illness.    Physical Exam:  Constitutional:  no distress, alert and cooperative  Eyes: clear sclera  Head/Neck: No apparent injury, trachea midline  Respiratory/Thorax: Patent airways, thorax symmetric, breathing comfortably  Cardiovascular: no pitting edema  Gastrointestinal: Nondistended  Musculoskeletal: ROM intact  Extremities: no clubbing  Neurological: alert, summers x4  Psychological: Appropriate affect    Past Medical History:   Diagnosis Date    Anemia     Arthritis 2010    BPH (benign prostatic hyperplasia)     Cataract 2020    CPAP (continuous positive airway pressure) dependence 2000    Depression     GERD (gastroesophageal reflux disease)     Hyperlipidemia     Hypertension     Low back pain     Neuropathy     Obese     RADHA on CPAP     Panic disorder     Pulmonary embolism on long-term anticoagulation therapy (Multi) 2021    Eliquis    Recurrent deep vein thrombosis (DVT) (Multi)     Most  recently 10/2023    Spinal stenosis 2015        Past Surgical History:   Procedure Laterality Date    ANKLE Right 12/2021    arthroscopy    ANKLE Right 2023    athroscopy    APPENDECTOMY  1970    COLONOSCOPY      GASTRIC BYPASS  2017    sleeve gastrectomy    KNEE ARTHROSCOPY W/ DEBRIDEMENT Right 2018    LUMBAR FUSION      l3-4    TONSILLECTOMY  1974    TOTAL KNEE ARTHROPLASTY Right 11/28/2023    TOTAL KNEE ARTHROPLASTY Left 2013        Family History   Problem Relation Name Age of Onset    Emphysema Mother Mom     COPD Mother Mom     Lupus Mother Mom     Coronary artery disease Father Dad     Hyperlipidemia Father Dad     Hypertension Father Dad     Stroke Father Dad     Alcohol abuse Father Dad     Hearing loss Father Dad     Vision loss Father Dad     Atrial fibrillation Brother          Social History     Socioeconomic History    Marital status:      Spouse name: Not on file    Number of children: Not on file    Years of education: Not on file    Highest education level: Not on file   Occupational History    Not on file   Tobacco Use    Smoking status: Never     Passive exposure: Never    Smokeless tobacco: Never   Vaping Use    Vaping status: Never Used   Substance and Sexual Activity    Alcohol use: Yes     Alcohol/week: 3.0 standard drinks of alcohol     Types: 3 Glasses of wine per week    Drug use: Not Currently     Types: Marijuana     Comment: in college    Sexual activity: Defer   Other Topics Concern    Not on file   Social History Narrative    Not on file     Social Determinants of Health     Financial Resource Strain: Low Risk  (8/5/2024)    Overall Financial Resource Strain (CARDIA)     Difficulty of Paying Living Expenses: Not very hard   Food Insecurity: No Food Insecurity (11/25/2023)    Hunger Vital Sign     Worried About Running Out of Food in the Last Year: Never true     Ran Out of Food in the Last Year: Never true   Transportation Needs: No Transportation Needs (8/5/2024)    PRAPARE -  Transportation     Lack of Transportation (Medical): No     Lack of Transportation (Non-Medical): No   Physical Activity: Inactive (10/12/2023)    Exercise Vital Sign     Days of Exercise per Week: 0 days     Minutes of Exercise per Session: 0 min   Stress: Stress Concern Present (10/12/2023)    [place] [employer] - Occupational Stress Questionnaire     Feeling of Stress : To some extent   Social Connections: Feeling Socially Integrated (12/29/2023)    OASIS : Social Isolation     Frequency of experiencing loneliness or isolation: Never   Recent Concern: Social Connections - Moderately Isolated (11/25/2023)    Social Connection and Isolation Panel [NHANES]     Frequency of Communication with Friends and Family: More than three times a week     Frequency of Social Gatherings with Friends and Family: Three times a week     Attends Nondenominational Services: Never     Active Member of Clubs or Organizations: No     Attends Club or Organization Meetings: Never     Marital Status:    Intimate Partner Violence: Not At Risk (11/25/2023)    Humiliation, Afraid, Rape, and Kick questionnaire     Fear of Current or Ex-Partner: No     Emotionally Abused: No     Physically Abused: No     Sexually Abused: No   Housing Stability: Low Risk  (8/5/2024)    Housing Stability Vital Sign     Unable to Pay for Housing in the Last Year: No     Number of Times Moved in the Last Year: 0     Homeless in the Last Year: No      Recommendations:   - IV Mg 1g Q8H x 3 doses  - IV Toradol 30mg Q6H for 3 doses per surgical service  - IV Tylenol 1000mg Q6H, time 3 hours between tylenol and toradol  - c/w Robaxin 1000mg TID  - Lidocaine patches around affected site as needed  - Increase Dilaudid PCA to 0.2mg with 6 minute lockout period  - Continuous pulse ox  - Oral ketamine 25mg, hold if develops hallucinations     Acute Pain Resident  pg [phone] ph [phone]

## 2024-08-06 NOTE — PROGRESS NOTES
Orthopaedic Spine Surgery Progress Note    S:  No acute events overnight. Pain well controlled on PCA, no changes to chronic neurologic symptoms.    Denies chest pain, shortness of breath, or fevers. Voiding volitionally. Denies neurologic changes.    O:  /80 (Patient Position: Lying)   Pulse 75   Temp 35.7 °C (96.3 °F) (Temporal)   Resp 16   Ht 1.829 m (6')   Wt 136 kg (298 lb 15.1 oz)   SpO2 93%   BMI 40.54 kg/m²     Gen: arousable, NAD, appropriately conversational  Cardiac: RRR to peripheral palpation  Resp: nonlabored on RA  GI: soft, nondistended    MSK:  Spine Exam:  Postoperative dressings in place without strikethrough bleeding  Drain in place holding suction, serosanginous output    L1: SILT       L2: SILT      Hip flexors 3/5 Left limited by pain; 5/5 Right  L3: SILT      Knee extension 3/5 Left limited by pain; 5/5 Right  L4: SILT      Tib Ant. (Dorsiflexion) 5/5 Left; 4/5 Right  L5: SILT      EHL 5/5 Left; 5/5 Right  S1: SILT      Plantarflexion 5/5 Left; 5/5 Right    Labs:  No results found for this or any previous visit (from the past 24 hour(s)).    A/P: 63 y.o. male s/p L2-3 PSIF and decompression, extension of fusion to L2 on 8/5 with Dr. Morris.      Plan:  - Weightbearing status: activity ad kushal  - Precautions: no bending, twisting, lifting >10 lbs  - Imaging: none required  - Pain: multimodal regimen including Tylenol, oxycodone, Dilaudid, robaxin  - Dexamethasone 10 mg IV q8h x3 doses  - Perioperative antibiotics: Ancef 2g q8h x 48 hrs  - DVT prophylaxis: SCDs, ambulation; no chemoppx  - Dressing: maintain  - Drain: HV x 1, please record output every shift; anticipate removing after 48 hrs postop  - FEN: maintenance IV fluids; HLIV with good PO intake  - Diet: clear liquid diet, advance with bowel sounds  - Pulm: encourage IS, maintain O2 sat >92%  - Bowel Regimen  - PT/OT consult  - Daily CBC, BMP     Dispo: pending PT/OT    Fidelia Boggs, PGY-2  Orthopedic Surgery  Resident  Available via MIKESTAR    While admitted, this patient will be followed by the Ortho Spine Team. Please contact below residents with any questions (available via Epic Chat).     First call: Fidelia Boggs, PGY2  Second call: Tony Vasquez, PGY4    On weekends and after 6PM:  At [facility]: Please reach out to the orthopaedic on-call resident ([phone])  At [facility]: Please reach out to the orthopaedic on-call JASON or resident (please refer to Jil)

## 2024-08-06 NOTE — PROGRESS NOTES
Occupational Therapy    Evaluation    Patient Name: Cj Vargas  MRN: [id number]  Today's Date: 8/6/2024  Time Calculation  Start Time: 1245  Stop Time: 1259  Time Calculation (min): 14 min        Assessment:  OT Assessment: Pt presents to OT with increased falls risk, decreased safety and independence with functional transfers and mobility and impaired ADL performance.  Pt will continue to benefit from skilled OT services to address deficits and facilitate safe return to PLOF and home environment.   Prognosis: Excellent  Barriers to Discharge: None  Evaluation/Treatment Tolerance: Patient tolerated treatment well  Medical Staff Made Aware: Yes  End of Session Communication: Bedside nurse  End of Session Patient Position: Bed, 3 rail up, Alarm off, not on at start of session  OT Assessment Results: Decreased ADL status, Decreased safe judgment during ADL, Decreased IADLs, Decreased functional mobility  Prognosis: Excellent  Barriers to Discharge: None  Evaluation/Treatment Tolerance: Patient tolerated treatment well  Medical Staff Made Aware: Yes  Plan:  Treatment Interventions: ADL retraining, UE strengthening/ROM, Functional transfer training, Compensatory technique education, Equipment evaluation/education, Patient/family training  OT Frequency: 3 times per week  OT Discharge Recommendations: Low intensity level of continued care  OT Recommended Transfer Status: Assist of 1, Stand by assist  OT - OK to Discharge: Yes  Treatment Interventions: ADL retraining, UE strengthening/ROM, Functional transfer training, Compensatory technique education, Equipment evaluation/education, Patient/family training    Subjective   Current Problem:  1. Spinal stenosis, lumbar region, without neurogenic claudication        2. Spinal stenosis, lumbar region with neurogenic claudication          General:  General  Reason for Referral: s/p L2-L3 decompression and fusion to L2; attempted  lateral disectomy/fusion (8/5/24).  Past  Medical History Relevant to Rehab: HTN, HLD, RADHA, PE, Peripheral Neuropathy, Obesity, BPH, Panic Disorder, Anemia, CA, Spinal Stenosis  Family/Caregiver Present: Yes  Caregiver Feedback: wife present  Prior to Session Communication: Bedside nurse  Patient Position Received: Bed, 3 rail up, Alarm off, not on at start of session  General Comment: met sitting upright in bed, cooperative, willing to engage  Precautions:  LE Weight Bearing Status: Weight Bearing as Tolerated  Medical Precautions: Fall precautions  Post-Surgical Precautions: Spinal precautions     Pain:  Pain Assessment  Pain Assessment: 0-10  0-10 (Numeric) Pain Score: 5 - Moderate pain  Pain Interventions:  (PCA)  Response to Interventions: OT to tolerance    Objective   Cognition:  Overall Cognitive Status: Within Functional Limits  Orientation Level: Oriented X4  Cognition Comments: guarded however receptive  Impulsive: Mildly           Home Living:  Type of Home: House  Lives With: Spouse  Home Adaptive Equipment: Walker rolling or standard, Crutches  Home Layout: Bed/bath upstairs  Home Access: Stairs to enter with rails  Entrance Stairs-Number of Steps: 3  Bathroom Shower/Tub: Walk-in shower  Bathroom Equipment: Shower chair with back  Prior Function:  Level of [place]: Independent with ADLs and functional transfers, Independent with homemaking with ambulation  ADL Assistance: Independent  Homemaking Assistance: Independent  Ambulatory Assistance: Independent  Vocational: Full time employment  Hand Dominance: Right  Prior Function Comments: -falls  IADL History:  Current License: Yes  Mode of Transportation: Car  Occupation: Full time employment  Type of Occupation:   Leisure and Hobbies: hypnotist on weekends  ADL:  Eating Assistance: Independent  Grooming Assistance: Stand by  Grooming Deficit: Wash/dry hands  Bathing Assistance: Minimal (anticipated)  UE Dressing Assistance: Minimal (anticipated)  LE Dressing Assistance: Minimal  (anticipated- able to self report figure 4 adaptive technique however declined to demonstrate)  Toileting Assistance with Device: Minimal (anticipated)  Activity Tolerance:  Endurance: Endurance does not limit participation in activity  Bed Mobility/Transfers: Bed Mobility  Bed Mobility: Yes  Bed Mobility 1  Bed Mobility 1: Supine to sitting, Sitting to supine  Level of Assistance 1:  (SBA min cues)  Bed Mobility Comments 1: impulsive, was amenable to lower HOB slightly, cues for logroll    Transfers  Transfer: Yes  Transfer 1  Technique 1: Sit to stand, Stand to sit  Transfer Level of Assistance 1: Contact guard  Trials/Comments 1: mildly impulsive, from EOB      Functional Mobility:  Functional Mobility  Functional Mobility Performed: Yes  Functional Mobility 1  Surface 1: Level tile  Device 1: IV Pole  Assistance 1:  (SBA)  Comments 1: facilitated moderate household distances, within room and onto unit, with SBA and IV pole, decreased speed with c/o pain and fatigue, no acute LOB      Vision:Vision - Basic Assessment  Current Vision: No visual deficits  Sensation:  Light Touch:  (complains of generalized sensation deficits throughout body, decreased  with +dropping items at PLOF)  Strength:  Strength Comments: BUE appears >/=3+/5 as demo in function, not formally assessed 2/2 spine precautions- B grasp 4/5  Perception:  Inattention/Neglect: Appears intact  Initiation: Appears intact  Motor Planning: Appears intact  Perseveration: Not present  Coordination:  Movements are Fluid and Coordinated: Yes   Hand Function:  Gross Grasp: Functional  Coordination: Functional  Extremities: RUE   RUE : Within Functional Limits and LUE   LUE: Within Functional Limits    Outcome Measures:[facility] Daily Activity  Putting on and taking off regular lower body clothing: A lot  Bathing (including washing, rinsing, drying): A little  Putting on and taking off regular upper body clothing: A little  Toileting, which includes using  toilet, bedpan or urinal: A little  Taking care of personal grooming such as brushing teeth: None  Eating Meals: None  Daily Activity - Total Score: 19         and Brief Confusion Assessment Method (bCAM)  CAM Result: CAM -    Education Documentation  Precautions, taught by Niya Warren OT at 8/6/2024  1:24 PM.  Learner: Significant Other, Patient  Readiness: Acceptance  Method: Explanation  Response: Verbalizes Understanding    ADL Training, taught by Niya Warren OT at 8/6/2024  1:24 PM.  Learner: Significant Other, Patient  Readiness: Acceptance  Method: Explanation  Response: Verbalizes Understanding    Education Comments  No comments found.    Goals:  Encounter Problems       Encounter Problems (Active)       ADLs       Patient will demonstrate lower body dressing with modified independent level of assistancedonning and doffing all LE clothes  with PRN adaptive equipment       Start:  08/06/24    Expected End:  08/27/24            Patient will perform toileting tasks, including hygiene and clothing management with independent level of assistance       Start:  08/06/24    Expected End:  08/27/24               BALANCE          COGNITION/SAFETY       Patient will recall and adhere to spine precautions within all ADLs and functional mobility in order to promote healing and safety with functional tasks        Start:  08/06/24    Expected End:  08/27/24               MOBILITY       pt will safely demonstrate functional mobility, to/from bathroom and at other household distances, navigating around environmental barriers with LRD and mod I        Start:  08/06/24    Expected End:  08/27/24               TRANSFERS       Patient will perform bed mobility independently from HOB flat in order to improve safety and independence with mobility       Start:  08/06/24    Expected End:  08/27/24 08/06/24 at 1:38 PM   Niya Warren OT   Rehab Office: [phone]

## 2024-08-06 NOTE — PROGRESS NOTES
Physical Therapy    Physical Therapy Evaluation & Treatment    Patient Name: Cj Vargas  MRN: [id number]  Today's Date: 8/6/2024   Time Calculation  Start Time: 0931  Stop Time: 0956  Time Calculation (min): 25 min    Assessment/Plan   PT Assessment  PT Assessment Results: Decreased strength, Decreased endurance, Decreased range of motion  Rehab Prognosis: Good  Barriers to Discharge: Pain management  Evaluation/Treatment Tolerance: Patient tolerated treatment well  Medical Staff Made Aware: Yes  Strengths: Ability to acquire knowledge, Support of Caregivers  Barriers to Participation: Premorbid level of function  End of Session Communication: Bedside nurse, Physician  Assessment Comment: 63yr M patient presents s/p lumbar spinal decompession with extension and fusion to L2 (8/5/24). Patient is disgruntled with lack of pain medication recieved due to his pain levels. States he has numbness/tingling in BL UE and pain. Communicated with surgical team about patients pain levels and UE symptoms. During examination patient has LE dermatomal deficits in BL LE, decreased strength, decreased ROM, gait, functional mobility, and tolerance with activity. He would benefit from continued skilled PT while admitted to the hospital in order to maximize patient safety and increase functional mobility paired with tolerance for activity.  End of Session Patient Position: Bed, 2 rail up, Alarm on   IP OR SWING BED PT PLAN  Inpatient or Swing Bed: Inpatient  PT Plan  Treatment/Interventions: Bed mobility, Transfer training, Gait training, Stair training, Balance training, Neuromuscular re-education, Strengthening, Endurance training, Range of motion, Therapeutic exercise, Therapeutic activity, Home exercise program, Positioning  PT Plan: Ongoing PT  PT Frequency: Daily  PT Discharge Recommendations: Low intensity level of continued care  PT Recommended Transfer Status: Contact guard, Assistive device  PT - OK to Discharge:  Yes    Subjective   General Visit Information:  General  Reason for Referral: s/p L2-L3 decompression and fusion to L2; attempted  lateral disectomy/fusion (8/5/24).  Past Medical History Relevant to Rehab: HTN, HLD, RADHA, PE, Peripheral Neuropathy, Obesity, BPH, Panic Disorder, Anemia, CA, Spinal Stenosis  Family/Caregiver Present: No  Prior to Session Communication: Bedside nurse  Patient Position Received: Bed, 2 rail up, Alarm off, not on at start of session  General Comment: Patient is agreeable to particpate in therapy; disgruntled about his lack of pain relief  Home Living:  Home Living  Type of Home: House  Lives With: Spouse  Home Adaptive Equipment: Walker rolling or standard, Crutches  Home Layout: Multi-level, Stairs to alternate level with rails  Alternate Level Stairs-Number of Steps: 14 steps  Home Access: Stairs to enter with rails  Entrance Stairs-Number of Steps: 3  Bathroom Shower/Tub: Walk-in shower  Bathroom Toilet: Standard  Bathroom Equipment: None  Prior Level of Function:  Prior Function Per Pt/Caregiver Report  Level of [place]: Independent with ADLs and functional transfers  Receives Help From: Family  ADL Assistance: Independent  Homemaking Assistance: Independent  Ambulatory Assistance: Independent  Vocational: Full time employment (Works as an )  Prior Function Comments: Reports PLOF would be able to walk a mile in an airport  Precautions:  Precautions  LE Weight Bearing Status: Weight Bearing as Tolerated  Medical Precautions: Fall precautions, Spinal precautions  Post-Surgical Precautions: Spinal precautions  Precautions Comment: No BLT; No lifting > 10lbs; activity Ad Jayne  Vital Signs:  Vital Signs  Heart Rate: 83  Heart Rate Source: Monitor  SpO2: 94 %  BP: 153/79 (Post: 164/84 in seated position)  BP Location: Left arm  BP Method: Automatic  Patient Position: Lying    Objective   Pain:  Pain Assessment  Pain Assessment: 0-10  0-10 (Numeric) Pain Score: 8  Pain Location:   (Back, arm, neck, hands)  Pain Descriptors: Burning, Shooting (stinging)  Pain Interventions:  (Ice pack to neck at the end of the session)  Cognition:  Cognition  Overall Cognitive Status: Within Functional Limits  Orientation Level: Oriented X4    General Assessments:  General Observation  General Observation: Lumbar spinal incision appears intact; no seepage coming from pad covering incision. Seated AROM with Left hip flexion and knee flexion is limited.     Activity Tolerance  Endurance: Endurance does not limit participation in activity    Sensation  Light Touch: Partial deficits in the LLE, Partial deficits in the RLE (LE dermatomal testing reveals no light touch discrimination to L5, S1 nerve roots in BL LE feet, ankle, toes)    Perception  Inattention/Neglect: Appears intact  Initiation: Appears intact  Motor Planning: Appears intact  Perseveration: Not present    Postural Control  Postural Control: Within Functional Limits    Static Sitting Balance  Static Sitting-Balance Support: Right upper extremity supported, Left upper extremity supported  Static Sitting-Level of Assistance: Independent  Static Sitting-Comment/Number of Minutes: Sat at EOB unsupported  Dynamic Sitting Balance  Dynamic Sitting-Balance Support: Right upper extremity supported, Left upper extremity supported, Feet unsupported  Dynamic Sitting-Balance:  (AROM of LE)  Dynamic Sitting-Comments: IND with no back support    Static Standing Balance  Static Standing-Balance Support: Bilateral upper extremity supported  Static Standing-Level of Assistance: Contact guard  Static Standing-Comment/Number of Minutes: Walker  Dynamic Standing Balance  Dynamic Standing-Balance Support: Bilateral upper extremity supported  Dynamic Standing-Balance:  (Gait out of hospital room)  Dynamic Standing-Comments: Walker    Functional Assessments:  Bed Mobility  Bed Mobility: Yes  Bed Mobility 1  Bed Mobility 1: Supine to sitting  Level of Assistance 1: Minimal  verbal cues, Close supervision  Bed Mobility Comments 1: Instructed patient to perform a log roll; pt has HOB elevated to max position and swings BL LE OOB  Bed Mobility 2  Bed Mobility  2: Sitting to supine  Level of Assistance 2: Close supervision, Moderate verbal cues  Bed Mobility Comments 2: Instructed patient in log roll technique - pt has HOB elevated to max and sits in bed and then swings BL LE into long sit position in bed.    Transfers  Transfer: Yes  Transfer 1  Transfer From 1: Sit to, Stand to  Transfer to 1: Sit, Stand  Technique 1: Sit to stand, Stand to sit  Transfer Device 1: Walker  Transfer Level of Assistance 1: Contact guard, Minimal verbal cues  Trials/Comments 1: 3 trials    Ambulation/Gait Training  Ambulation/Gait Training Performed: Yes  Ambulation/Gait Training 1  Surface 1: Level tile  Device 1: Rolling walker  Assistance 1: Contact guard, Minimal verbal cues  Quality of Gait 1: Diminished heel strike, Decreased step length, Forward flexed posture  Comments/Distance (ft) 1: Ambulates out of hospital room for 100+ ft with [facility] at [facility]; VC for proper sequencing/positioning  Extremity/Trunk Assessments:  RLE   RLE : Exceptions to WFL  Strength RLE  RLE Overall Strength: Greater than or equal to 3/5 as evidenced by functional mobility  LLE   LLE : Exceptions to WFL  Strength LLE  LLE Overall Strength: Greater than or equal to 3/5 as evidenced by functional mobility    Treatments:  Bed Mobility  Bed Mobility: Yes  Bed Mobility 1  Bed Mobility 1: Supine to sitting  Level of Assistance 1: Minimal verbal cues, Close supervision  Bed Mobility Comments 1: Instructed patient to perform a log roll; pt has HOB elevated to max position and swings BL LE OOB  Bed Mobility 2  Bed Mobility  2: Sitting to supine  Level of Assistance 2: Close supervision, Moderate verbal cues  Bed Mobility Comments 2: Instructed patient in log roll technique - pt has HOB elevated to max and sits in bed and then swings BL LE  into long sit position in bed.  Transfers  Transfer: Yes  Transfer 1  Transfer From 1: Sit to, Stand to  Transfer to 1: Sit, Stand  Technique 1: Sit to stand, Stand to sit  Transfer Device 1: Walker  Transfer Level of Assistance 1: Contact guard, Minimal verbal cues  Trials/Comments 1: 3 trials    Ambulation/Gait Training  Ambulation/Gait Training Performed: Yes  Ambulation/Gait Training 1  Surface 1: Level tile  Device 1: Rolling walker  Assistance 1: Contact guard, Minimal verbal cues  Quality of Gait 1: Diminished heel strike, Decreased step length, Forward flexed posture  Comments/Distance (ft) 1: Ambulates out of hospital room for 100+ ft with FWRW at [facility]; VC for proper sequencing/positioning    Outcome Measures:  [facility] Basic Mobility  Turning from your back to your side while in a flat bed without using bedrails: None  Moving from lying on your back to sitting on the side of a flat bed without using bedrails: A little  Moving to and from bed to chair (including a wheelchair): A little  Standing up from a chair using your arms (e.g. wheelchair or bedside chair): A little  To walk in hospital room: A little  Climbing 3-5 steps with railing: A lot  Basic Mobility - Total Score: 18    Encounter Problems       Encounter Problems (Active)       Mobility       Gait  (Progressing)       Start:  08/06/24    Expected End:  08/20/24       Gait: Pt will ambulate 150ft with front wheeled walker at independent with no LOB.            Stairs (Progressing)       Start:  08/06/24    Expected End:  08/20/24       Stairs: pt will ascend/descend 4-6 steps in step to step pattern with proper gait mechanics and use of HR to promote functional mobility and improve functional performance.  (IND Assist level)             Bed Mobility  (Progressing)       Start:  08/06/24    Expected End:  08/20/24       Bed Mobility:  Patient will perform bed mobility at independent to improve functional mobility and maximize patient safety.                 PT Transfers       Transfer (Progressing)       Start:  08/06/24    Expected End:  08/20/24       Transfers: Patient will perform functional transfers at independent to improve functional mobility and maximize patient safety.                Pain          Pain - Adult          Strength        Strength: Pt will improve B LE Strength to >/= 4+/5 to increase functional performance, tolerance to activity, and enhancing pt safety to participate in ADLs and IADLs.                    Education Documentation  Handouts, taught by Leigh Ann Mcdonnell PT at 8/6/2024 11:01 AM.  Learner: Patient  Readiness: Acceptance  Method: Explanation, Demonstration, Handout  Response: Verbalizes Understanding    Precautions, taught by Leigh Ann Mcdonnell PT at 8/6/2024 11:01 AM.  Learner: Patient  Readiness: Acceptance  Method: Explanation, Demonstration, Handout  Response: Verbalizes Understanding    Body Mechanics, taught by Leigh Ann Mcdonnell PT at 8/6/2024 11:01 AM.  Learner: Patient  Readiness: Acceptance  Method: Explanation, Demonstration, Handout  Response: Verbalizes Understanding    Mobility Training, taught by Leigh Ann Mcdonnell PT at 8/6/2024 11:01 AM.  Learner: Patient  Readiness: Acceptance  Method: Explanation, Demonstration, Handout  Response: Verbalizes Understanding    Education Comments  No comments found.      08/06/24 at 11:04 AM - Leigh Ann Mcdonnell PT

## 2024-08-06 NOTE — HOSPITAL COURSE
63 year-old male who presented with bilateral lumbar radiculopathy. Patient is now s/p L2-3 PSIF w decompression on 8/5 by Dr. Morris. On the day of surgery, patient was identified in the pre-operative holding area and agreeable to proceed with surgery. Written consent was obtained.  Please see operative note for further details of this procedure. The surgery went without complications. Patient received 24 hours of sabino-operative antibiotics. Patient recovered in the PACU before transfer to a regular nursing floor. The patient was monitored as an inpatient postoperatively for drain output, therapy, and pain control. The drain was pulled when output was appropriately low. The patient had good pain control, was voiding, and was neurovascularly stable at the time of discharge. Patient will follow-up with Dr. Morris in 3-4 weeks for post-operative visit.

## 2024-08-06 NOTE — CARE PLAN
Problem: Pain - Adult  Goal: Verbalizes/displays adequate comfort level or baseline comfort level  Outcome: Progressing     Problem: Safety - Adult  Goal: Free from fall injury  Outcome: Progressing     Problem: Discharge Planning  Goal: Discharge to home or other facility with appropriate resources  Outcome: Progressing     Problem: Chronic Conditions and Co-morbidities  Goal: Patient's chronic conditions and co-morbidity symptoms are monitored and maintained or improved  Outcome: Progressing     Problem: Pain  Goal: Participates in PT with improved pain control throughout the shift  Outcome: Progressing     Problem: Fall/Injury  Goal: Verbalize understanding of personal risk factors for fall in the hospital  Outcome: Progressing  Goal: Verbalize understanding of risk factor reduction measures to prevent injury from fall in the home  Outcome: Progressing     Problem: Pain  Goal: STG - Patient will verbalize an acceptable level of pain  Outcome: Progressing   The patient's goals for the shift include      The clinical goals for the shift include Keep patient safe from injury throughout the shift

## 2024-08-07 ENCOUNTER — HOME HEALTH ADMISSION (OUTPATIENT)
Dept: HOME HEALTH SERVICES | Facility: HOME HEALTH | Age: 64
End: 2024-08-07
Payer: COMMERCIAL

## 2024-08-07 PROCEDURE — 99231 SBSQ HOSP IP/OBS SF/LOW 25: CPT | Performed by: STUDENT IN AN ORGANIZED HEALTH CARE EDUCATION/TRAINING PROGRAM

## 2024-08-07 PROCEDURE — 2500000004 HC RX 250 GENERAL PHARMACY W/ HCPCS (ALT 636 FOR OP/ED): Performed by: STUDENT IN AN ORGANIZED HEALTH CARE EDUCATION/TRAINING PROGRAM

## 2024-08-07 PROCEDURE — 2500000004 HC RX 250 GENERAL PHARMACY W/ HCPCS (ALT 636 FOR OP/ED)

## 2024-08-07 PROCEDURE — 97116 GAIT TRAINING THERAPY: CPT | Mod: GP,CQ

## 2024-08-07 PROCEDURE — 2500000001 HC RX 250 WO HCPCS SELF ADMINISTERED DRUGS (ALT 637 FOR MEDICARE OP)

## 2024-08-07 PROCEDURE — C9113 INJ PANTOPRAZOLE SODIUM, VIA: HCPCS | Performed by: STUDENT IN AN ORGANIZED HEALTH CARE EDUCATION/TRAINING PROGRAM

## 2024-08-07 PROCEDURE — RXMED WILLOW AMBULATORY MEDICATION CHARGE

## 2024-08-07 PROCEDURE — 2500000005 HC RX 250 GENERAL PHARMACY W/O HCPCS: Performed by: STUDENT IN AN ORGANIZED HEALTH CARE EDUCATION/TRAINING PROGRAM

## 2024-08-07 PROCEDURE — 2500000001 HC RX 250 WO HCPCS SELF ADMINISTERED DRUGS (ALT 637 FOR MEDICARE OP): Performed by: STUDENT IN AN ORGANIZED HEALTH CARE EDUCATION/TRAINING PROGRAM

## 2024-08-07 PROCEDURE — 2500000002 HC RX 250 W HCPCS SELF ADMINISTERED DRUGS (ALT 637 FOR MEDICARE OP, ALT 636 FOR OP/ED): Performed by: STUDENT IN AN ORGANIZED HEALTH CARE EDUCATION/TRAINING PROGRAM

## 2024-08-07 PROCEDURE — 1100000001 HC PRIVATE ROOM DAILY

## 2024-08-07 RX ORDER — OXYCODONE HYDROCHLORIDE 5 MG/1
15 TABLET ORAL EVERY 4 HOURS PRN
Status: DISCONTINUED | OUTPATIENT
Start: 2024-08-07 | End: 2024-08-08 | Stop reason: HOSPADM

## 2024-08-07 RX ORDER — PANTOPRAZOLE SODIUM 40 MG/10ML
40 INJECTION, POWDER, LYOPHILIZED, FOR SOLUTION INTRAVENOUS DAILY
Status: DISCONTINUED | OUTPATIENT
Start: 2024-08-07 | End: 2024-08-08 | Stop reason: HOSPADM

## 2024-08-07 RX ORDER — HYDROMORPHONE HYDROCHLORIDE 1 MG/ML
0.2 INJECTION, SOLUTION INTRAMUSCULAR; INTRAVENOUS; SUBCUTANEOUS EVERY 2 HOUR PRN
Status: DISCONTINUED | OUTPATIENT
Start: 2024-08-07 | End: 2024-08-08 | Stop reason: HOSPADM

## 2024-08-07 RX ORDER — OXYCODONE HYDROCHLORIDE 5 MG/1
10 TABLET ORAL EVERY 4 HOURS PRN
Status: DISCONTINUED | OUTPATIENT
Start: 2024-08-07 | End: 2024-08-08 | Stop reason: HOSPADM

## 2024-08-07 ASSESSMENT — COGNITIVE AND FUNCTIONAL STATUS - GENERAL
STANDING UP FROM CHAIR USING ARMS: A LITTLE
MOBILITY SCORE: 23
DRESSING REGULAR UPPER BODY CLOTHING: A LITTLE
HELP NEEDED FOR BATHING: A LITTLE
DRESSING REGULAR LOWER BODY CLOTHING: A LOT
TOILETING: A LITTLE
MOVING TO AND FROM BED TO CHAIR: A LITTLE
MOVING FROM LYING ON BACK TO SITTING ON SIDE OF FLAT BED WITH BEDRAILS: A LITTLE
MOBILITY SCORE: 18
CLIMB 3 TO 5 STEPS WITH RAILING: A LITTLE
WALKING IN HOSPITAL ROOM: A LITTLE
CLIMB 3 TO 5 STEPS WITH RAILING: A LITTLE
DAILY ACTIVITIY SCORE: 19
TURNING FROM BACK TO SIDE WHILE IN FLAT BAD: A LITTLE

## 2024-08-07 ASSESSMENT — PAIN DESCRIPTION - LOCATION
LOCATION: BACK
LOCATION: BACK

## 2024-08-07 ASSESSMENT — PAIN SCALES - GENERAL
PAINLEVEL_OUTOF10: 7
PAINLEVEL_OUTOF10: 5 - MODERATE PAIN
PAINLEVEL_OUTOF10: 6
PAINLEVEL_OUTOF10: 6
PAINLEVEL_OUTOF10: 7
PAINLEVEL_OUTOF10: 4
PAINLEVEL_OUTOF10: 3
PAINLEVEL_OUTOF10: 5 - MODERATE PAIN

## 2024-08-07 ASSESSMENT — PAIN - FUNCTIONAL ASSESSMENT
PAIN_FUNCTIONAL_ASSESSMENT: 0-10

## 2024-08-07 NOTE — PROGRESS NOTES
Postop Pain HPI -   Palliative: relieved with IV analgesics and regional local anesthetics  Provocative: movement  Quality:  burning and aching  Radiation:  none  Severity:  8/10  Timing: constant    24-HOUR OPIOID CONSUMPTION:  Dilaudid PCA    Scheduled medications  acetaminophen, 975 mg, oral, q8h  buPROPion, 100 mg, oral, TID  calcium carbonate, 500 mg, oral, BID  ceFAZolin, 3 g, intravenous, q8h  lidocaine, 1 patch, transdermal, q24h  losartan 50 mg, hydroCHLOROthiazide 12.5 mg for Hyzaar 50 mg-12.5 mg, , oral, Daily  methocarbamol, 1,000 mg, oral, TID  pantoprazole, 40 mg, intravenous, Daily  polyethylene glycol, 17 g, oral, Daily  psyllium, 1 packet, oral, Daily  QUEtiapine, 50 mg, oral, Nightly  rosuvastatin, 10 mg, oral, Daily  sennosides-docusate sodium, 2 tablet, oral, BID      Continuous medications  HYDROmorphone,   lactated Ringer's, 100 mL/hr, Last Rate: 100 mL/hr (08/06/24 1419)      PRN medications  PRN medications: dextrose, dextrose, diphenhydrAMINE, glucagon, glucagon, naloxone, naloxone, ondansetron **OR** ondansetron     Physical Exam:  Constitutional:  no distress, alert and cooperative  Eyes: clear sclera  Head/Neck: No apparent injury, trachea midline  Respiratory/Thorax: Patent airways, thorax symmetric, breathing comfortably  Cardiovascular: no pitting edema  Gastrointestinal: Nondistended  Musculoskeletal: ROM intact  Extremities: no clubbing  Neurological: alert, summers x4  Psychological: Appropriate affect    Results for orders placed or performed during the hospital encounter of 08/05/24 (from the past 24 hour(s))   POCT GLUCOSE   Result Value Ref Range    POCT Glucose 136 (H) 74 - 99 mg/dL       Recommendations:   - IV Mg 1g Q8H x 3 doses  - IV Toradol 30mg Q6H for 3 doses per surgical service  - IV Tylenol 1000mg Q6H, time 3 hours between tylenol and toradol  - c/w Robaxin 1000mg TID  - Lidocaine patches around affected site as needed  - Continuous pulse ox  - Oral ketamine 25mg, hold if  develops hallucinations  - Recommend chronic follow up  - DC PCA and start Oxy 10mg q4h prn for moderte pain and Oxy 15mg q4h prn for severe pain. Dilaudid 0.2mg q2h prn for breakthrough pain.     Acute Pain Resident  [phone]

## 2024-08-07 NOTE — PROGRESS NOTES
Physical Therapy    Physical Therapy Treatment    Patient Name: Cj Vargas  MRN: [id number]  Today's Date: 8/7/2024  Time Calculation  Start Time: 1109  Stop Time: 1121  Time Calculation (min): 12 min    Assessment/Plan   PT Assessment  PT Assessment Results: Decreased strength, Decreased endurance  Rehab Prognosis: Good  Barriers to Discharge: Pain management  Evaluation/Treatment Tolerance: Patient tolerated treatment well  Medical Staff Made Aware: Yes  Strengths: Ability to acquire knowledge, Attitude of self, Support of Caregivers  End of Session Communication: Bedside nurse  End of Session Patient Position: Bed, 3 rail up, Alarm off, not on at start of session     PT Plan  Treatment/Interventions: Bed mobility, Transfer training, Gait training, Stair training, Balance training, Neuromuscular re-education, Strengthening, Endurance training, Range of motion, Therapeutic exercise, Therapeutic activity, Home exercise program, Positioning  PT Plan: Ongoing PT  PT Frequency: Daily  PT Discharge Recommendations: Low intensity level of continued care  PT Recommended Transfer Status: Contact guard, Assistive device  PT - OK to Discharge: Yes      General Visit Information:   PT  Visit  PT Received On: 08/07/24  Response to Previous Treatment: Patient with no complaints from previous session.  General  Family/Caregiver Present: Yes  Caregiver Feedback: wife present  Prior to Session Communication: Bedside nurse  Patient Position Received: Bed, 3 rail up, Alarm off, not on at start of session  Preferred Learning Style: visual, verbal  General Comment: met sitting upright in bed, cooperative, willing to engage    Subjective   Precautions:  Precautions  LE Weight Bearing Status: Weight Bearing as Tolerated  Medical Precautions: Fall precautions  Post-Surgical Precautions: Spinal precautions  Precautions Comment: hemovac, PCA pump  Vital Signs:       Objective   Pain:  Pain Assessment  Pain Assessment: 0-10  0-10  (Numeric) Pain Score: 4  Pain Type: Surgical pain  Cognition:  Cognition  Orientation Level: Oriented X4  Coordination:       Treatments:       Bed Mobility  Bed Mobility: Yes  Bed Mobility 1  Bed Mobility 1: Supine to sitting, Sitting to supine  Level of Assistance 1: Close supervision  Bed Mobility Comments 1: HOB elevated    Ambulation/Gait Training  Ambulation/Gait Training Performed: Yes  Ambulation/Gait Training 1  Surface 1: Level tile  Assistance 1: Close supervision  Quality of Gait 1: Diminished heel strike, Decreased step length  Comments/Distance (ft) 1: 150 ft  Transfers  Transfer: Yes  Transfer 1  Transfer From 1: Sit to, Stand to  Transfer to 1: Sit, Stand  Technique 1: Sit to stand, Stand to sit  Transfer Level of Assistance 1: Close supervision    Outcome Measures:  [facility] Basic Mobility  Turning from your back to your side while in a flat bed without using bedrails: A little  Moving from lying on your back to sitting on the side of a flat bed without using bedrails: A little  Moving to and from bed to chair (including a wheelchair): A little  Standing up from a chair using your arms (e.g. wheelchair or bedside chair): A little  To walk in hospital room: A little  Climbing 3-5 steps with railing: A little  Basic Mobility - Total Score: 18    Education Documentation  Handouts, taught by Christi Flores PTA at 8/7/2024 12:55 PM.  Learner: Significant Other, Patient  Readiness: Acceptance  Method: Explanation  Response: Verbalizes Understanding    Precautions, taught by Christi Flores PTA at 8/7/2024 12:55 PM.  Learner: Significant Other, Patient  Readiness: Acceptance  Method: Explanation  Response: Verbalizes Understanding    Body Mechanics, taught by Christi Flores PTA at 8/7/2024 12:55 PM.  Learner: Significant Other, Patient  Readiness: Acceptance  Method: Explanation  Response: Verbalizes Understanding    Home Exercise Program, taught by Christi Flores PTA at 8/7/2024 12:55 PM.  Learner:  Significant Other, Patient  Readiness: Acceptance  Method: Explanation  Response: Verbalizes Understanding    Mobility Training, taught by Christi Flores PTA at 8/7/2024 12:55 PM.  Learner: Significant Other, Patient  Readiness: Acceptance  Method: Explanation  Response: Verbalizes Understanding    Education Comments  No comments found.        OP EDUCATION:       Encounter Problems       Encounter Problems (Active)       Mobility       Gait  (Progressing)       Start:  08/06/24    Expected End:  08/20/24       Gait: Pt will ambulate 150ft with front wheeled walker at independent with no LOB.            Stairs (Progressing)       Start:  08/06/24    Expected End:  08/20/24       Stairs: pt will ascend/descend 4-6 steps in step to step pattern with proper gait mechanics and use of HR to promote functional mobility and improve functional performance.  (IND Assist level)             Bed Mobility  (Progressing)       Start:  08/06/24    Expected End:  08/20/24       Bed Mobility:  Patient will perform bed mobility at independent to improve functional mobility and maximize patient safety.                PT Transfers       Transfer (Progressing)       Start:  08/06/24    Expected End:  08/20/24       Transfers: Patient will perform functional transfers at independent to improve functional mobility and maximize patient safety.                Pain          Pain - Adult          Strength        Strength: Pt will improve B LE Strength to >/= 4+/5 to increase functional performance, tolerance to activity, and enhancing pt safety to participate in ADLs and IADLs.

## 2024-08-07 NOTE — PROGRESS NOTES
Orthopaedic Spine Surgery Progress Note    S:  No acute events overnight. He didn't sleep well last night due to his pain. Overall though, his pain has improved after consulting acute pain. He denies any worsening of his LE neurologic symptoms. He denies CP/SOB/fevers/chills. Urinating ok and passing gas. Has had decreased PO intake due to reflux symptoms.     O:  /80   Pulse 85   Temp 37.4 °C (99.3 °F) (Temporal)   Resp 18   Ht 1.829 m (6')   Wt 136 kg (298 lb 15.1 oz)   SpO2 94%   BMI 40.54 kg/m²     Gen: arousable, NAD, appropriately conversational  Cardiac: RRR to peripheral palpation  Resp: nonlabored on RA  GI: soft, nondistended    MSK:  Spine Exam:  Postoperative dressings in place without strikethrough bleeding  Drain in place holding suction, serosanginous output    L1: SILT       L2: SILT      Hip flexors 3/5 Left limited by pain; 5/5 Right  L3: SILT      Knee extension 4/5 Left limited by pain; 5/5 Right  L4: SILT      Tib Ant. (Dorsiflexion) 5/5 Left; 5/5 Right  L5: SILT      EHL 5/5 Left; 5/5 Right  S1: SILT      Plantarflexion 5/5 Left; 5/5 Right    Labs:  Results for orders placed or performed during the hospital encounter of 08/05/24 (from the past 24 hour(s))   POCT GLUCOSE   Result Value Ref Range    POCT Glucose 136 (H) 74 - 99 mg/dL       A/P: 63 y.o. male s/p L2-3 PSIF and decompression, extension of fusion to L2 on 8/5 with Dr. Morris.      Plan:  - Weightbearing status: activity ad kushal  - Precautions: no bending, twisting, lifting >10 lbs  - Imaging: none required  - Pain: multimodal regimen including Tylenol, oxycodone, Dilaudid, robaxin  - Acute pain consulted, appreciate recs.  - Dexamethasone 10 mg IV q8h x3 doses (complete)  - Perioperative antibiotics: Ancef 2g q8h x 48 hrs  - DVT prophylaxis: SCDs, ambulation; no chemoppx  - Dressing: maintain  - Drain: HV x 1, please record output every shift; anticipate removing after 48 hrs postop  - FEN: maintenance IV fluids; HLIV with  good PO intake  - Diet: clear liquid diet, advance with bowel sounds  - Pulm: encourage IS, maintain O2 sat >92%  - Bowel Regimen  - PT/OT     Dispo: [facility]    Tony Vasquez MD  Resident, PGY4  Orthopaedic Surgery    While admitted, this patient will be followed by the Ortho Spine Team. Please contact below residents with any questions (available via Epic Chat).     First call: Fidelia Boggs, PGY2  Second call: Tony Vasquez, PGY4    On weekends and after 6PM:  At [facility]: Please reach out to the orthopaedic on-call resident ([phone])  At [place]: Please reach out to the orthopaedic on-call JASON or resident (please refer to Qdave)

## 2024-08-08 ENCOUNTER — PHARMACY VISIT (OUTPATIENT)
Dept: PHARMACY | Facility: CLINIC | Age: 64
End: 2024-08-08
Payer: COMMERCIAL

## 2024-08-08 ENCOUNTER — DOCUMENTATION (OUTPATIENT)
Dept: HOME HEALTH SERVICES | Facility: HOME HEALTH | Age: 64
End: 2024-08-08
Payer: COMMERCIAL

## 2024-08-08 VITALS
TEMPERATURE: 97.7 F | WEIGHT: 298.94 LBS | DIASTOLIC BLOOD PRESSURE: 97 MMHG | OXYGEN SATURATION: 96 % | BODY MASS INDEX: 40.49 KG/M2 | RESPIRATION RATE: 18 BRPM | HEART RATE: 68 BPM | SYSTOLIC BLOOD PRESSURE: 168 MMHG | HEIGHT: 72 IN

## 2024-08-08 PROCEDURE — 97116 GAIT TRAINING THERAPY: CPT | Mod: GP,CQ

## 2024-08-08 PROCEDURE — RXMED WILLOW AMBULATORY MEDICATION CHARGE

## 2024-08-08 PROCEDURE — 2500000001 HC RX 250 WO HCPCS SELF ADMINISTERED DRUGS (ALT 637 FOR MEDICARE OP): Performed by: STUDENT IN AN ORGANIZED HEALTH CARE EDUCATION/TRAINING PROGRAM

## 2024-08-08 PROCEDURE — 2500000002 HC RX 250 W HCPCS SELF ADMINISTERED DRUGS (ALT 637 FOR MEDICARE OP, ALT 636 FOR OP/ED): Performed by: STUDENT IN AN ORGANIZED HEALTH CARE EDUCATION/TRAINING PROGRAM

## 2024-08-08 PROCEDURE — 99231 SBSQ HOSP IP/OBS SF/LOW 25: CPT | Performed by: STUDENT IN AN ORGANIZED HEALTH CARE EDUCATION/TRAINING PROGRAM

## 2024-08-08 RX ORDER — OXYCODONE AND ACETAMINOPHEN 5; 325 MG/1; MG/1
2 TABLET ORAL EVERY 4 HOURS PRN
Qty: 40 TABLET | Refills: 0 | Status: SHIPPED | OUTPATIENT
Start: 2024-08-08 | End: 2024-08-15

## 2024-08-08 RX ORDER — METHOCARBAMOL 500 MG/1
1000 TABLET, FILM COATED ORAL EVERY 8 HOURS PRN
Qty: 60 TABLET | Refills: 0 | Status: SHIPPED | OUTPATIENT
Start: 2024-08-08 | End: 2024-09-07

## 2024-08-08 ASSESSMENT — PAIN DESCRIPTION - LOCATION
LOCATION: BACK

## 2024-08-08 ASSESSMENT — COGNITIVE AND FUNCTIONAL STATUS - GENERAL
TURNING FROM BACK TO SIDE WHILE IN FLAT BAD: A LITTLE
WALKING IN HOSPITAL ROOM: A LITTLE
DRESSING REGULAR LOWER BODY CLOTHING: A LOT
MOBILITY SCORE: 21
CLIMB 3 TO 5 STEPS WITH RAILING: A LITTLE
DRESSING REGULAR UPPER BODY CLOTHING: A LITTLE
MOBILITY SCORE: 24
DAILY ACTIVITIY SCORE: 19
HELP NEEDED FOR BATHING: A LITTLE
TOILETING: A LITTLE

## 2024-08-08 ASSESSMENT — PAIN SCALES - GENERAL
PAINLEVEL_OUTOF10: 7
PAINLEVEL_OUTOF10: 6
PAINLEVEL_OUTOF10: 6
PAINLEVEL_OUTOF10: 7
PAINLEVEL_OUTOF10: 6
PAINLEVEL_OUTOF10: 7
PAINLEVEL_OUTOF10: 7
PAINLEVEL_OUTOF10: 6
PAINLEVEL_OUTOF10: 6

## 2024-08-08 ASSESSMENT — ACTIVITIES OF DAILY LIVING (ADL): LACK_OF_TRANSPORTATION: NO

## 2024-08-08 ASSESSMENT — PAIN DESCRIPTION - ORIENTATION: ORIENTATION: LOWER

## 2024-08-08 ASSESSMENT — PAIN - FUNCTIONAL ASSESSMENT
PAIN_FUNCTIONAL_ASSESSMENT: 0-10

## 2024-08-08 NOTE — PROGRESS NOTES
Orthopaedic Spine Surgery Progress Note    S:  No acute events overnight. He slept much better last night. His pain is improving. However, he has not yet received his dose of ketamine per pain management recs. He denies any worsening of his LE neurologic symptoms. He denies CP/SOB/fevers/chills. Urinating ok and passing gas. No BM yet.    O:  /81 (BP Location: Left arm, Patient Position: Lying)   Pulse 61   Temp 36.1 °C (97 °F) (Temporal)   Resp 16   Ht 1.829 m (6')   Wt 136 kg (298 lb 15.1 oz)   SpO2 98%   BMI 40.54 kg/m²     Gen: arousable, NAD, appropriately conversational  Cardiac: RRR to peripheral palpation  Resp: nonlabored on RA  GI: soft, nondistended    MSK:  Spine Exam:  Postoperative dressings in place without strikethrough bleeding  Drain in place holding suction, serosanginous output    L1: SILT       L2: SILT      Hip flexors 3/5 Left limited by pain; 5/5 Right  L3: SILT      Knee extension 4/5 Left limited by pain; 5/5 Right  L4: SILT      Tib Ant. (Dorsiflexion) 5/5 Left; 5/5 Right  L5: SILT      EHL 5/5 Left; 5/5 Right  S1: SILT      Plantarflexion 5/5 Left; 5/5 Right    Labs:  No results found for this or any previous visit (from the past 24 hour(s)).      A/P: 63 y.o. male s/p L2-3 PSIF and decompression, extension of fusion to L2 on 8/5 with Dr. Morris.      Plan:  - Weightbearing status: activity ad kushal  - Precautions: no bending, twisting, lifting >10 lbs  - Imaging: none required  - Pain: multimodal regimen including Tylenol, oxycodone, PRN Dilaudid, robaxin, and ketamine (per acute pain recs)  - Acute pain consulted, appreciate recs.  - Dexamethasone 10 mg IV q8h x3 doses (complete)  - Perioperative antibiotics: Ancef 2g q8h x 48 hrs (complete)  - DVT prophylaxis: SCDs, ambulation; no chemoppx  - Dressing: maintain  - Drain: HV x 1, please record output every shift; anticipate removing after 48 hrs postop  - FEN: maintenance IV fluids; HLIV with good PO intake  - Diet: clear  liquid diet, advance with bowel sounds  - Pulm: encourage IS, maintain O2 sat >92%  - Bowel Regimen  - PT/OT     Dispo: [facility]    Tony Vasquez MD  Resident, PGY4  Orthopaedic Surgery    While admitted, this patient will be followed by the Ortho Spine Team. Please contact below residents with any questions (available via Epic Chat).     First call: Fidelia Boggs, PGY2  Second call: Tony Vasquez, PGY4    On weekends and after 6PM:  At [facility]: Please reach out to the orthopaedic on-call resident ([phone])  At [facility]: Please reach out to the orthopaedic on-call JASON or resident (please refer to Qgenda)

## 2024-08-08 NOTE — PROGRESS NOTES
Physical Therapy Treatment    Patient Name: Cj Vargas  MRN: [id number]  Today's Date: 8/8/2024  [facility]  Time Calculation  Start Time: 0836  Stop Time: 0846  Time Calculation (min): 10 min       Assessment/Plan   PT Assessment  PT Assessment Results: Decreased strength, Decreased endurance, Pain  Rehab Prognosis: Good  Barriers to Discharge: Pain management  Evaluation/Treatment Tolerance: Patient tolerated treatment well  Medical Staff Made Aware: Yes  Strengths: Attitude of self  End of Session Patient Position: Bed, 3 rail up, Alarm off, not on at start of session     PT Plan  Treatment/Interventions: Bed mobility, Transfer training, Gait training, Stair training, Balance training, Neuromuscular re-education, Strengthening, Endurance training, Range of motion, Therapeutic exercise, Therapeutic activity, Home exercise program, Positioning  PT Plan: Ongoing PT  PT Frequency: Daily  PT Discharge Recommendations: Low intensity level of continued care  PT Recommended Transfer Status: Contact guard, Assistive device  PT - OK to Discharge: Yes  Assessment: Patient is progressing Well with therapy this date, able to complete 8 steps @ SBA. Pain more controlled. Able to maintain spinal precautions. Would continue to benefit from continued skilled PT to address all mobility deficits; Patient remains appropriate for LOW intensity therapy when medically appropriate for discharge from acute stay.  Will continue to follow.     General Visit Information:   PT  Visit  PT Received On: 08/08/24  Prior to Session Communication: Bedside nurse  Patient Position Received: Up in room     Subjective   Subjective: Pt pleasant and agreeable to therapy upon approach    Precautions:  Precautions  LE Weight Bearing Status: Weight Bearing as Tolerated  Medical Precautions: Fall precautions  Post-Surgical Precautions: Spinal precautions    Objective   Pain:  Pain Assessment  Pain Assessment: 0-10  0-10 (Numeric) Pain Score:  6  Pain Type: Surgical pain  Pain Location: Back  Pain Frequency: Constant/continuous  Cognition:  Cognition  Orientation Level: Oriented X4  Insight: Within function limits  Impulsive: Within functional limits    Lines/Tubes/Drains:  Closed/Suction Drain Inferior;Midline Back Accordion (Active)   Number of days: 3     Continuous Medications/Drips:  lactated Ringer's, 100 mL/hr, Last Rate: 100 mL/hr (08/06/24 1419)        PT Treatments:     Ambulation/Gait Training 1  Surface 1: Level tile  Device 1: No device  Assistance 1: Distant supervision  Quality of Gait 1: Diminished heel strike, Decreased step length  Comments/Distance (ft) 1: 150'x2 unsteady, occasionally reaching out for objects. Vc for slower magan for incr safety  Transfer 1  Transfer From 1: Sit to  Transfer to 1: Stand  Transfer Level of Assistance 1: Independent  Trials/Comments 1: x2, w/o device  Transfers 2  Transfer From 2: Stand to  Transfer to 2: Bed  Technique 2: Stand pivot  Transfer Level of Assistance 2: Independent  Trials/Comments 2: x1  Stairs  Stairs: Yes  Stairs  Rails 1: Bilateral  Device 1: Railing  Assistance 1: Close supervision  Comment/Number of Steps 1: 8 steps, demos no LOB or major safety concerns          Activity tolerance:  Activity Tolerance  Endurance: Endurance does not limit participation in activity    Outcome Measures:  [facility] Basic Mobility  Turning from your back to your side while in a flat bed without using bedrails: None  Moving from lying on your back to sitting on the side of a flat bed without using bedrails: A little  Moving to and from bed to chair (including a wheelchair): None  Standing up from a chair using your arms (e.g. wheelchair or bedside chair): None  To walk in hospital room: A little  Climbing 3-5 steps with railing: A little  Basic Mobility - Total Score: 21       Education Documentation  Precautions, taught by Viv Coates PTA at 8/8/2024 10:04 AM.  Learner: Patient  Readiness:  Acceptance  Method: Explanation  Response: Verbalizes Understanding    ADL Training, taught by Viv Coates PTA at 8/8/2024 10:04 AM.  Learner: Patient  Readiness: Acceptance  Method: Explanation  Response: Verbalizes Understanding    Handouts, taught by Viv Coates PTA at 8/8/2024 10:04 AM.  Learner: Patient  Readiness: Acceptance  Method: Explanation  Response: Verbalizes Understanding    Precautions, taught by Viv Coates PTA at 8/8/2024 10:04 AM.  Learner: Patient  Readiness: Acceptance  Method: Explanation  Response: Verbalizes Understanding    Body Mechanics, taught by Viv Coates PTA at 8/8/2024 10:04 AM.  Learner: Patient  Readiness: Acceptance  Method: Explanation  Response: Verbalizes Understanding    Home Exercise Program, taught by Viv Coates PTA at 8/8/2024 10:04 AM.  Learner: Patient  Readiness: Acceptance  Method: Explanation  Response: Verbalizes Understanding    Mobility Training, taught by Viv Coates PTA at 8/8/2024 10:04 AM.  Learner: Patient  Readiness: Acceptance  Method: Explanation  Response: Verbalizes Understanding    Education Comments  No comments found.          OP EDUCATION:       Encounter Problems       Encounter Problems (Active)       Mobility       Gait  (Progressing)       Start:  08/06/24    Expected End:  08/20/24       Gait: Pt will ambulate 150ft with front wheeled walker at independent with no LOB.            Stairs (Progressing)       Start:  08/06/24    Expected End:  08/20/24       Stairs: pt will ascend/descend 4-6 steps in step to step pattern with proper gait mechanics and use of HR to promote functional mobility and improve functional performance.  (IND Assist level)             Bed Mobility  (Progressing)       Start:  08/06/24    Expected End:  08/20/24       Bed Mobility:  Patient will perform bed mobility at independent to improve functional mobility and maximize patient safety.                Pain          Pain - Adult          Strength        Strength: Pt  will improve B LE Strength to >/= 4+/5 to increase functional performance, tolerance to activity, and enhancing pt safety to participate in ADLs and IADLs.                   Encounter Problems (Resolved)       PT Transfers       Transfer (Met)       Start:  08/06/24    Expected End:  08/20/24    Resolved:  08/08/24    Transfers: Patient will perform functional transfers at independent to improve functional mobility and maximize patient safety.

## 2024-08-08 NOTE — PROGRESS NOTES
Met with pt and introduced myself as care coordinator with Care Transitions Team for discharge planning. Pt reports being independent with ADL's for the most part.  Pt reported no safety concerns at home, he stated no falls in last year. Pt's address, phone number, and contact information was verified.    Home care: [facility]   DME: None  : none.  PCP: Joseph  Last appt: 3 Weeks ago   Transport to appts: Wife  Pharm: [facility] (denies issues affording/obtaining medications)     Discharge Planning: Pt stated he would like to return to home at time of discharge,With [facility] SOC 8/9/2024 Attending updated. Wife will transport Pt home at the time of discharge today. Floor Nurse aware. Pt aware  Care coordinator will continue to follow for discharge planning needs.

## 2024-08-08 NOTE — NURSING NOTE
Patient discharged home iv removed discharge instructions explained, meds to bed to come, instructions and plans discussed, dressing supplies given pt medicated for pain patient verbalized understanding  Peggy Garner RN

## 2024-08-08 NOTE — HH CARE COORDINATION
Home Care received a Referral for Nursing and Physical Therapy. We have processed the referral for a Start of Care on 8/10.     If you have any questions or concerns, please feel free to contact us at [phone]. Follow the prompts, enter your five digit zip code, and you will be directed to your care team on CENTL 2.

## 2024-08-08 NOTE — PROGRESS NOTES
Postop Pain HPI -   Palliative: relieved with IV analgesics and regional local anesthetics  Provocative: movement  Quality:  burning and aching  Radiation:  none  Severity:  4/10  Timing: constant    24-HOUR OPIOID CONSUMPTION:  Oxycodone 15 mg x3  Oxycodone 10mgx2    Scheduled medications  acetaminophen, 975 mg, oral, q8h  buPROPion, 100 mg, oral, TID  calcium carbonate, 500 mg, oral, BID  ketamine, 25 mg, oral, q8h  lidocaine, 1 patch, transdermal, q24h  losartan 50 mg, hydroCHLOROthiazide 12.5 mg for Hyzaar 50 mg-12.5 mg, , oral, Daily  methocarbamol, 1,000 mg, oral, TID  pantoprazole, 40 mg, intravenous, Daily  polyethylene glycol, 17 g, oral, Daily  psyllium, 1 packet, oral, Daily  QUEtiapine, 50 mg, oral, Nightly  rosuvastatin, 10 mg, oral, Daily  sennosides-docusate sodium, 2 tablet, oral, BID      Continuous medications  lactated Ringer's, 100 mL/hr, Last Rate: 100 mL/hr (08/06/24 1419)      PRN medications  PRN medications: dextrose, dextrose, diphenhydrAMINE, glucagon, glucagon, HYDROmorphone, naloxone, naloxone, ondansetron **OR** ondansetron, oxyCODONE, oxyCODONE     Physical Exam:  Constitutional:  no distress, alert and cooperative  Eyes: clear sclera  Head/Neck: No apparent injury, trachea midline  Respiratory/Thorax: Patent airways, thorax symmetric, breathing comfortably  Cardiovascular: no pitting edema  Gastrointestinal: Nondistended  Musculoskeletal: ROM intact  Extremities: no clubbing  Neurological: alert, summers x4  Psychological: Appropriate affect    No results found for this or any previous visit (from the past 24 hour(s)).      Recommendations:   - IV Mg 1g Q8H x 3 doses  - IV Toradol 30mg Q6H for 3 doses per surgical service  - IV Tylenol 1000mg Q6H, time 3 hours between tylenol and toradol  - c/w Robaxin 1000mg TID  - Lidocaine patches around affected site as needed  - Continuous pulse ox  - Oral ketamine 25mg, hold if develops hallucinations  - Recommend chronic follow up  - DC PCA and  start Oxy 10mg q4h prn for moderte pain and Oxy 15mg q4h prn for severe pain. Dilaudid 0.2mg q2h prn for breakthrough pain.

## 2024-08-08 NOTE — CARE PLAN
Problem: Pain - Adult  Goal: Verbalizes/displays adequate comfort level or baseline comfort level  Outcome: Progressing     Problem: Safety - Adult  Goal: Free from fall injury  Outcome: Progressing   The patient's goals for the shift include      The clinical goals for the shift include patient to remain stable free of falls    Voiding without difficulty. Hemovac output monitored. Report given to HUNG Quintanilla. Patient sleeping at present.

## 2024-08-09 NOTE — DISCHARGE SUMMARY
Discharge Diagnosis  Spinal stenosis, lumbar region with neurogenic claudication    Issues Requiring Follow-Up      Test Results Pending At Discharge  Pending Labs       No current pending labs.            Hospital Course  63 year-old male who presented with bilateral lumbar radiculopathy. Patient is now s/p L2-3 PSIF w decompression on 8/5 by Dr. Morris. On the day of surgery, patient was identified in the pre-operative holding area and agreeable to proceed with surgery. Written consent was obtained.  Please see operative note for further details of this procedure. The surgery went without complications. Patient received 24 hours of sabino-operative antibiotics. Patient recovered in the PACU before transfer to a regular nursing floor. The patient was monitored as an inpatient postoperatively for drain output, therapy, and pain control. The drain was pulled when output was appropriately low. The patient had good pain control, was voiding, and was neurovascularly stable at the time of discharge. Patient will follow-up with Dr. Morris in 3-4 weeks for post-operative visit.     Pertinent Physical Exam At Time of Discharge  Physical Exam    Home Medications     Medication List      START taking these medications     acetaminophen 500 mg tablet; Commonly known as: Tylenol; Take 2 tablets   (1,000 mg) by mouth every 8 hours for 10 days.   * methocarbamol 500 mg tablet; Commonly known as: Robaxin; Take 2   tablets (1,000 mg) by mouth every 8 hours for 10 days.   * methocarbamol 500 mg tablet; Commonly known as: Robaxin; Take 2   tablets (1,000 mg) by mouth every 8 hours if needed for muscle spasms.   oxyCODONE-acetaminophen 5-325 mg tablet; Commonly known as: Percocet;   Take 2 tablets by mouth every 4 hours if needed for severe pain (7 - 10)   for up to 7 days.   polyethylene glycol 17 gram/dose powder; Commonly known as: Glycolax,   Miralax; Take 17 g by mouth 2 times a day as needed (constipation) for up   to 10 days.  *  This list has 2 medication(s) that are the same as other medications   prescribed for you. Read the directions carefully, and ask your doctor or   other care provider to review them with you.     CONTINUE taking these medications     buPROPion 100 mg tablet; Commonly known as: Wellbutrin; TAKE 1 TABLET 3   TIMES A DAY   cholecalciferol (vitamin D3) 250 mcg (10,000 unit) tablet   Eliquis 5 mg tablet; Generic drug: apixaban; TAKE 1 TABLET TWICE A DAY.   DO NOT START BEFORE        10/21/23.   enoxaparin 120 mg/0.8 mL syringe; Commonly known as: Lovenox; Inject 0.8   mL (120 mg) under the skin every 12 hours.   gabapentin 600 mg tablet; Commonly known as: Neurontin; TAKE 1 TABLET 3   TIMES A DAY   irbesartan-hydrochlorothiazide 150-12.5 mg tablet; Commonly known as:   Avalide; TAKE 1 TABLET ONCE DAILY   multivitamin tablet   omeprazole 40 mg DR capsule; Commonly known as: PriLOSEC; Take 1 capsule   (40 mg) by mouth once daily. Do not crush or chew.   QUEtiapine 50 mg tablet; Commonly known as: SEROquel; TAKE 1 TABLET ONCE   DAILY ATBEDTIME   rosuvastatin 10 mg tablet; Commonly known as: Crestor; TAKE 1 TABLET   ONCE DAILY   sildenafil 100 mg tablet; Commonly known as: Viagra; Take 1 tablet (100   mg) by mouth once daily as needed for erectile dysfunction.   tiZANidine 4 mg tablet; Commonly known as: Zanaflex; TAKE 1 TABLET 3   TIMES A DAY     STOP taking these medications     chlorhexidine 0.12 % solution; Commonly known as: Peridex   chlorhexidine 4 % external liquid; Commonly known as: Hibiclens       Outpatient Follow-Up  Future Appointments   Date Time Provider Department [place]   8/15/2024  2:00 PM Lashell Walton PA-C [id number] [facility]   8/26/2024 11:15 AM Shyla Cheatham MD [id number] [facility]   9/18/2024 11:20 AM Chin Morris MD [id number] [facility]   3/6/2025  1:00 PM Tony Donovan MD [id number] [facility]       Chin Morris MD

## 2024-08-10 ENCOUNTER — HOME CARE VISIT (OUTPATIENT)
Dept: HOME HEALTH SERVICES | Facility: HOME HEALTH | Age: 64
End: 2024-08-10
Payer: COMMERCIAL

## 2024-08-10 VITALS
TEMPERATURE: 98.6 F | DIASTOLIC BLOOD PRESSURE: 90 MMHG | HEART RATE: 90 BPM | SYSTOLIC BLOOD PRESSURE: 143 MMHG | RESPIRATION RATE: 16 BRPM | OXYGEN SATURATION: 97 %

## 2024-08-10 PROCEDURE — G0299 HHS/HOSPICE OF RN EA 15 MIN: HCPCS

## 2024-08-10 ASSESSMENT — ACTIVITIES OF DAILY LIVING (ADL)
OASIS_M1830: 05
ENTERING_EXITING_HOME: NEEDS ASSISTANCE

## 2024-08-10 ASSESSMENT — ENCOUNTER SYMPTOMS
PAIN LOCATION - PAIN FREQUENCY: CONSTANT
OCCASIONAL FEELINGS OF UNSTEADINESS: 0
HIGHEST PAIN SEVERITY IN PAST 24 HOURS: 9/10
PAIN LOCATION - PAIN SEVERITY: 6/10
PAIN LOCATION: BACK
DENIES PAIN: 1
LOWEST PAIN SEVERITY IN PAST 24 HOURS: 4/10
PAIN LOCATION - PAIN QUALITY: SHARP
APPETITE LEVEL: GOOD

## 2024-08-12 ENCOUNTER — HOME CARE VISIT (OUTPATIENT)
Dept: HOME HEALTH SERVICES | Facility: HOME HEALTH | Age: 64
End: 2024-08-12
Payer: COMMERCIAL

## 2024-08-12 PROCEDURE — G0151 HHCP-SERV OF PT,EA 15 MIN: HCPCS

## 2024-08-12 SDOH — HEALTH STABILITY: PHYSICAL HEALTH: EXERCISE TYPE: PATIENT DECLINED HEP AT THIS TIME.

## 2024-08-12 ASSESSMENT — ACTIVITIES OF DAILY LIVING (ADL)
PHYSICAL TRANSFERS ASSESSED: 1
AMBULATION ASSISTANCE: INDEPENDENT
CURRENT_FUNCTION: INDEPENDENT
AMBULATION ASSISTANCE: 1

## 2024-08-12 ASSESSMENT — BALANCE ASSESSMENTS
SITTING DOWN: 1 - USES ARMS OR NOT SMOOTH MOTION
NUDGED SCORE: 2
TURNING 360 DEGREES STEPS: 1 - CONTINUOUS STEPS
SITTING BALANCE: 1 - STEADY, SAFE
ARISES: 1 - ABLE, USES ARMS TO HELP
NUDGED: 2 - STEADY
BALANCE SCORE: 14
IMMEDIATE STANDING BALANCE FIRST 5 SECONDS: 2 - STEADY WITHOUT WALKER OR OTHER SUPPORT
ATTEMPTS TO ARISE: 2 - ABLE TO RISE, ONE ATTEMPT
STANDING BALANCE: 2 - NARROW STANCE WITHOUT SUPPORT
ARISING SCORE: 1
EYES CLOSED AT MAXIMUM POSITION NUDGED: 1 - STEADY

## 2024-08-12 ASSESSMENT — PAIN SCALES - PAIN ASSESSMENT IN ADVANCED DEMENTIA (PAINAD)
BREATHING: 0
BODYLANGUAGE: 0 - RELAXED.
NEGVOCALIZATION: 0
FACIALEXPRESSION: 0
FACIALEXPRESSION: 0 - SMILING OR INEXPRESSIVE.
BODYLANGUAGE: 0
NEGVOCALIZATION: 0 - NONE.

## 2024-08-12 ASSESSMENT — ENCOUNTER SYMPTOMS
PAIN LOCATION - PAIN FREQUENCY: CONSTANT
OCCASIONAL FEELINGS OF UNSTEADINESS: 0
SUBJECTIVE PAIN PROGRESSION: UNCHANGED
PAIN LOCATION - RELIEVING FACTORS: ICE MEDS
LOWEST PAIN SEVERITY IN PAST 24 HOURS: 4/10
PERSON REPORTING PAIN: PATIENT
HIGHEST PAIN SEVERITY IN PAST 24 HOURS: 8/10
DEPRESSION: 0
PAIN LOCATION: BACK
PAIN: 1
LOSS OF SENSATION IN FEET: 1

## 2024-08-12 ASSESSMENT — GAIT ASSESSMENTS
PATH: 2 - STRAIGHT WITHOUT WALKING AID
BALANCE AND GAIT SCORE: 25
STEP CONTINUITY: 1 - STEPS APPEAR CONTINUOUS
PATH SCORE: 2
TRUNK: 1 - NO SWAY BUT FLEXION OF KNEES OR BACK OR SPREADS ARMS WHILE WALKING
STEP SYMMETRY: 1 - RIGHT AND LEFT STEP LENGTH APPEAR EQUAL
WALKING STANCE: 1 - HEELS ALMOST TOUCHING WHILE WALKING
TRUNK SCORE: 1
GAIT SCORE: 11
INITIATION OF GAIT IMMEDIATELY AFTER GO: 1 - NO HESITANCY

## 2024-08-13 ENCOUNTER — TELEPHONE (OUTPATIENT)
Dept: INPATIENT UNIT | Facility: HOSPITAL | Age: 64
End: 2024-08-13
Payer: COMMERCIAL

## 2024-08-13 DIAGNOSIS — M48.061 SPINAL STENOSIS, LUMBAR REGION, WITHOUT NEUROGENIC CLAUDICATION: ICD-10-CM

## 2024-08-13 RX ORDER — OXYCODONE AND ACETAMINOPHEN 5; 325 MG/1; MG/1
2 TABLET ORAL EVERY 4 HOURS PRN
Qty: 84 TABLET | Refills: 0 | Status: SHIPPED | OUTPATIENT
Start: 2024-08-13 | End: 2024-08-20

## 2024-08-13 NOTE — TELEPHONE ENCOUNTER
Home care for Mr. Vargas called stating the patient will need a refill for his pain medication. The patient discharged from [facility] 5 days ago. Narcotic scripts are generally written for a 7 day period. When reviewing the script that the patient was discharged with, it was noted that the number of tablets prescribed were not enough for 7 days at the dosing that it was written for.  I notified Dr. Morris's JUSTINO Lobo, and a new script was sent in, in hopes the pharmacy will refill prior to 7 days.

## 2024-08-14 ENCOUNTER — HOME CARE VISIT (OUTPATIENT)
Dept: HOME HEALTH SERVICES | Facility: HOME HEALTH | Age: 64
End: 2024-08-14
Payer: COMMERCIAL

## 2024-08-15 ENCOUNTER — OFFICE VISIT (OUTPATIENT)
Dept: ORTHOPEDIC SURGERY | Facility: CLINIC | Age: 64
End: 2024-08-15
Payer: COMMERCIAL

## 2024-08-15 DIAGNOSIS — M48.061 SPINAL STENOSIS, LUMBAR REGION, WITHOUT NEUROGENIC CLAUDICATION: ICD-10-CM

## 2024-08-15 PROCEDURE — 1036F TOBACCO NON-USER: CPT | Performed by: PHYSICIAN ASSISTANT

## 2024-08-15 PROCEDURE — 99211 OFF/OP EST MAY X REQ PHY/QHP: CPT | Performed by: PHYSICIAN ASSISTANT

## 2024-08-15 RX ORDER — METHOCARBAMOL 500 MG/1
1000 TABLET, FILM COATED ORAL EVERY 8 HOURS PRN
Qty: 60 TABLET | Refills: 0 | Status: SHIPPED | OUTPATIENT
Start: 2024-08-15 | End: 2024-09-14

## 2024-08-15 ASSESSMENT — PAIN SCALES - GENERAL: PAINLEVEL_OUTOF10: 4

## 2024-08-15 ASSESSMENT — PAIN - FUNCTIONAL ASSESSMENT: PAIN_FUNCTIONAL_ASSESSMENT: 0-10

## 2024-08-15 ASSESSMENT — PAIN DESCRIPTION - DESCRIPTORS: DESCRIPTORS: ACHING;SHARP

## 2024-08-15 NOTE — PROGRESS NOTES
Cj returns today for suture removal.  Date of surgery was August 5 where he underwent a posterior lateral spine fusion, this was a revision.    The patient is still dealing with a pretty decent amount of pain, he is on oxycodone, 20 mg first thing in the morning and then is taking 10 mg every 4-6 hours for breakthrough pain.  He is noticing some improvement but again the symptoms are still there.    We did discuss that this will continue to improve as he continues to heal.    With his consent, his incision was cleaned with an alcohol pad and his sutures were removed without difficulty.  He was instructed to wait 24 hours to shower    He is asking for refill on his muscle relaxer, he does not need a refill on his pain meds yet but if he does he will call our office.    This note was dictated using speech recognition software and was not corrected for spelling or grammatical errors

## 2024-08-21 DIAGNOSIS — M48.061 SPINAL STENOSIS, LUMBAR REGION, WITHOUT NEUROGENIC CLAUDICATION: ICD-10-CM

## 2024-08-21 RX ORDER — OXYCODONE AND ACETAMINOPHEN 5; 325 MG/1; MG/1
2 TABLET ORAL EVERY 6 HOURS PRN
Qty: 56 TABLET | Refills: 0 | Status: SHIPPED | OUTPATIENT
Start: 2024-08-21 | End: 2024-08-28

## 2024-08-22 ENCOUNTER — HOME CARE VISIT (OUTPATIENT)
Dept: HOME HEALTH SERVICES | Facility: HOME HEALTH | Age: 64
End: 2024-08-22
Payer: COMMERCIAL

## 2024-08-22 PROCEDURE — G0299 HHS/HOSPICE OF RN EA 15 MIN: HCPCS

## 2024-08-23 VITALS
HEART RATE: 76 BPM | SYSTOLIC BLOOD PRESSURE: 127 MMHG | RESPIRATION RATE: 16 BRPM | DIASTOLIC BLOOD PRESSURE: 78 MMHG | TEMPERATURE: 97.7 F | OXYGEN SATURATION: 97 %

## 2024-08-23 ASSESSMENT — ENCOUNTER SYMPTOMS
LOWEST PAIN SEVERITY IN PAST 24 HOURS: 6/10
PAIN LOCATION - PAIN SEVERITY: 6/10
APPETITE LEVEL: GOOD
AGITATION: 1
OCCASIONAL FEELINGS OF UNSTEADINESS: 0
HIGHEST PAIN SEVERITY IN PAST 24 HOURS: 9/10
PAIN SEVERITY GOAL: 0/10
DENIES PAIN: 1
PAIN LOCATION: BACK

## 2024-08-24 ASSESSMENT — EXTERNAL EXAM - LEFT EYE: OS_EXAM: NORMAL

## 2024-08-24 ASSESSMENT — EXTERNAL EXAM - RIGHT EYE: OD_EXAM: NORMAL

## 2024-08-24 ASSESSMENT — SLIT LAMP EXAM - LIDS
COMMENTS: GOOD POSITION
COMMENTS: GOOD POSITION

## 2024-08-24 ASSESSMENT — CUP TO DISC RATIO
OS_RATIO: .3
OD_RATIO: .3

## 2024-08-25 NOTE — PROGRESS NOTES
LOV 12/14/22      Macular RPE [id number]  Family history of macular [id number]  (+)FH AMD - Father (went blind)  -OCT macula (8/26/24) - SS: 5/10 OD and 7/10 OS. Normal thickness and contour OU. Intact EZ OU. No edema OU. 273/282.   -No history of tobacco use.  -May eat green leafy vegetables, fish, brightly colored fruits and vegetables.  Wear UV blocking sunglasses when outside.  May consider AREDs in the future if condition worsens.   -Amsler given in 2022  -F/u 1 year for comprehensive exam with OCT macula.     Combined form of age-related cataract, right eyeH25.811  Combined form of age-related cataract, left eyeH25.812  -Not visually significant at this time. Monitor.     S/P LASIK surgery of both eyesZ98.890  -S/p LASIK in 1990s. Prior history of myopia.     YcanccvrbF04.00  QplcvcqlqmvX21.209  KgvrgtgbwbL62.4  -Previously with difficulty with fit of glasses with prior optical shop.   -New Rx given per patient request.       No history of intraocular surgery  No FH of glaucoma

## 2024-08-26 ENCOUNTER — APPOINTMENT (OUTPATIENT)
Dept: OPHTHALMOLOGY | Facility: CLINIC | Age: 64
End: 2024-08-26
Payer: COMMERCIAL

## 2024-08-26 DIAGNOSIS — H52.203 ASTIGMATISM OF BOTH EYES, UNSPECIFIED TYPE: ICD-10-CM

## 2024-08-26 DIAGNOSIS — H52.03 HYPEROPIA, BILATERAL: ICD-10-CM

## 2024-08-26 DIAGNOSIS — H25.811 COMBINED FORM OF AGE-RELATED CATARACT, RIGHT EYE: Primary | ICD-10-CM

## 2024-08-26 DIAGNOSIS — H52.4 PRESBYOPIA: ICD-10-CM

## 2024-08-26 DIAGNOSIS — Z98.890 HISTORY OF REFRACTIVE SURGERY: ICD-10-CM

## 2024-08-26 DIAGNOSIS — H25.812 COMBINED FORM OF AGE-RELATED CATARACT, LEFT EYE: ICD-10-CM

## 2024-08-26 DIAGNOSIS — H35.89 RPE MOTTLING OF MACULA: ICD-10-CM

## 2024-08-26 PROCEDURE — 92134 CPTRZ OPH DX IMG PST SGM RTA: CPT | Performed by: OPHTHALMOLOGY

## 2024-08-26 PROCEDURE — 99213 OFFICE O/P EST LOW 20 MIN: CPT | Performed by: OPHTHALMOLOGY

## 2024-08-26 PROCEDURE — 92015 DETERMINE REFRACTIVE STATE: CPT | Performed by: OPHTHALMOLOGY

## 2024-08-26 ASSESSMENT — CONF VISUAL FIELD
OS_INFERIOR_NASAL_RESTRICTION: 0
OS_SUPERIOR_TEMPORAL_RESTRICTION: 0
OD_INFERIOR_TEMPORAL_RESTRICTION: 0
OD_SUPERIOR_TEMPORAL_RESTRICTION: 0
OS_NORMAL: 1
OS_INFERIOR_TEMPORAL_RESTRICTION: 0
OD_NORMAL: 1
OD_SUPERIOR_NASAL_RESTRICTION: 0
OD_INFERIOR_NASAL_RESTRICTION: 0
OS_SUPERIOR_NASAL_RESTRICTION: 0

## 2024-08-26 ASSESSMENT — VISUAL ACUITY
OS_PH_SC+: -1
OS_SC+: -2
OD_SC+: +2
OS_PH_SC: 20/20
OD_SC: 20/25
OS_SC: 20/50
METHOD: SNELLEN - LINEAR

## 2024-08-26 ASSESSMENT — TONOMETRY
OD_IOP_MMHG: 15
OS_IOP_MMHG: 15
IOP_METHOD: GOLDMANN APPLANATION

## 2024-08-26 ASSESSMENT — REFRACTION_MANIFEST
OD_ADD: +2.50
OS_ADD: +2.50
OD_SPHERE: +1.25
OD_AXIS: 085
OD_CYLINDER: -1.25
OS_SPHERE: +1.50
OS_CYLINDER: SPHERE

## 2024-08-26 ASSESSMENT — REFRACTION_WEARINGRX
OD_AXIS: 085
OS_SPHERE: +2.50
OD_SPHERE: +1.75
OD_CYLINDER: -1.00
OS_CYLINDER: SPHER

## 2024-08-26 ASSESSMENT — ENCOUNTER SYMPTOMS: EYES NEGATIVE: 1

## 2024-08-27 DIAGNOSIS — M48.061 SPINAL STENOSIS, LUMBAR REGION, WITHOUT NEUROGENIC CLAUDICATION: ICD-10-CM

## 2024-08-27 RX ORDER — OXYCODONE AND ACETAMINOPHEN 5; 325 MG/1; MG/1
2 TABLET ORAL EVERY 6 HOURS PRN
Qty: 56 TABLET | Refills: 0 | Status: SHIPPED | OUTPATIENT
Start: 2024-08-27 | End: 2024-09-03

## 2024-09-04 DIAGNOSIS — M48.061 SPINAL STENOSIS, LUMBAR REGION, WITHOUT NEUROGENIC CLAUDICATION: ICD-10-CM

## 2024-09-04 RX ORDER — OXYCODONE AND ACETAMINOPHEN 5; 325 MG/1; MG/1
2 TABLET ORAL EVERY 8 HOURS PRN
Qty: 42 TABLET | Refills: 0 | Status: SHIPPED | OUTPATIENT
Start: 2024-09-04 | End: 2024-09-11

## 2024-09-06 DIAGNOSIS — G47.33 OSA ON CPAP: Primary | ICD-10-CM

## 2024-09-07 DIAGNOSIS — M62.838 MUSCLE SPASM: ICD-10-CM

## 2024-09-09 RX ORDER — TIZANIDINE 4 MG/1
4 TABLET ORAL 3 TIMES DAILY
Qty: 270 TABLET | Refills: 0 | Status: SHIPPED | OUTPATIENT
Start: 2024-09-09

## 2024-09-11 ENCOUNTER — TELEPHONE (OUTPATIENT)
Dept: INPATIENT UNIT | Facility: HOSPITAL | Age: 64
End: 2024-09-11
Payer: COMMERCIAL

## 2024-09-11 DIAGNOSIS — M48.061 SPINAL STENOSIS, LUMBAR REGION, WITHOUT NEUROGENIC CLAUDICATION: ICD-10-CM

## 2024-09-11 RX ORDER — OXYCODONE AND ACETAMINOPHEN 5; 325 MG/1; MG/1
1 TABLET ORAL EVERY 8 HOURS PRN
Qty: 21 TABLET | Refills: 0 | Status: SHIPPED | OUTPATIENT
Start: 2024-09-11 | End: 2024-09-18

## 2024-09-18 ENCOUNTER — HOSPITAL ENCOUNTER (OUTPATIENT)
Dept: RADIOLOGY | Facility: CLINIC | Age: 64
Discharge: HOME | End: 2024-09-18
Payer: COMMERCIAL

## 2024-09-18 ENCOUNTER — APPOINTMENT (OUTPATIENT)
Dept: ORTHOPEDIC SURGERY | Facility: CLINIC | Age: 64
End: 2024-09-18
Payer: COMMERCIAL

## 2024-09-18 DIAGNOSIS — M48.061 SPINAL STENOSIS, LUMBAR REGION, WITHOUT NEUROGENIC CLAUDICATION: ICD-10-CM

## 2024-09-18 PROCEDURE — 72100 X-RAY EXAM L-S SPINE 2/3 VWS: CPT

## 2024-09-18 PROCEDURE — 99024 POSTOP FOLLOW-UP VISIT: CPT | Performed by: ORTHOPAEDIC SURGERY

## 2024-09-18 PROCEDURE — 72100 X-RAY EXAM L-S SPINE 2/3 VWS: CPT | Performed by: RADIOLOGY

## 2024-09-18 PROCEDURE — 1036F TOBACCO NON-USER: CPT | Performed by: ORTHOPAEDIC SURGERY

## 2024-09-18 RX ORDER — OXYCODONE AND ACETAMINOPHEN 5; 325 MG/1; MG/1
2 TABLET ORAL EVERY 8 HOURS PRN
Qty: 28 TABLET | Refills: 0 | Status: SHIPPED | OUTPATIENT
Start: 2024-09-18 | End: 2024-09-25

## 2024-09-18 ASSESSMENT — PAIN SCALES - GENERAL: PAINLEVEL_OUTOF10: 7

## 2024-09-18 ASSESSMENT — PAIN - FUNCTIONAL ASSESSMENT: PAIN_FUNCTIONAL_ASSESSMENT: 0-10

## 2024-09-18 NOTE — PROGRESS NOTES
Dileep is 6 weeks from surgery.  He has done well.  He has returned to work over the last 3 weeks.  He notes significant improvement in his radicular symptoms.    He does have some low back pain.  He is taking Percocet in the morning.    On exam he looks quite good.  His posture is upright.  He is moving very well.  Both incisions healed.    Strength intact in both lower extremities.    X-rays show good position of his instrumentation.    Stable course.  He will continue a home exercise program.  Will see him back in 6 weeks for clinical check.  He does mention diffuse numbness in both arms and hands so we will reassess his cervical spine at that time.    Will refill his Percocet to use in the morning for the next 2 weeks.

## 2024-09-18 NOTE — LETTER
September 18, 2024     Damion Ruiz MD  [address] [place] [address] [address]  [address]  [facility] [address]    Patient: Cj Vargas   YOB: 1960   Date of Visit: 9/18/2024       Dear Dr. Damion Ruiz MD:    Thank you for referring Cj Vargas to me for evaluation. Below are my notes for this consultation.  If you have questions, please do not hesitate to call me. I look forward to following your patient along with you.       Sincerely,     Chin Morris MD      CC: No Recipients  ______________________________________________________________________________________    Dileep is 6 weeks from surgery.  He has done well.  He has returned to work over the last 3 weeks.  He notes significant improvement in his radicular symptoms.    He does have some low back pain.  He is taking Percocet in the morning.    On exam he looks quite good.  His posture is upright.  He is moving very well.  Both incisions healed.    Strength intact in both lower extremities.    X-rays show good position of his instrumentation.    Stable course.  He will continue a home exercise program.  Will see him back in 6 weeks for clinical check.  He does mention diffuse numbness in both arms and hands so we will reassess his cervical spine at that time.    Will refill his Percocet to use in the morning for the next 2 weeks.

## 2024-09-27 DIAGNOSIS — I82.431 ACUTE DEEP VEIN THROMBOSIS (DVT) OF POPLITEAL VEIN OF RIGHT LOWER EXTREMITY (MULTI): ICD-10-CM

## 2024-09-27 DIAGNOSIS — E78.5 DYSLIPIDEMIA: ICD-10-CM

## 2024-09-27 DIAGNOSIS — F41.8 DEPRESSION WITH ANXIETY: ICD-10-CM

## 2024-09-27 DIAGNOSIS — G62.9 NEUROPATHY: ICD-10-CM

## 2024-09-27 DIAGNOSIS — I10 BENIGN ESSENTIAL HYPERTENSION: ICD-10-CM

## 2024-09-27 RX ORDER — ROSUVASTATIN CALCIUM 10 MG/1
10 TABLET, COATED ORAL DAILY
Qty: 90 TABLET | Refills: 1 | Status: SHIPPED | OUTPATIENT
Start: 2024-09-27

## 2024-09-27 RX ORDER — IRBESARTAN AND HYDROCHLOROTHIAZIDE 150; 12.5 MG/1; MG/1
1 TABLET, FILM COATED ORAL DAILY
Qty: 90 TABLET | Refills: 1 | Status: SHIPPED | OUTPATIENT
Start: 2024-09-27

## 2024-09-27 RX ORDER — GABAPENTIN 600 MG/1
600 TABLET ORAL 3 TIMES DAILY
Qty: 270 TABLET | Refills: 1 | Status: SHIPPED | OUTPATIENT
Start: 2024-09-27

## 2024-09-27 RX ORDER — APIXABAN 5 MG/1
5 TABLET, FILM COATED ORAL 2 TIMES DAILY
Qty: 180 TABLET | Refills: 1 | Status: SHIPPED | OUTPATIENT
Start: 2024-09-27

## 2024-09-27 RX ORDER — QUETIAPINE FUMARATE 50 MG/1
50 TABLET, FILM COATED ORAL NIGHTLY
Qty: 90 TABLET | Refills: 1 | Status: SHIPPED | OUTPATIENT
Start: 2024-09-27

## 2024-11-06 ENCOUNTER — APPOINTMENT (OUTPATIENT)
Dept: ORTHOPEDIC SURGERY | Facility: CLINIC | Age: 64
End: 2024-11-06
Payer: COMMERCIAL

## 2024-11-06 DIAGNOSIS — M54.16 LUMBAR RADICULOPATHY: ICD-10-CM

## 2024-11-06 PROCEDURE — 1036F TOBACCO NON-USER: CPT | Performed by: ORTHOPAEDIC SURGERY

## 2024-11-06 PROCEDURE — 99024 POSTOP FOLLOW-UP VISIT: CPT | Performed by: ORTHOPAEDIC SURGERY

## 2024-11-06 NOTE — PROGRESS NOTES
Callus nearly 3 months from surgery and he is continued to do well.  He does note significant improvement in his leg symptoms and his balance.    He is bothered by ongoing paresthesias in both hands, right greater than left.  He has seen Dr. Hope for carpal tunnel syndrome and had nerve conduction studies that confirm things.    On exam, his gait is quite stable.  His strength is intact.    Stable course following lumbar decompression and fusion.  I do encourage him to continue a conditioning and exercise program.    Symptomatically he does experience carpal tunnel-like syndromes on the right upper extremity.  He has not had any imaging of the cervical spine.    I would suggest he touch base with Dr. Hope and perhaps consider injection on the right carpal tunnel and this may be both diagnostic and therapeutic benefit.    He will keep me updated on his progress.    ** Dictated with voice recognition software and not immediately reviewed for errors in grammar and/or spelling **

## 2024-11-08 ENCOUNTER — TELEPHONE (OUTPATIENT)
Dept: SCHEDULING | Age: 64
End: 2024-11-08
Payer: COMMERCIAL

## 2024-11-16 DIAGNOSIS — K21.9 GASTROESOPHAGEAL REFLUX DISEASE WITHOUT ESOPHAGITIS: ICD-10-CM

## 2024-11-18 RX ORDER — OMEPRAZOLE 40 MG/1
CAPSULE, DELAYED RELEASE ORAL
Qty: 90 CAPSULE | Refills: 1 | Status: SHIPPED | OUTPATIENT
Start: 2024-11-18

## 2024-11-22 ENCOUNTER — LAB (OUTPATIENT)
Dept: LAB | Facility: LAB | Age: 64
End: 2024-11-22
Payer: COMMERCIAL

## 2024-11-22 DIAGNOSIS — E29.1 TESTICULAR HYPOFUNCTION: Primary | ICD-10-CM

## 2024-11-22 DIAGNOSIS — R68.89 OTHER GENERAL SYMPTOMS AND SIGNS: ICD-10-CM

## 2024-11-22 DIAGNOSIS — E07.9 DISORDER OF THYROID, UNSPECIFIED: ICD-10-CM

## 2024-11-22 DIAGNOSIS — R79.89 OTHER SPECIFIED ABNORMAL FINDINGS OF BLOOD CHEMISTRY: ICD-10-CM

## 2024-11-22 LAB
ESTRADIOL SERPL-MCNC: 58 PG/ML
FSH SERPL-ACNC: <0.9 IU/L
T3FREE SERPL-MCNC: 3.1 PG/ML (ref 2.3–4.2)
THYROPEROXIDASE AB SERPL-ACNC: 41 IU/ML
TSH SERPL-ACNC: 1.3 MIU/L (ref 0.44–3.98)

## 2024-11-22 PROCEDURE — 84402 ASSAY OF FREE TESTOSTERONE: CPT

## 2024-11-22 PROCEDURE — 83001 ASSAY OF GONADOTROPIN (FSH): CPT

## 2024-11-22 PROCEDURE — 84481 FREE ASSAY (FT-3): CPT

## 2024-11-22 PROCEDURE — 84443 ASSAY THYROID STIM HORMONE: CPT

## 2024-11-22 PROCEDURE — 86376 MICROSOMAL ANTIBODY EACH: CPT

## 2024-11-22 PROCEDURE — 36415 COLL VENOUS BLD VENIPUNCTURE: CPT

## 2024-11-22 PROCEDURE — 82670 ASSAY OF TOTAL ESTRADIOL: CPT

## 2024-11-30 LAB
TESTOSTERONE FREE (CHAN): 219.7 PG/ML (ref 35–155)
TESTOSTERONE,TOTAL,LC-MS/MS: 1017 NG/DL (ref 250–1100)

## 2024-12-04 ENCOUNTER — APPOINTMENT (OUTPATIENT)
Dept: UROLOGY | Facility: CLINIC | Age: 64
End: 2024-12-04
Payer: COMMERCIAL

## 2024-12-12 ENCOUNTER — APPOINTMENT (OUTPATIENT)
Dept: PRIMARY CARE | Facility: CLINIC | Age: 64
End: 2024-12-12
Payer: COMMERCIAL

## 2024-12-12 VITALS
BODY MASS INDEX: 39.28 KG/M2 | WEIGHT: 290 LBS | HEIGHT: 72 IN | HEART RATE: 83 BPM | SYSTOLIC BLOOD PRESSURE: 125 MMHG | DIASTOLIC BLOOD PRESSURE: 80 MMHG

## 2024-12-12 DIAGNOSIS — I87.2 VENOUS INSUFFICIENCY: ICD-10-CM

## 2024-12-12 DIAGNOSIS — N40.0 BPH WITHOUT URINARY OBSTRUCTION: ICD-10-CM

## 2024-12-12 DIAGNOSIS — M19.90 ARTHRITIS: ICD-10-CM

## 2024-12-12 DIAGNOSIS — K21.9 GASTROESOPHAGEAL REFLUX DISEASE WITHOUT ESOPHAGITIS: ICD-10-CM

## 2024-12-12 DIAGNOSIS — E66.01 MORBID OBESITY (MULTI): ICD-10-CM

## 2024-12-12 DIAGNOSIS — Z23 NEED FOR INFLUENZA VACCINATION: ICD-10-CM

## 2024-12-12 DIAGNOSIS — M62.838 MUSCLE SPASM: ICD-10-CM

## 2024-12-12 DIAGNOSIS — E78.49 ESSENTIAL FAMILIAL HYPERLIPIDEMIA: ICD-10-CM

## 2024-12-12 DIAGNOSIS — E11.69 TYPE 2 DIABETES MELLITUS WITH OTHER SPECIFIED COMPLICATION, UNSPECIFIED WHETHER LONG TERM INSULIN USE (MULTI): ICD-10-CM

## 2024-12-12 DIAGNOSIS — J20.8 ACUTE BRONCHITIS DUE TO OTHER SPECIFIED ORGANISMS: ICD-10-CM

## 2024-12-12 DIAGNOSIS — I27.82 OTHER CHRONIC PULMONARY EMBOLISM, UNSPECIFIED WHETHER ACUTE COR PULMONALE PRESENT (MULTI): ICD-10-CM

## 2024-12-12 DIAGNOSIS — I10 BENIGN ESSENTIAL HYPERTENSION: Primary | ICD-10-CM

## 2024-12-12 DIAGNOSIS — M48.061 SPINAL STENOSIS, LUMBAR REGION, WITHOUT NEUROGENIC CLAUDICATION: ICD-10-CM

## 2024-12-12 LAB — POC HEMOGLOBIN A1C: 6.2 % (ref 4.2–6.5)

## 2024-12-12 PROCEDURE — 80053 COMPREHEN METABOLIC PANEL: CPT

## 2024-12-12 PROCEDURE — 3074F SYST BP LT 130 MM HG: CPT | Performed by: FAMILY MEDICINE

## 2024-12-12 PROCEDURE — 3048F LDL-C <100 MG/DL: CPT | Performed by: FAMILY MEDICINE

## 2024-12-12 PROCEDURE — 99214 OFFICE O/P EST MOD 30 MIN: CPT | Performed by: FAMILY MEDICINE

## 2024-12-12 PROCEDURE — 90471 IMMUNIZATION ADMIN: CPT | Performed by: FAMILY MEDICINE

## 2024-12-12 PROCEDURE — 1036F TOBACCO NON-USER: CPT | Performed by: FAMILY MEDICINE

## 2024-12-12 PROCEDURE — 90656 IIV3 VACC NO PRSV 0.5 ML IM: CPT | Performed by: FAMILY MEDICINE

## 2024-12-12 PROCEDURE — 80061 LIPID PANEL: CPT

## 2024-12-12 PROCEDURE — 3079F DIAST BP 80-89 MM HG: CPT | Performed by: FAMILY MEDICINE

## 2024-12-12 PROCEDURE — 85025 COMPLETE CBC W/AUTO DIFF WBC: CPT

## 2024-12-12 PROCEDURE — 3008F BODY MASS INDEX DOCD: CPT | Performed by: FAMILY MEDICINE

## 2024-12-12 PROCEDURE — 83036 HEMOGLOBIN GLYCOSYLATED A1C: CPT | Performed by: FAMILY MEDICINE

## 2024-12-12 RX ORDER — TIZANIDINE 4 MG/1
4 TABLET ORAL 3 TIMES DAILY
Qty: 270 TABLET | Refills: 0 | Status: SHIPPED | OUTPATIENT
Start: 2024-12-12

## 2024-12-12 RX ORDER — DOXYCYCLINE 100 MG/1
100 CAPSULE ORAL 2 TIMES DAILY
Qty: 14 CAPSULE | Refills: 0 | Status: SHIPPED | OUTPATIENT
Start: 2024-12-12 | End: 2024-12-19

## 2024-12-12 ASSESSMENT — ENCOUNTER SYMPTOMS
COUGH: 1
SINUS PAIN: 1
SINUS PRESSURE: 1
OCCASIONAL FEELINGS OF UNSTEADINESS: 1
DEPRESSION: 0
LOSS OF SENSATION IN FEET: 1
RHINORRHEA: 1
SORE THROAT: 1

## 2024-12-12 NOTE — PROGRESS NOTES
Subjective   Patient ID: Cj Vargas is a 64 y.o. male who presents for Annual Exam.    HPI sinus yousif and driange, htn, gerd, dm, chol, htn, venous insuff, arthritis , gerd    Review of Systems   HENT:  Positive for ear pain, postnasal drip, rhinorrhea, sinus pressure, sinus pain and sore throat.    Respiratory:  Positive for cough.    Musculoskeletal:         Severe arthritis ankles , knees and hips, pt w spc on lumbar spine        Objective   /80   Pulse 83   Ht 1.829 m (6')   Wt 132 kg (290 lb)   BMI 39.33 kg/m²     Physical Exam  Vitals reviewed.   Constitutional:       Appearance: Normal appearance. He is obese.   HENT:      Nose: Congestion and rhinorrhea present.   Eyes:      Extraocular Movements: Extraocular movements intact.      Conjunctiva/sclera: Conjunctivae normal.      Pupils: Pupils are equal, round, and reactive to light.   Cardiovascular:      Rate and Rhythm: Normal rate and regular rhythm.      Pulses: Normal pulses.      Heart sounds: Normal heart sounds.   Pulmonary:      Effort: Pulmonary effort is normal.      Breath sounds: Rhonchi present.   Abdominal:      General: Bowel sounds are normal.      Palpations: Abdomen is soft.   Musculoskeletal:         General: Normal range of motion.      Comments: Sig multi joint arhtiritis   Skin:     General: Skin is warm and dry.   Neurological:      General: No focal deficit present.      Mental Status: He is alert and oriented to person, place, and time. Mental status is at baseline.         Assessment/Plan   Problem List Items Addressed This Visit             ICD-10-CM    Acid reflux K21.9    Relevant Orders    CBC and Auto Differential    Arthritis M19.90    Benign essential hypertension - Primary I10    Relevant Orders    Comprehensive Metabolic Panel    BPH without urinary obstruction N40.0    Essential familial hyperlipidemia E78.49    Relevant Orders    Lipid Panel    Morbid obesity (Multi) E66.01    Pulmonary embolism I26.99     Spinal stenosis, lumbar region, without neurogenic claudication M48.061    Venous insufficiency I87.2     Other Visit Diagnoses         Codes    Need for influenza vaccination     Z23    Relevant Orders    Flu vaccine, trivalent, preservative free, age 6 months and greater (Fluraix/Fluzone/Flulaval) (Completed)    Type 2 diabetes mellitus with other specified complication, unspecified whether long term insulin use (Multi)     E11.69    Relevant Orders    POCT glycosylated hemoglobin (Hb A1C) manually resulted    Acute bronchitis due to other specified organisms     J20.8    Relevant Medications    doxycycline (Vibramycin) 100 mg capsule    Muscle spasm     M62.838    Relevant Medications    tiZANidine (Zanaflex) 4 mg tablet

## 2024-12-13 LAB
ALBUMIN SERPL BCP-MCNC: 4.4 G/DL (ref 3.4–5)
ALP SERPL-CCNC: 76 U/L (ref 33–136)
ALT SERPL W P-5'-P-CCNC: 52 U/L (ref 10–52)
ANION GAP SERPL CALC-SCNC: 14 MMOL/L (ref 10–20)
AST SERPL W P-5'-P-CCNC: 43 U/L (ref 9–39)
BASOPHILS # BLD AUTO: 0.02 X10*3/UL (ref 0–0.1)
BASOPHILS NFR BLD AUTO: 0.4 %
BILIRUB SERPL-MCNC: 0.7 MG/DL (ref 0–1.2)
BUN SERPL-MCNC: 13 MG/DL (ref 6–23)
CALCIUM SERPL-MCNC: 9.5 MG/DL (ref 8.6–10.6)
CHLORIDE SERPL-SCNC: 101 MMOL/L (ref 98–107)
CHOLEST SERPL-MCNC: 126 MG/DL (ref 0–199)
CHOLESTEROL/HDL RATIO: 3.9
CO2 SERPL-SCNC: 26 MMOL/L (ref 21–32)
CREAT SERPL-MCNC: 1.03 MG/DL (ref 0.5–1.3)
EGFRCR SERPLBLD CKD-EPI 2021: 81 ML/MIN/1.73M*2
EOSINOPHIL # BLD AUTO: 0.1 X10*3/UL (ref 0–0.7)
EOSINOPHIL NFR BLD AUTO: 1.9 %
ERYTHROCYTE [DISTWIDTH] IN BLOOD BY AUTOMATED COUNT: 12.6 % (ref 11.5–14.5)
GLUCOSE SERPL-MCNC: 133 MG/DL (ref 74–99)
HCT VFR BLD AUTO: 50.9 % (ref 41–52)
HDLC SERPL-MCNC: 32.5 MG/DL
HGB BLD-MCNC: 16.4 G/DL (ref 13.5–17.5)
IMM GRANULOCYTES # BLD AUTO: 0.01 X10*3/UL (ref 0–0.7)
IMM GRANULOCYTES NFR BLD AUTO: 0.2 % (ref 0–0.9)
LDLC SERPL CALC-MCNC: 56 MG/DL
LYMPHOCYTES # BLD AUTO: 1.17 X10*3/UL (ref 1.2–4.8)
LYMPHOCYTES NFR BLD AUTO: 22.8 %
MCH RBC QN AUTO: 31.4 PG (ref 26–34)
MCHC RBC AUTO-ENTMCNC: 32.2 G/DL (ref 32–36)
MCV RBC AUTO: 97 FL (ref 80–100)
MONOCYTES # BLD AUTO: 0.54 X10*3/UL (ref 0.1–1)
MONOCYTES NFR BLD AUTO: 10.5 %
NEUTROPHILS # BLD AUTO: 3.29 X10*3/UL (ref 1.2–7.7)
NEUTROPHILS NFR BLD AUTO: 64.2 %
NON HDL CHOLESTEROL: 94 MG/DL (ref 0–149)
NRBC BLD-RTO: 0 /100 WBCS (ref 0–0)
PLATELET # BLD AUTO: 205 X10*3/UL (ref 150–450)
POTASSIUM SERPL-SCNC: 4.1 MMOL/L (ref 3.5–5.3)
PROT SERPL-MCNC: 7.1 G/DL (ref 6.4–8.2)
RBC # BLD AUTO: 5.23 X10*6/UL (ref 4.5–5.9)
SODIUM SERPL-SCNC: 137 MMOL/L (ref 136–145)
TRIGL SERPL-MCNC: 189 MG/DL (ref 0–149)
VLDL: 38 MG/DL (ref 0–40)
WBC # BLD AUTO: 5.1 X10*3/UL (ref 4.4–11.3)

## 2024-12-13 RX ORDER — TIZANIDINE 4 MG/1
4 TABLET ORAL 3 TIMES DAILY
Qty: 270 TABLET | Refills: 0 | OUTPATIENT
Start: 2024-12-13

## 2024-12-13 NOTE — TELEPHONE ENCOUNTER
----- Message from Damion Ruiz sent at 12/13/2024  2:04 PM EST -----  All results are stable  no change

## 2025-01-10 ENCOUNTER — APPOINTMENT (OUTPATIENT)
Dept: UROLOGY | Facility: CLINIC | Age: 65
End: 2025-01-10
Payer: COMMERCIAL

## 2025-01-15 ENCOUNTER — APPOINTMENT (OUTPATIENT)
Dept: UROLOGY | Facility: CLINIC | Age: 65
End: 2025-01-15
Payer: COMMERCIAL

## 2025-01-15 VITALS — WEIGHT: 292 LBS | TEMPERATURE: 97.1 F | HEIGHT: 73 IN | BODY MASS INDEX: 38.7 KG/M2

## 2025-01-15 DIAGNOSIS — R33.9 URINARY RETENTION WITH INCOMPLETE BLADDER EMPTYING: Primary | ICD-10-CM

## 2025-01-15 DIAGNOSIS — R97.20 ELEVATED PSA: ICD-10-CM

## 2025-01-15 DIAGNOSIS — N20.0 KIDNEY STONES: ICD-10-CM

## 2025-01-15 PROCEDURE — 3008F BODY MASS INDEX DOCD: CPT | Performed by: NURSE PRACTITIONER

## 2025-01-15 PROCEDURE — G2211 COMPLEX E/M VISIT ADD ON: HCPCS | Performed by: NURSE PRACTITIONER

## 2025-01-15 PROCEDURE — 51798 US URINE CAPACITY MEASURE: CPT | Performed by: NURSE PRACTITIONER

## 2025-01-15 PROCEDURE — 1036F TOBACCO NON-USER: CPT | Performed by: NURSE PRACTITIONER

## 2025-01-15 PROCEDURE — 99213 OFFICE O/P EST LOW 20 MIN: CPT | Performed by: NURSE PRACTITIONER

## 2025-01-15 ASSESSMENT — PAIN SCALES - GENERAL: PAINLEVEL_OUTOF10: 0-NO PAIN

## 2025-01-15 NOTE — PROGRESS NOTES
[facility]  Outpatient Clinic Note    Subjective   Cj Vargas is a 64 y.o. male    History of Present Illness   Patient presenting to clinic today NPV. History of RADHA,   HTN, ED, Hypogonadism, remote history kidney stones, and Urinary retention.   Patient has no  bothersome urinary symptoms. Patient denies gross hematuria, dysuria, frequency, fevers, n/v, or flank pain.    6/12/2023 PSA 1.80 ng/dl  7/30/2024 PSA 3.06 ng/dl     UA: Unable to void   PVR: 4 ml     Past Medical History and Surgical History   Past Medical History:   Diagnosis Date    Anemia     Arthritis 2010    BPH (benign prostatic hyperplasia)     Cataract 2020    CPAP (continuous positive airway pressure) dependence 2000    Depression     GERD (gastroesophageal reflux disease)     Hyperlipidemia     Hypertension     Low back pain     Neuropathy     Obese     RADHA on CPAP     Panic disorder     Pulmonary embolism on long-term anticoagulation therapy (Multi) 2021    Eliquis    Recurrent deep vein thrombosis (DVT) (Multi)     Most recently 10/2023    Spinal stenosis 2015     Past Surgical History:   Procedure Laterality Date    ANKLE Right 12/2021    arthroscopy    ANKLE Right 2023    athroscopy    APPENDECTOMY  1970    COLONOSCOPY      GASTRIC BYPASS  2017    sleeve gastrectomy    KNEE ARTHROSCOPY W/ DEBRIDEMENT Right 2018    LUMBAR FUSION      l3-4    TONSILLECTOMY  1974    TOTAL KNEE ARTHROPLASTY Right 11/28/2023    TOTAL KNEE ARTHROPLASTY Left 2013       Medications  Current Outpatient Medications on File Prior to Visit   Medication Sig Dispense Refill    apixaban (Eliquis) 5 mg tablet Take 1 tablet (5 mg) by mouth 2 times a day.      buPROPion (Wellbutrin) 100 mg tablet TAKE 1 TABLET 3 TIMES A DAY (Patient taking differently: Take 1 tablet (100 mg) by mouth 3 times a day.) 270 tablet 1    cholecalciferol, vitamin D3, 250 mcg (10,000 unit) tablet Take 1 tablet (250 mcg) by mouth every other day.      Eliquis 5 mg tablet TAKE 1 TABLET  TWICE A  tablet 1    enoxaparin (Lovenox) 120 mg/0.8 mL syringe Inject 0.8 mL (120 mg) under the skin every 12 hours. 6 each 0    gabapentin (Neurontin) 600 mg tablet TAKE 1 TABLET 3 TIMES A  tablet 1    irbesartan-hydrochlorothiazide (Avalide) 150-12.5 mg tablet TAKE 1 TABLET ONCE DAILY 90 tablet 1    methocarbamol (Robaxin) 500 mg tablet Take 2 tablets (1,000 mg) by mouth every 8 hours for 10 days. 60 tablet 0    methocarbamol (Robaxin) 500 mg tablet Take 2 tablets (1,000 mg) by mouth every 8 hours if needed for muscle spasms. 60 tablet 0    multivitamin tablet Take 1 tablet by mouth once daily.      omeprazole (PriLOSEC) 40 mg DR capsule TAKE 1 CAPSULE DAILY. DO   NOT CRUSH OR CHEW. 90 capsule 1    polyethylene glycol (Glycolax, Miralax) 17 gram/dose powder Take 17 g by mouth 2 times a day as needed (constipation) for up to 10 days. 510 g 0    QUEtiapine (SEROquel) 50 mg tablet TAKE 1 TABLET ONCE DAILY ATBEDTIME 90 tablet 1    rosuvastatin (Crestor) 10 mg tablet TAKE 1 TABLET ONCE DAILY 90 tablet 1    sildenafil (Viagra) 100 mg tablet Take 1 tablet (100 mg) by mouth once daily as needed for erectile dysfunction. 12 tablet 3    tiZANidine (Zanaflex) 4 mg tablet Take 1 tablet (4 mg) by mouth 3 times a day. 270 tablet 0     No current facility-administered medications on file prior to visit.       Objective   Physicial Exam  General: Well developed, well nourished, alert and cooperative, appears in no acute distress  Eyes: Non-injected conjunctiva, sclera clear, no proptosis  Cardiac: Extremities are warm and well perfused. No edema, cyanosis or pallor.   Lungs: Breathing is easy, non-labored. Speaking in clear and complete sentences. Normal diaphragmatic movement.  MSK: Ambulatory with steady gait, unassisted  Neuro: alert and oriented to person, place and time  Psych: Demonstrates good judgement and reason, without hallucinations, abnormal affect or abnormal behaviors.  Skin: no obvious lesions, no  rashes.    No visits with results within 30 Day(s) from this visit.   Latest known visit with results is:   Office Visit on 12/12/2024   Component Date Value Ref Range Status    WBC 12/12/2024 5.1  4.4 - 11.3 x10*3/uL Final    nRBC 12/12/2024 0.0  0.0 - 0.0 /100 WBCs Final    RBC 12/12/2024 5.23  4.50 - 5.90 x10*6/uL Final    Hemoglobin 12/12/2024 16.4  13.5 - 17.5 g/dL Final    Hematocrit 12/12/2024 50.9  41.0 - 52.0 % Final    MCV 12/12/2024 97  80 - 100 fL Final    MCH 12/12/2024 31.4  26.0 - 34.0 pg Final    MCHC 12/12/2024 32.2  32.0 - 36.0 g/dL Final    RDW 12/12/2024 12.6  11.5 - 14.5 % Final    Platelets 12/12/2024 205  150 - 450 x10*3/uL Final    Neutrophils % 12/12/2024 64.2  40.0 - 80.0 % Final    Immature Granulocytes %, Automated 12/12/2024 0.2  0.0 - 0.9 % Final    Immature Granulocyte Count (IG) includes promyelocytes, myelocytes and metamyelocytes but does not include bands. Percent differential counts (%) should be interpreted in the context of the absolute cell counts (cells/UL).    Lymphocytes % 12/12/2024 22.8  13.0 - 44.0 % Final    Monocytes % 12/12/2024 10.5  2.0 - 10.0 % Final    Eosinophils % 12/12/2024 1.9  0.0 - 6.0 % Final    Basophils % 12/12/2024 0.4  0.0 - 2.0 % Final    Neutrophils Absolute 12/12/2024 3.29  1.20 - 7.70 x10*3/uL Final    Percent differential counts (%) should be interpreted in the context of the absolute cell counts (cells/uL).    Immature Granulocytes Absolute, Au* 12/12/2024 0.01  0.00 - 0.70 x10*3/uL Final    Lymphocytes Absolute 12/12/2024 1.17 (L)  1.20 - 4.80 x10*3/uL Final    Monocytes Absolute 12/12/2024 0.54  0.10 - 1.00 x10*3/uL Final    Eosinophils Absolute 12/12/2024 0.10  0.00 - 0.70 x10*3/uL Final    Basophils Absolute 12/12/2024 0.02  0.00 - 0.10 x10*3/uL Final    Glucose 12/12/2024 133 (H)  74 - 99 mg/dL Final    Sodium 12/12/2024 137  136 - 145 mmol/L Final    Potassium 12/12/2024 4.1  3.5 - 5.3 mmol/L Final    Chloride 12/12/2024 101  98 - 107 mmol/L  Final    Bicarbonate 12/12/2024 26  21 - 32 mmol/L Final    Anion Gap 12/12/2024 14  10 - 20 mmol/L Final    Urea Nitrogen 12/12/2024 13  6 - 23 mg/dL Final    Creatinine 12/12/2024 1.03  0.50 - 1.30 mg/dL Final    eGFR 12/12/2024 81  >60 mL/min/1.73m*2 Final    Calculations of estimated GFR are performed using the 2021 CKD-EPI Study Refit equation without the race variable for the IDMS-Traceable creatinine methods.  https://jasn.asnjournals.org/content/early/2021/09/22/ASN.4238280705    Calcium 12/12/2024 9.5  8.6 - 10.6 mg/dL Final    Albumin 12/12/2024 4.4  3.4 - 5.0 g/dL Final    Alkaline Phosphatase 12/12/2024 76  33 - 136 U/L Final    Total Protein 12/12/2024 7.1  6.4 - 8.2 g/dL Final    AST 12/12/2024 43 (H)  9 - 39 U/L Final    Bilirubin, Total 12/12/2024 0.7  0.0 - 1.2 mg/dL Final    ALT 12/12/2024 52  10 - 52 U/L Final    Patients treated with Sulfasalazine may generate falsely decreased results for ALT.    Cholesterol 12/12/2024 126  0 - 199 mg/dL Final          Age      Desirable   Borderline High   High     0-19 Y     0 - 169       170 - 199     >/= 200    20-24 Y     0 - 189       190 - 224     >/= 225         >24 Y     0 - 199       200 - 239     >/= 240   **All ranges are based on fasting samples. Specific   therapeutic targets will vary based on patient-specific   cardiac risk.    Pediatric guidelines reference:Pediatrics 2011, 128(S5).Adult guidelines reference: NCEP ATPIII Guidelines,GENA 2001, 258:2486-97    Venipuncture immediately after or during the administration of Metamizole may lead to falsely low results. Testing should be performed immediately prior to Metamizole dosing.    HDL-Cholesterol 12/12/2024 32.5  mg/dL Final      Age       Very Low   Low     Normal    High    0-19 Y    < 35      < 40     40-45     ----  20-24 Y    ----     < 40      >45      ----        >24 Y      ----     < 40     40-60      >60      Cholesterol/HDL Ratio 12/12/2024 3.9   Final      Ref Values  Desirable  <  3.4  High Risk  > 5.0    LDL Calculated 12/12/2024 56  <=99 mg/dL Final                                Near   Borderline      AGE      Desirable  Optimal    High     High     Very High     0-19 Y     0 - 109     ---    110-129   >/= 130     ----    20-24 Y     0 - 119     ---    120-159   >/= 160     ----      >24 Y     0 -  99   100-129  130-159   160-189     >/=190      VLDL 12/12/2024 38  0 - 40 mg/dL Final    Triglycerides 12/12/2024 189 (H)  0 - 149 mg/dL Final    Age              Desirable        Borderline         High        Very High  SEX:B           mg/dL             mg/dL               mg/dL      mg/dL  <=14D                       ----               ----        ----  15D-365D                    ----               ----        ----  1Y-9Y           0-74               75-99             >=100       ----  10Y-19Y        0-89                            >=130       ----  20Y-24Y        0-114             115-149             >=150      ----  >= 25Y         0-149             150-199             200-499    >=500      Venipuncture immediately after or during the administration of Metamizole may lead to falsely low results. Testing should be performed immediately prior to Metamizole dosing.    Non HDL Cholesterol 12/12/2024 94  0 - 149 mg/dL Final          Age       Desirable   Borderline High   High     Very High     0-19 Y     0 - 119       120 - 144     >/= 145    >/= 160    20-24 Y     0 - 149       150 - 189     >/= 190      ----         >24 Y    30 mg/dL above LDL Cholesterol goal      POC HEMOGLOBIN A1c 12/12/2024 6.2  4.2 - 6.5 % Final      Review of Systems  All other systems have been reviewed and are negative for complaint.      Assessment and Plan     - Elevated PSA   Today we discussed PSA as a tool to screen gentleman for prostate cancer. We discussed that many factors can elevate the PSA, and when the PSA is elevated further testing is warranted.We discussed the patient's PSA as well  as the controversy of using PSA for screening, especially past the age of 70 years old. I explained that while PSA is used for  prostate cancer screening, it is not specific to prostate cancer and can be elevated with benign growth of the prostate. We discussed options of proceeding with repeating MRI, proceeding with transrectal ultrasound prostate biopsy, or continuing to monitor his PSA.     Please obtain PSA. Follow up in 2 to 3 weeks to review     All questions and concerns were addressed. Patient verbalizes understanding and has no other questions at this time.     Sharon Roe-- ASHLEY DONATO  Office Phone:  [phone]

## 2025-03-05 DIAGNOSIS — E78.5 DYSLIPIDEMIA: ICD-10-CM

## 2025-03-05 DIAGNOSIS — G62.9 NEUROPATHY: ICD-10-CM

## 2025-03-05 RX ORDER — GABAPENTIN 600 MG/1
600 TABLET ORAL 3 TIMES DAILY
Qty: 270 TABLET | Refills: 1 | Status: SHIPPED | OUTPATIENT
Start: 2025-03-05

## 2025-03-05 RX ORDER — ROSUVASTATIN CALCIUM 10 MG/1
10 TABLET, COATED ORAL DAILY
Qty: 90 TABLET | Refills: 1 | Status: SHIPPED | OUTPATIENT
Start: 2025-03-05

## 2025-03-06 ENCOUNTER — HOSPITAL ENCOUNTER (OUTPATIENT)
Dept: RADIOLOGY | Facility: CLINIC | Age: 65
Discharge: HOME | End: 2025-03-06
Payer: COMMERCIAL

## 2025-03-06 ENCOUNTER — OFFICE VISIT (OUTPATIENT)
Dept: ORTHOPEDIC SURGERY | Facility: CLINIC | Age: 65
End: 2025-03-06
Payer: COMMERCIAL

## 2025-03-06 DIAGNOSIS — Z96.651 S/P TOTAL KNEE REPLACEMENT, RIGHT: ICD-10-CM

## 2025-03-06 DIAGNOSIS — M25.879 ANKLE IMPINGEMENT SYNDROME, UNSPECIFIED LATERALITY: ICD-10-CM

## 2025-03-06 DIAGNOSIS — M19.072 ARTHRITIS OF BOTH ANKLES: Primary | ICD-10-CM

## 2025-03-06 DIAGNOSIS — M19.071 ARTHRITIS OF BOTH ANKLES: Primary | ICD-10-CM

## 2025-03-06 PROCEDURE — 73562 X-RAY EXAM OF KNEE 3: CPT | Mod: RT

## 2025-03-06 PROCEDURE — 2500000004 HC RX 250 GENERAL PHARMACY W/ HCPCS (ALT 636 FOR OP/ED): Performed by: ORTHOPAEDIC SURGERY

## 2025-03-06 PROCEDURE — 99214 OFFICE O/P EST MOD 30 MIN: CPT | Performed by: ORTHOPAEDIC SURGERY

## 2025-03-06 PROCEDURE — 20605 DRAIN/INJ JOINT/BURSA W/O US: CPT | Mod: RT | Performed by: ORTHOPAEDIC SURGERY

## 2025-03-06 PROCEDURE — 20605 DRAIN/INJ JOINT/BURSA W/O US: CPT | Mod: LT | Performed by: ORTHOPAEDIC SURGERY

## 2025-03-06 RX ORDER — METHYLPREDNISOLONE ACETATE 40 MG/ML
40 INJECTION, SUSPENSION INTRA-ARTICULAR; INTRALESIONAL; INTRAMUSCULAR; SOFT TISSUE
Status: COMPLETED | OUTPATIENT
Start: 2025-03-06 | End: 2025-03-06

## 2025-03-06 RX ORDER — LIDOCAINE HYDROCHLORIDE 20 MG/ML
1 INJECTION, SOLUTION INFILTRATION; PERINEURAL
Status: COMPLETED | OUTPATIENT
Start: 2025-03-06 | End: 2025-03-06

## 2025-03-06 RX ADMIN — METHYLPREDNISOLONE ACETATE 40 MG: 40 INJECTION, SUSPENSION INTRALESIONAL; INTRAMUSCULAR; INTRASYNOVIAL; SOFT TISSUE at 16:58

## 2025-03-06 RX ADMIN — LIDOCAINE HYDROCHLORIDE 1 ML: 20 INJECTION, SOLUTION INFILTRATION; PERINEURAL at 16:58

## 2025-03-06 NOTE — PROGRESS NOTES
Returns for right knee and both ankles.  Right knee is doing very well.  He has no complaints with activity.  Has been having pain across the front of both ankles recently.  Got good relief with cortisone injection in the past would like another one today if possible.    Exam: Right knee: Near full extension.  Good flexion towards 120.  Good quad strength.  No crepitus.  Examination of both ankle shows anterior tibiotalar tenderness.  Crepitus with active motion.  Supple subtalar joint.    I personally reviewed the following radiographic exams: Right knee shows well-fixed well aligned cemented total knee.    Assessment: Status post right total knee, 1 year.  Bilateral ankle impingement, probable arthrosis.    Plan: Discussed nonoperative and operative options in detail.   Risk and benefits discussed in detail. All questions answered today.  Recovery timeline and expectations discussed in detail.  Discussed antibiotic prophylaxis for the knee.  No restrictions from our standpoint.  Follow-up in the knee in the future if any problems arise.  Offered cortisone injection in both ankles.  Does not had weightbearing films in a while and if pain continues, would recommend he follow-up for repeat x-rays of his ankle.  After informed consent under sterile conditions the right ankle was injected with a combination of  1cc 2% lidocaine and 40mg Methylprednisolone.  Risks and benefits were discussed in detail.  After informed consent under sterile conditions the left ankle was injected with a combination of 1cc 2% lidocaine and 40mg Methylprednisolone. Risks and benefits were discussed in detail.  Patient ID: Cj Vargsa is a 64 y.o. male.    M Inj/Asp: R ankle on 3/6/2025 4:58 PM  Indications: pain  Details: 22 G needle, anterior approach  Medications: 40 mg methylPREDNISolone acetate 40 mg/mL; 1 mL lidocaine 20 mg/mL (2 %)  Procedure, treatment alternatives, risks and benefits explained, specific risks discussed. Consent  was given by the patient. Immediately prior to procedure a time out was called to verify the correct patient, procedure, equipment, support staff and site/side marked as required. Patient was prepped and draped in the usual sterile fashion.       M Inj/Asp: L ankle on 3/6/2025 4:58 PM  Indications: pain  Details: 22 G needle, anterior approach  Medications: 40 mg methylPREDNISolone acetate 40 mg/mL; 1 mL lidocaine 20 mg/mL (2 %)  Procedure, treatment alternatives, risks and benefits explained, specific risks discussed. Consent was given by the patient. Immediately prior to procedure a time out was called to verify the correct patient, procedure, equipment, support staff and site/side marked as required. Patient was prepped and draped in the usual sterile fashion.

## 2025-03-07 LAB
PSA FREE MFR SERPL: 11 % (CALC)
PSA FREE SERPL-MCNC: 0.5 NG/ML
PSA SERPL-MCNC: 4.5 NG/ML

## 2025-03-10 ENCOUNTER — TELEPHONE (OUTPATIENT)
Dept: UROLOGY | Facility: HOSPITAL | Age: 65
End: 2025-03-10
Payer: COMMERCIAL

## 2025-03-10 DIAGNOSIS — R97.20 ELEVATED PSA: Primary | ICD-10-CM

## 2025-03-10 NOTE — TELEPHONE ENCOUNTER
Spoke with patient regarding elevated PSA results. He will repeat PSA in 6 to 8 weeks. I will call with results. Consider Prostate MRI if remains elevated.

## 2025-03-18 ENCOUNTER — PATIENT MESSAGE (OUTPATIENT)
Dept: PRIMARY CARE | Facility: CLINIC | Age: 65
End: 2025-03-18
Payer: COMMERCIAL

## 2025-03-18 DIAGNOSIS — M62.838 MUSCLE SPASM: ICD-10-CM

## 2025-03-19 DIAGNOSIS — I82.431 ACUTE DEEP VEIN THROMBOSIS (DVT) OF POPLITEAL VEIN OF RIGHT LOWER EXTREMITY (MULTI): ICD-10-CM

## 2025-03-19 DIAGNOSIS — I10 BENIGN ESSENTIAL HYPERTENSION: ICD-10-CM

## 2025-03-19 DIAGNOSIS — F41.8 DEPRESSION WITH ANXIETY: ICD-10-CM

## 2025-03-19 RX ORDER — QUETIAPINE FUMARATE 50 MG/1
50 TABLET, FILM COATED ORAL NIGHTLY
Qty: 90 TABLET | Refills: 1 | Status: SHIPPED | OUTPATIENT
Start: 2025-03-19

## 2025-03-19 RX ORDER — TIZANIDINE 4 MG/1
4 TABLET ORAL 3 TIMES DAILY
Qty: 270 TABLET | Refills: 0 | Status: SHIPPED | OUTPATIENT
Start: 2025-03-19

## 2025-03-19 RX ORDER — IRBESARTAN AND HYDROCHLOROTHIAZIDE 150; 12.5 MG/1; MG/1
1 TABLET, FILM COATED ORAL DAILY
Qty: 90 TABLET | Refills: 1 | Status: SHIPPED | OUTPATIENT
Start: 2025-03-19

## 2025-03-19 RX ORDER — APIXABAN 5 MG/1
5 TABLET, FILM COATED ORAL 2 TIMES DAILY
Qty: 180 TABLET | Refills: 1 | Status: SHIPPED | OUTPATIENT
Start: 2025-03-19

## 2025-04-15 ENCOUNTER — APPOINTMENT (OUTPATIENT)
Dept: PRIMARY CARE | Facility: CLINIC | Age: 65
End: 2025-04-15
Payer: COMMERCIAL

## 2025-04-15 VITALS
SYSTOLIC BLOOD PRESSURE: 139 MMHG | HEIGHT: 73 IN | DIASTOLIC BLOOD PRESSURE: 88 MMHG | BODY MASS INDEX: 39.76 KG/M2 | WEIGHT: 300 LBS | HEART RATE: 76 BPM

## 2025-04-15 DIAGNOSIS — I10 BENIGN ESSENTIAL HYPERTENSION: Primary | ICD-10-CM

## 2025-04-15 DIAGNOSIS — E78.49 ESSENTIAL FAMILIAL HYPERLIPIDEMIA: ICD-10-CM

## 2025-04-15 DIAGNOSIS — Z79.01 ANTICOAGULANT LONG-TERM USE: ICD-10-CM

## 2025-04-15 DIAGNOSIS — M51.16 LUMBAR DISC DISEASE WITH RADICULOPATHY: ICD-10-CM

## 2025-04-15 DIAGNOSIS — E66.812 OBESITY, CLASS II, BMI 35-39.9: ICD-10-CM

## 2025-04-15 DIAGNOSIS — K21.9 GASTROESOPHAGEAL REFLUX DISEASE WITHOUT ESOPHAGITIS: ICD-10-CM

## 2025-04-15 DIAGNOSIS — G47.33 OBSTRUCTIVE SLEEP APNEA SYNDROME IN ADULT: ICD-10-CM

## 2025-04-15 DIAGNOSIS — E11.69 TYPE 2 DIABETES MELLITUS WITH OTHER SPECIFIED COMPLICATION, UNSPECIFIED WHETHER LONG TERM INSULIN USE (MULTI): ICD-10-CM

## 2025-04-15 LAB — POC HEMOGLOBIN A1C: 5.9 % (ref 4.2–6.5)

## 2025-04-15 PROCEDURE — 83036 HEMOGLOBIN GLYCOSYLATED A1C: CPT | Performed by: FAMILY MEDICINE

## 2025-04-15 PROCEDURE — 3008F BODY MASS INDEX DOCD: CPT | Performed by: FAMILY MEDICINE

## 2025-04-15 PROCEDURE — 3079F DIAST BP 80-89 MM HG: CPT | Performed by: FAMILY MEDICINE

## 2025-04-15 PROCEDURE — 99214 OFFICE O/P EST MOD 30 MIN: CPT | Performed by: FAMILY MEDICINE

## 2025-04-15 PROCEDURE — 3044F HG A1C LEVEL LT 7.0%: CPT | Performed by: FAMILY MEDICINE

## 2025-04-15 PROCEDURE — 3075F SYST BP GE 130 - 139MM HG: CPT | Performed by: FAMILY MEDICINE

## 2025-04-15 PROCEDURE — 1036F TOBACCO NON-USER: CPT | Performed by: FAMILY MEDICINE

## 2025-04-15 RX ORDER — TIZANIDINE 4 MG/1
4 TABLET ORAL EVERY 8 HOURS PRN
Qty: 90 TABLET | Refills: 2 | Status: SHIPPED | OUTPATIENT
Start: 2025-04-15 | End: 2025-05-15

## 2025-04-15 ASSESSMENT — ENCOUNTER SYMPTOMS
APNEA: 1
CONSTITUTIONAL NEGATIVE: 1
GASTROINTESTINAL NEGATIVE: 1
LOSS OF SENSATION IN FEET: 1
OCCASIONAL FEELINGS OF UNSTEADINESS: 1
NEUROLOGICAL NEGATIVE: 1
MUSCULOSKELETAL NEGATIVE: 1

## 2025-04-15 NOTE — PROGRESS NOTES
"Subjective   Patient ID: Cj Vargas is a 64 y.o. male who presents for No chief complaint on file..    HPI chol, gerd , ang, htn, isnomnia     Review of Systems   Constitutional: Negative.    HENT: Negative.     Respiratory:  Positive for apnea.    Cardiovascular:  Positive for leg swelling.        Venous inusff stable    Gastrointestinal: Negative.    Musculoskeletal: Negative.    Neurological: Negative.        Objective   /88   Pulse 76   Ht 1.854 m (6' 1\")   Wt 136 kg (300 lb)   BMI 39.58 kg/m²     Physical Exam  Vitals reviewed.   Constitutional:       Appearance: Normal appearance. He is normal weight.   Eyes:      Extraocular Movements: Extraocular movements intact.      Conjunctiva/sclera: Conjunctivae normal.      Pupils: Pupils are equal, round, and reactive to light.   Cardiovascular:      Rate and Rhythm: Normal rate and regular rhythm.      Pulses: Normal pulses.      Heart sounds: Normal heart sounds.   Pulmonary:      Effort: Pulmonary effort is normal.      Breath sounds: Normal breath sounds.   Abdominal:      General: Bowel sounds are normal.      Palpations: Abdomen is soft.   Musculoskeletal:         General: Normal range of motion.   Skin:     General: Skin is warm and dry.   Neurological:      General: No focal deficit present.      Mental Status: He is alert and oriented to person, place, and time. Mental status is at baseline.         Assessment/Plan   Problem List Items Addressed This Visit             ICD-10-CM    Acid reflux K21.9    Anticoagulant long-term use Z79.01    Benign essential hypertension - Primary I10    Essential familial hyperlipidemia E78.49    Obesity, Class II, BMI 35-39.9 E66.812    Obstructive sleep apnea syndrome in adult G47.33    Relevant Orders    In-Center Sleep Study (Non-Sleep Provider)     Other Visit Diagnoses         Codes    Type 2 diabetes mellitus with other specified complication, unspecified whether long term insulin use (Multi)     E11.69    " Relevant Orders    POCT glycosylated hemoglobin (Hb A1C) manually resulted    Lumbar disc disease with radiculopathy     M51.16    Relevant Medications    tiZANidine (Zanaflex) 4 mg tablet

## 2025-04-15 NOTE — PROGRESS NOTES
Pt comes in for fu on meds. Pt also had a home sleep study done and then was required to do an in-hospital study that he was not able to make and has to reschedule.

## 2025-04-30 ENCOUNTER — APPOINTMENT (OUTPATIENT)
Dept: ORTHOPEDIC SURGERY | Facility: CLINIC | Age: 65
End: 2025-04-30
Payer: COMMERCIAL

## 2025-04-30 DIAGNOSIS — M48.061 DEGENERATIVE LUMBAR SPINAL STENOSIS: ICD-10-CM

## 2025-04-30 PROCEDURE — 99213 OFFICE O/P EST LOW 20 MIN: CPT | Performed by: ORTHOPAEDIC SURGERY

## 2025-04-30 ASSESSMENT — PAIN - FUNCTIONAL ASSESSMENT: PAIN_FUNCTIONAL_ASSESSMENT: 0-10

## 2025-04-30 ASSESSMENT — PAIN SCALES - GENERAL: PAINLEVEL_OUTOF10: 6

## 2025-04-30 NOTE — LETTER
April 30, 2025     Damion Ruiz MD  [address] [place] [address] [address]  [address]   [id number]    Patient: Cj Vargas   YOB: 1960   Date of Visit: 4/30/2025       Dear Dr. Damion Ruiz MD:    Thank you for referring Cj Vargas to me for evaluation. Below are my notes for this consultation.  If you have questions, please do not hesitate to call me. I look forward to following your patient along with you.       Sincerely,     Chin Morris MD      CC: No Recipients  ______________________________________________________________________________________    Dileep is nearly a year out from surgery and he is doing well.  He does feel that surgery helped him significantly.    He does have bilateral low back pain and occasional pain in the both buttocks.    He has been active with physical therapy.    On exam, he looks good.  His posture is upright.  His gait is stable.  Strength is intact in the lower extremities.    Prior plain films have shown solid fusion.    He does have low back pain.  This does not appear to be significant radicular pain in nature.    I would suggest an ongoing conservative approach to include conditioning and exercise and weight reduction.  We will refer him for additional therapy which he would like.    Will ask him to see Dr. Poe.  Possibly bilateral SI joint injections would be an option to help some with the paraspinal pain in his low back.    He will keep us updated on his progress.    ** Dictated with voice recognition software and not immediately reviewed for errors in grammar and/or spelling **

## 2025-04-30 NOTE — PROGRESS NOTES
Dileep is nearly a year out from surgery and he is doing well.  He does feel that surgery helped him significantly.    He does have bilateral low back pain and occasional pain in the both buttocks.    He has been active with physical therapy.    On exam, he looks good.  His posture is upright.  His gait is stable.  Strength is intact in the lower extremities.    Prior plain films have shown solid fusion.    He does have low back pain.  This does not appear to be significant radicular pain in nature.    I would suggest an ongoing conservative approach to include conditioning and exercise and weight reduction.  We will refer him for additional therapy which he would like.    Will ask him to see Dr. Poe.  Possibly bilateral SI joint injections would be an option to help some with the paraspinal pain in his low back.    He will keep us updated on his progress.    ** Dictated with voice recognition software and not immediately reviewed for errors in grammar and/or spelling **

## 2025-05-10 DIAGNOSIS — R97.20 ELEVATED PSA: ICD-10-CM

## 2025-05-13 DIAGNOSIS — R29.818 SUSPECTED SLEEP APNEA: ICD-10-CM

## 2025-05-13 DIAGNOSIS — G47.33 OSA ON CPAP: ICD-10-CM

## 2025-05-16 ENCOUNTER — OFFICE VISIT (OUTPATIENT)
Dept: ORTHOPEDIC SURGERY | Facility: HOSPITAL | Age: 65
End: 2025-05-16
Payer: COMMERCIAL

## 2025-05-16 DIAGNOSIS — G56.31 RADIAL NERVE COMPRESSION, RIGHT: ICD-10-CM

## 2025-05-16 DIAGNOSIS — G56.01 CARPAL TUNNEL SYNDROME ON RIGHT: ICD-10-CM

## 2025-05-16 PROCEDURE — 99213 OFFICE O/P EST LOW 20 MIN: CPT | Performed by: ORTHOPAEDIC SURGERY

## 2025-05-16 PROCEDURE — 99202 OFFICE O/P NEW SF 15 MIN: CPT | Performed by: ORTHOPAEDIC SURGERY

## 2025-05-16 PROCEDURE — 1036F TOBACCO NON-USER: CPT | Performed by: ORTHOPAEDIC SURGERY

## 2025-05-16 ASSESSMENT — PAIN - FUNCTIONAL ASSESSMENT: PAIN_FUNCTIONAL_ASSESSMENT: NO/DENIES PAIN

## 2025-05-16 NOTE — PROGRESS NOTES
CHIEF COMPLAINT         Bilateral CTS    ASSESSMENT + PLAN     ***        HISTORY OF PRESENT ILLNESS       Patient returns today, ***      PHYSICAL EXAM       ***      IMAGING / LABS / EMGs      ***      Electronically Signed      IVIS Hope MD      Orthopaedic Hand Surgery      [phone]

## 2025-05-16 NOTE — LETTER
but not clearly radiating to the dorsal hand.  No epicondylitis signs.  No triggering.  No ulnar motor signs.  Cervical range of motion is little bit limited in both rotation and lateral bend but does not reproduce discomfort in the hand.  Sensation intact to light touch in all distributions.  Capillary refill less than 2 seconds.  2+ radial and ulnar pulses.      IMAGING / LABS / EMGs    None today      Electronically Signed      IVIS Hope MD      Orthopaedic Hand Surgery      [phone]

## 2025-05-16 NOTE — Clinical Note
May 19, 2025     Damion Ruiz MD  [address] [place] [address] Rd  [address]  [facility] [address]    Patient: Cj Vargas   YOB: 1960   Date of Visit: 5/16/2025       Dear Dr. Damion Ruiz MD:    Thank you for referring Cj Vargas to me for evaluation. Below are my notes for this consultation.  If you have questions, please do not hesitate to call me. I look forward to following your patient along with you.       Sincerely,     Devendra Hope MD      CC: No Recipients  ______________________________________________________________________________________

## 2025-05-22 ENCOUNTER — OFFICE VISIT (OUTPATIENT)
Dept: PAIN MEDICINE | Facility: HOSPITAL | Age: 65
End: 2025-05-22
Payer: COMMERCIAL

## 2025-05-22 DIAGNOSIS — M54.16 LUMBAR RADICULOPATHY: ICD-10-CM

## 2025-05-22 DIAGNOSIS — M48.061 DEGENERATIVE LUMBAR SPINAL STENOSIS: ICD-10-CM

## 2025-05-22 DIAGNOSIS — M46.1 BILATERAL SACROILIITIS: ICD-10-CM

## 2025-05-22 PROCEDURE — 99214 OFFICE O/P EST MOD 30 MIN: CPT | Performed by: ANESTHESIOLOGY

## 2025-05-22 PROCEDURE — 1036F TOBACCO NON-USER: CPT | Performed by: ANESTHESIOLOGY

## 2025-05-22 RX ORDER — TRAMADOL HYDROCHLORIDE 50 MG/1
50 TABLET, FILM COATED ORAL 3 TIMES DAILY PRN
Qty: 21 TABLET | Refills: 0 | Status: SHIPPED | OUTPATIENT
Start: 2025-05-22 | End: 2025-05-29

## 2025-05-22 ASSESSMENT — PAIN SCALES - GENERAL: PAINLEVEL_OUTOF10: 7

## 2025-05-22 NOTE — PROGRESS NOTES
Chief Complaint   Patient presents with    Back Pain    Extremity Pain     Subjective   Patient ID: Cj Vargas is a 64 y.o. male presenting with complaint of bilateral lower back pain with radicular symptoms down the right lower extremity, as well as bilateral buttock pain.     HPI:   Cj Vargas is a 64 y.o. male presenting with complaint of bilateral lower back pain with radicular symptoms down the right lower extremity, as well as bilateral hip pain.  Patient is status post lumbar spine decompression and fusion at multiple levels.  Patient is status post multiple bilateral L2 lumbar transforaminal epidural steroid injections in the past.  Patient says his pain recently is more localized in the lower back, compared to the more higher level pain he had when he underwent the L2 transforaminal epidural steroid injections.  Patient describes his pain as being exasperated when he is going from a seated to standing position.  Patient says he has pain when attempting to get in and out of a car.  Patient describes his pain as about 5 out of 10 intensity on average and sharp in nature.  Patient says that the his pain has continuously worsened over the past year and a half.  Patient reportedly has used acetaminophen and ibuprofen for pain control.  Patient has reportedly previously used lidocaine patches for pain relief.  Patient is reportedly scheduled to begin physical therapy soon.    The patient did see his spine surgeon, Dr. Morris who is concerned about sacroiliitis and requesting possible sacroiliac joint injections if appropriate.    Review of Systems   13-point ROS done and negative except for HPI.     Current Outpatient Medications   Medication Instructions    buPROPion (WELLBUTRIN) 100 mg, oral, 3 times daily    cholecalciferol, vitamin D3, 250 mcg (10,000 unit) tablet Take 1 tablet (250 mcg) by mouth every other day.    Eliquis 5 mg, oral, 2 times daily    enoxaparin (LOVENOX) 120 mg, subcutaneous, Every  12 hours    gabapentin (NEURONTIN) 600 mg, oral, 3 times daily    irbesartan-hydrochlorothiazide (Avalide) 150-12.5 mg tablet 1 tablet, oral, Daily    methocarbamol (ROBAXIN) 1,000 mg, oral, Every 8 hours    methocarbamol (ROBAXIN) 1,000 mg, oral, Every 8 hours PRN    multivitamin tablet 1 tablet, Daily    omeprazole (PriLOSEC) 40 mg DR capsule TAKE 1 CAPSULE DAILY. DO   NOT CRUSH OR CHEW.    polyethylene glycol (Glycolax, Miralax) 17 gram/dose powder Take 17 g by mouth 2 times a day as needed (constipation) for up to 10 days.    QUEtiapine (SEROQUEL) 50 mg, oral, Nightly    rosuvastatin (CRESTOR) 10 mg, oral, Daily    sildenafil (VIAGRA) 100 mg, oral, Daily PRN    tiZANidine (ZANAFLEX) 4 mg, oral, 3 times daily    tiZANidine (ZANAFLEX) 4 mg, oral, Every 8 hours PRN       Medical History[1]     Surgical History[2]     Family History[3]     RX Allergies[4]     Objective     There were no vitals filed for this visit.     Physical Exam  General: NAD, well groomed, well nourished  Eyes: Non-icteric sclera, EOMI  Ears, Nose, Mouth, and Throat: External ears and nose appear to be without deformity or rash. No lesions or masses noted. Hearing is grossly intact.   Neck: Trachea midline  Respiratory: Nonlabored breathing   Cardiovascular: No noted significant peripheral edema   Skin: No rashes or open lesions/ulcers identified on skin.      Palpation: Tenderness to palpation over lumbar paraspinous muscles.   Straight leg raise: Positive on the right   KATE Maneuver does reproduce pain bilaterally    Positive Gaenslen's, Yeoman's, and thrust exams noted bilaterally    Hip: Pain over right greater trochanter. and Pain over left greater trochanter.    Neurologic:   Cranial nerves grossly intact.   Strength: Diminished strength noted with right plantarflexion  Sensation: Diminished sensation to light touch noted in bilateral ankles  DTRs:normal and symmetric throughout  Daugherty: absent    Psychiatric: Alert, orientation to  person, place, and time. Cooperative.    Imaging personally reviewed and independently interpreted  MRI was personally reviewed in PACS which shows adjacent level disease at L2-3 where there is some central stenosis and he also has some foraminal narrowing at the L4 level bilaterally.    Assessment/Plan   Cj Vargas is a 64 y.o. male presenting with complaint of bilateral lower back pain with radicular symptoms down the right lower extremity, as well as bilateral buttock pain.    Discussed with the patient that I do think he has both sacroiliitis and a component of spinal stenosis with claudication and radicular symptoms.    Right now his worst pain are the pains that are reproduced with SI joint maneuvers.  Will start with SI joint injections.    Plan:  -Patient to be scheduled for bilateral sacroiliac joint injections under fluoroscopy.  Procedure, risk, benefits, alternatives reviewed.    -We will consider right sided lumbar transforaminal epidural steroid injections in the future if satisfactory relief of pain is not achieved after his SI injections.  Would likely consider right L2 and L4 transforaminal.  Procedure, risk, benefits and alternatives.    -Encouraged to keep up with physical therapy and core strengthening.    Medical Necessity  The patient has failed conservative treatment including different classes of medications and physical therapy.  Patient continues to rate their pain as moderate to severe, affecting quality of sleep, quality of life and  significantly impairing daily activities (ADLs)   We discussed  the risks, benefits and alternatives of the procedure including but not limited to: Lack of efficacy , Transiently worsening pain , Bleeding, Infection , and Nerve Damage and patient is amicable to the plan      Follow up: As needed     The patient was invited to contact us back anytime with any questions or concerns and follow-up with us in the office as needed.     Diagnoses and all orders  for this visit:  Degenerative lumbar spinal stenosis  -     Referral to Pain Medicine      This note was generated with the aid of dictation software, there may be typos despite my attempts at proofreading.           [1]   Past Medical History:  Diagnosis Date    Anemia     Arthritis 2010    BPH (benign prostatic hyperplasia)     Cataract 2020    CPAP (continuous positive airway pressure) dependence 2000    Depression     GERD (gastroesophageal reflux disease)     Hyperlipidemia     Hypertension     Low back pain     Neuropathy     Obese     RADHA on CPAP     Panic disorder     Pulmonary embolism on long-term anticoagulation therapy (Multi) 2021    Eliquis    Recurrent deep vein thrombosis (DVT) (Multi)     Most recently 10/2023    Spinal stenosis 2015   [2]   Past Surgical History:  Procedure Laterality Date    ANKLE Right 12/2021    arthroscopy    ANKLE Right 2023    athroscopy    APPENDECTOMY  1970    COLONOSCOPY      GASTRIC BYPASS  2017    sleeve gastrectomy    KNEE ARTHROSCOPY W/ DEBRIDEMENT Right 2018    LUMBAR FUSION      l3-4    TONSILLECTOMY  1974    TOTAL KNEE ARTHROPLASTY Right 11/28/2023    TOTAL KNEE ARTHROPLASTY Left 2013   [3]   Family History  Problem Relation Name Age of Onset    Emphysema Mother Mom     COPD Mother Mom     Lupus Mother Mom     Coronary artery disease Father Dad     Hyperlipidemia Father Dad     Hypertension Father Dad     Stroke Father Dad     Alcohol abuse Father Dad     Hearing loss Father Dad     Vision loss Father Dad     Atrial fibrillation Brother     [4] No Known Allergies

## 2025-05-30 ENCOUNTER — LAB (OUTPATIENT)
Dept: LAB | Facility: HOSPITAL | Age: 65
End: 2025-05-30
Payer: COMMERCIAL

## 2025-05-30 ENCOUNTER — OFFICE VISIT (OUTPATIENT)
Dept: ORTHOPEDIC SURGERY | Facility: CLINIC | Age: 65
End: 2025-05-30
Payer: COMMERCIAL

## 2025-05-30 ENCOUNTER — HOSPITAL ENCOUNTER (OUTPATIENT)
Dept: RADIOLOGY | Facility: CLINIC | Age: 65
Discharge: HOME | End: 2025-05-30
Payer: COMMERCIAL

## 2025-05-30 DIAGNOSIS — M19.071 ARTHRITIS OF BOTH ANKLES: ICD-10-CM

## 2025-05-30 DIAGNOSIS — R97.20 ELEVATED PSA: ICD-10-CM

## 2025-05-30 DIAGNOSIS — M25.879 ANKLE IMPINGEMENT SYNDROME, UNSPECIFIED LATERALITY: ICD-10-CM

## 2025-05-30 DIAGNOSIS — M19.072 ARTHRITIS OF BOTH ANKLES: ICD-10-CM

## 2025-05-30 DIAGNOSIS — R97.20 ELEVATED PROSTATE SPECIFIC ANTIGEN (PSA): Primary | ICD-10-CM

## 2025-05-30 DIAGNOSIS — M62.469 GASTROCNEMIUS EQUINUS, UNSPECIFIED LATERALITY: Primary | ICD-10-CM

## 2025-05-30 PROCEDURE — 84154 ASSAY OF PSA FREE: CPT

## 2025-05-30 PROCEDURE — 99213 OFFICE O/P EST LOW 20 MIN: CPT | Performed by: ORTHOPAEDIC SURGERY

## 2025-05-30 PROCEDURE — 84153 ASSAY OF PSA TOTAL: CPT

## 2025-05-30 PROCEDURE — 1036F TOBACCO NON-USER: CPT | Performed by: ORTHOPAEDIC SURGERY

## 2025-05-30 PROCEDURE — 73610 X-RAY EXAM OF ANKLE: CPT | Mod: 50

## 2025-05-30 ASSESSMENT — PAIN - FUNCTIONAL ASSESSMENT: PAIN_FUNCTIONAL_ASSESSMENT: 0-10

## 2025-05-30 ASSESSMENT — PAIN SCALES - GENERAL: PAINLEVEL_OUTOF10: 4

## 2025-05-30 NOTE — PROGRESS NOTES
"Returns for both ankles.  Some short-term relief of some pain in the ankle but no improvement of his \"stiffness\".    Exam: Tight gastroc.  No crepitus with ankle motion.  Supple subtalar joint.    Assessment: Bilateral ankle impingement with tight gastroc.    Plan: Recommend course of therapy to work on his flexibility.  Likely he is \"stiffness\" is his tight gastroc and not necessarily tissue impinging in the ankle.  Either way, pain is not bad enough to consider the surgical options of ankle arthroscopy and/or gastroc lengthening.  "

## 2025-06-02 LAB
PSA FREE MFR SERPL: 14 %
PSA FREE SERPL-MCNC: 0.8 NG/ML
PSA SERPL IA-MCNC: 5.8 NG/ML (ref 0–4)

## 2025-06-03 ENCOUNTER — PROCEDURE VISIT (OUTPATIENT)
Dept: NEUROLOGY | Facility: HOSPITAL | Age: 65
End: 2025-06-03
Payer: COMMERCIAL

## 2025-06-03 DIAGNOSIS — G56.01 CARPAL TUNNEL SYNDROME ON RIGHT: ICD-10-CM

## 2025-06-03 DIAGNOSIS — G56.31 RADIAL NERVE COMPRESSION, RIGHT: ICD-10-CM

## 2025-06-03 DIAGNOSIS — G60.3 IDIOPATHIC PROGRESSIVE POLYNEUROPATHY: Primary | ICD-10-CM

## 2025-06-03 PROCEDURE — 76536 US EXAM OF HEAD AND NECK: CPT | Performed by: PSYCHIATRY & NEUROLOGY

## 2025-06-03 PROCEDURE — 76883 US NRV&ACC STRUX 1XTR COMPRE: CPT | Performed by: PSYCHIATRY & NEUROLOGY

## 2025-06-03 NOTE — PROGRESS NOTES
NEUROMUSCULAR ULTRASOUND OF THE [RIGHT UPPER EXTREMITY]    INDICATION:   Clinical Information: patient has numbness in bilateral feet ascending passing the knees for about 7-8 years. In the past 1 year, he developed numbness in bilateral hand symmetrically and now reaching bilateral elbows in a glove distribution. The numbness waxes and wanes (with a constant low-grade baseline numbness), worse after using his hands repetitively. The numbness does not wake him up at night. His prior EMG showed evidence of neuropathy and right carpal tunnel syndrome. He has personal and family history of high-arched feet and neuropathy, he was never athletic since childhood. This test is ordered to evaluate right median neuropathy and right radial neuropathy.     Neuromuscular ultrasound to be performed:  (a) to evaluate the echotexture and size of the right median nerve in the limb with attention to the carpal tunnel; to evaluate the echotexture and size of the right radial nerve at the spiral groove; confirm that the right radial nerve is normal distally and proximally in the arm and forearm;  (b) to assess for any identifiable structural source of right median nerve compression;   (c) to assess adjacent structures around the right median nerve for abnormalities;  (d) to assess the right wrist joint for abnormalities.  (e) to assess for any identifiable structural source of right radial nerve compression throughout its course in the arm and forearm.      HEIGHT: 6 ft 1 in  WEIGHT: 290 lbs.  HANDEDNESS: Right    COMPARISON:   None.    TECHNIQUE:  The right median nerve and accompanying structures were studied throughout their entire anatomic course in the limb from the wrist to the axilla, including real-time cine imaging. The examination was performed using a [GE P9] ultrasound machine with a [15-6 MHz matrix linear transducer]. Both axial and longitudinal views were obtained. The patient was examined [supine with the arm extended  and supinated]. Cross sectional area (CSA) was measured within the epineurium, using the trace method. At locations where repeat measurements were taken, the mean value is reported below. Transverse and longitudinal images were obtained.     The right radial nerve and accompanying structures were studied throughout their entire anatomic course in the limb from the spiral groove to the elbow where the nerve bifurcated into its superficial and deep branches. The superficial and deep branches were both studied throughout their anatomic course in the forearm. Real-time cine imaging was obtained. The study was was performed using a [[employer]] ultrasound machine with a [15-6 MHz matrix linear transducer.] The patient was placed supine with the arm slightly abducted. The right radial nerve was identified lateral to the antecubital fossa between the brachialis and brachioradialis muscles. It was then followed proximally as it approached the humerus. The nerve was then followed proximally toward the posterior axillary fold. The radial nerve was examined via the transverse view and measured at the site of maximal enlargement. At the side of maximal enlargement, the nerve was also visualized with a longitudinal view. The radial nerve was then followed distally to where it bifurcated in the superficial and deep branches. The deep branch was followed into the supinator muscle; the superficial branch was followed distally under the brachioradialis muscle to where it became subcutaneous in the distal forearm. Cross sectional area (CSA) was measured within the epineurium, using the trace method at all locations. At locations where repeat measurements were taken, the mean value is reported below.    The brachial plexus was examined, identifying the three trunks of the brachial plexus as they passed between the anterior and middle scalene muscles. The trunks were followed to the supraclavicular brachial plexus as well.      FINDINGS:  Right median nerve:   At the right wrist at the distal wrist crease (proximal carpal tunnel), the median nerve CSA was 17.4 mm2 (NL < 10 mm2; borderline 10-13 mm2; ABN > 13 mm2).    The flattening ratio of the right median nerve (ratio of width / height of median nerve in the short- axis view) was 4.6 (NL < 3).    The echogenicity of the right median nerve at the wrist was mildly reduced. The mobility of the right median nerve with flexion and extension of the fingers was mildly reduced. Color Doppler of the right median nerve showed normal median nerve vascularity. No abnormal tenosynovitis of the right flexor tendons was noted.    Using a longitudinal scan of the right median nerve at the wrist, a notch sign was not seen under the flexor retinaculum.    A right persistent median artery was not present. A right bifid median nerve was not present. No other abnormal mass or ganglia was appreciated in the right carpal tunnel. With extension of the fingers, there was no abnormal intrusion of the right flexor digitorum sublimis. With flexion of the fingers, there was no abnormal intrusion of the right lumbrical muscles.    In the mid-forearm, approximately 12 cm proximal to the distal wrist crease, the right median nerve was seen between the flexor digitorum sublimis and flexor digitorum profundus muscles. At the mid-forearm, the right median nerve CSA was 10.2 mm2. The ratio of the right median CSAs between the wrist and forearm (WFR) was 1.7 (NL < 1.5; borderline: 1.5 - 2.0). The echogenicity of the right median nerve in the forearm was normal.    The right median nerve was then followed proximal into the forearm and then to the axilla. The median nerve fascicles were not well demarcated at the antecubital fossa, mid-arm and axilla. The size of the median nerve was relatively big throughout the course, but the nerve appears qualitatively normal.      Scanning the muscles, the echogenicity was normal of  the flexor digitorum sublimis, flexor digitorum profundus, flexor pollicis longus, flexor carpi radialis, and pronator teres. The echogenicity of the abductor pollicis brevis was normal.     At the right wrist joint, the radial carpal joint was normal. No abnormal effusion was seen. The flexor carpi radialis tendon was normal. Both the radial and ulnar arteries were well seen and were normal.    On the dorsal right wrist, the Kimberlyn´s tubercle and the six dorsal compartments with their tendons were well visualized. No tenosynovitis was present. No ganglion cyst was present.    Right radial nerve:   Near the right antecubital fossa between the brachioradialis and brachialis muscles, radial nerve was identified. The CSA was 20.9 mm2 (NL < 14.1 mm2). The echogenicity was normal.     The radial nerve was then followed proximally toward the humerus. The CSA below the spinal groove was 12.9 mm2 (NL < 14.1 mm2). The echogenicity was normal. Adjacent to the humerus, the CSA was 11.3 mm2 (NL < 13.3 mm2). The echogenicity was normal.     On the right, just proximal to the spiral groove, the CSA of the radial nerve was 14.5 mm2 (NL < 13.3 mm2). The echogenicity was normal.     The echogenicity at the site of maximal enlargement was normal. Color Doppler of the right radial nerve near the humerus showed normal nerve vascularity. No abnormal mass was appreciated affecting the right radial nerve near the humerus.     The ratio of the CSAs between the point of maximal enlargement near the right humerus and antecubital fossa was 0.54 (NL < 1.5).     At the antecubital fossa, the radial nerve was then followed to where it bifurcated to the deep and superficial branches. The deep branch is qualitatively normal. The deep branch was then followed into the supinator between the superficial and deep heads of the muscle. No abnormal ganglion or entrapping structure was present. The deep branch (now termed the posterior interosseous nerve)  was visualized in the extensor compartment and was normal. The superficial and deep extensor muscles were normal.     Attention was then turned to the right superficial radial sensory at its origin just distal to the antecubital fossa. The nerve was followed distally under the brachioradialis to where it became subcutaneous in the distal forearm, and then followed approximately 7-8 cm above the right radial styloid, the superficial radial sensory nerve was identified near the tendons of the extensor carpi radialis and brachioradialis. The CSA was 8.3 mm2 (NL < 4 mm2). The echogenicity was normal. No abnormal mass, neuroma or ganglia was appreciated. A normal cephalic vein and radial artery were visualized.    Scanning the muscles, the echogenicity was normal of the triceps, brachioradialis, extensor carpi radialis, extensor digitorum communis, and the extensor carpi ulnaris.     Right brachial plexus:   At the right brachial plexus, the three trunks were seen between the anterior and middle scalene muscles but without a clear border. The trunks appeared qualitatively normal and no abnormal enlargement. The internal carotid artery, jugular vein and thyroid were well seen. The subclavian artery and vein were also seen and were normal.      IMPRESSION:  This is an abnormal neuromuscular ultrasound examination of the right upper extremity.     There was ultrasound evidence of a mild median neuropathy at the right wrist. In addition, the median nerve was followed through its anatomic course in the limb from wrist to axilla and was qualitatively normal above the wrist, though the median nerve cross sectional area was generally enlarged compared to normal value.     There was no ultrasound evidence of right radial neuropathy in the arm adjacent to the spiral groove. The main branches of the radial nerve, the superficial sensory and posterior interosseous nerves, were also qualitatively normal, though the entire radial  nerve, including the superficial branch, was generally enlarged compared to normal value. No entrapment of the posterior interosseous nerve was present at its entrance into the supinator muscle.     Due to the relatively diffusely enlarged nerves, the right brachial plexus was evaluated and was qualitatively normal.     Of note, given the patient's personal and family history of high-arched feet and neuropathy, and diffusely enlarged nerves on ultrasound, a hereditary cause of neuropathy is possible.     The other visualized osseous, ligamentous, joint and tendinous structures appeared normal.           Performed by: Aldair Otero MD  Authorized by: Aldair Otero MD

## 2025-06-15 NOTE — H&P
Pain Management H&P    History Of Present Illness  Cj Vargas is a 64 y.o. male presents for procedure state below. Endorses no changes in past medical history or medical health since last seen in clinic.      Past Medical History  He has a past medical history of Anemia, Arthritis (2010), BPH (benign prostatic hyperplasia), Cataract (2020), Chronic pain disorder, CPAP (continuous positive airway pressure) dependence (2000), Depression, Extremity pain, GERD (gastroesophageal reflux disease), Hyperlipidemia, Hypertension, Low back pain, Neuropathy, Obese, RADHA on CPAP, Osteoarthritis, Panic disorder, Peripheral neuropathy, Pulmonary arterial hypertension (Multi) (2000), Pulmonary embolism on long-term anticoagulation therapy (Multi) (2021), Recurrent deep vein thrombosis (DVT) (Multi), and Spinal stenosis (2015).    Surgical History  He has a past surgical history that includes Appendectomy (1970); Gastric bypass (2017); Tonsillectomy (1974); XR ankle (Right, 12/2021); XR ankle (Right, 2023); Colonoscopy; Lumbar fusion; Knee arthroscopy w/ debridement (Right, 2018); Total knee arthroplasty (Right, 11/28/2023); Total knee arthroplasty (Left, 2013); LASIK (2005); Radial keratotomy (1995); Spinal fusion (2021); Joint replacement (2014, 2023); orthopedic surgery (3927-7585); and Trigger point injection ([id number]).     Social History  He reports that he has never smoked. He has never been exposed to tobacco smoke. He has never used smokeless tobacco. He reports current alcohol use of about 3.0 standard drinks of alcohol per week. He reports that he does not currently use drugs after having used the following drugs: Marijuana.    Family History  Family History[1]     Allergies  Patient has no known allergies.    Review of Symptoms:   Constitutional: Negative for chills, diaphoresis or fever  HENT: Negative for neck swelling  Eyes:.  Negative for eye pain  Respiratory:.  Negative for cough, shortness of breath or  wheezing    Cardiovascular:.  Negative for chest pain or palpitations  Gastrointestinal:.  Negative for abdominal pain, nausea and vomiting  Genitourinary:.  Negative for urgency  Musculoskeletal:  Positive for back pain. Positive for joint pain. Denies falls within the past 3 months.  Skin: Negative for wounds or itching   Neurological: Negative for dizziness, seizures, loss of consciousness and weakness  Endo/Heme/Allergies: Does not bruise/bleed easily  Psychiatric/Behavioral: Negative for depression. The patient does not appear anxious.      Pre-sedation Evaluation  ASA class 2  Mallampati score 2     PHYSICAL EXAM  Vitals signs reviewed  Constitutional:       General: Not in acute distress     Appearance: Normal appearance. Not ill-appearing.  HENT:     Head: Normocephalic and atraumatic  Eyes:     Conjunctiva/sclera: Conjunctivae normal  Cardiovascular:     Rate and Rhythm: Normal rate and regular rhythm  Pulmonary:     Effort: No respiratory distress  Abdominal:     Palpations: Abdomen is soft  Musculoskeletal: BEJARANO  Skin:     General: Skin is warm and dry  Neurological:     General: No focal deficit present  Psychiatric:         Mood and Affect: Mood normal         Behavior: Behavior normal     Last Recorded Vitals  There were no vitals taken for this visit.    Relevant Results  Current Outpatient Medications   Medication Instructions    buPROPion (WELLBUTRIN) 100 mg, oral, 3 times daily    cholecalciferol, vitamin D3, 250 mcg (10,000 unit) tablet Take 1 tablet (250 mcg) by mouth every other day.    Eliquis 5 mg, oral, 2 times daily    enoxaparin (LOVENOX) 120 mg, subcutaneous, Every 12 hours    gabapentin (NEURONTIN) 600 mg, oral, 3 times daily    irbesartan-hydrochlorothiazide (Avalide) 150-12.5 mg tablet 1 tablet, oral, Daily    methocarbamol (ROBAXIN) 1,000 mg, oral, Every 8 hours    methocarbamol (ROBAXIN) 1,000 mg, oral, Every 8 hours PRN    multivitamin tablet 1 tablet, Daily    omeprazole (PriLOSEC)  40 mg DR capsule TAKE 1 CAPSULE DAILY. DO   NOT CRUSH OR CHEW.    polyethylene glycol (Glycolax, Miralax) 17 gram/dose powder Take 17 g by mouth 2 times a day as needed (constipation) for up to 10 days.    QUEtiapine (SEROQUEL) 50 mg, oral, Nightly    rosuvastatin (CRESTOR) 10 mg, oral, Daily    sildenafil (VIAGRA) 100 mg, oral, Daily PRN    tiZANidine (ZANAFLEX) 4 mg, oral, 3 times daily    tiZANidine (ZANAFLEX) 4 mg, oral, Every 8 hours PRN         MR lumbar spine wo IV contrast 07/11/2023    Narrative  Interpreted By:  SUSANNE IBARRA MD  MRN: [id number]  Patient Name: MASHA FREITAS    STUDY:  MRI L-SPINE WO;  7/11/2023 10:30 am    INDICATION:  Lumbar stenosis / leg pain/weakness  M48.061: Lumbar spinal stenosis.    COMPARISON:  None.    ACCESSION NUMBER(S):  [id number]    ORDERING CLINICIAN:  ELIZABETH KELLER    TECHNIQUE:  Sagittal T1, T2, STIR, axial T1 and T2 weighted images of the lumbar  spine were acquired.    FINDINGS:  For counting purposes the last lumbarized vertebral body is labeled  L5.    There are postsurgical changes of posterolateral fusion,  decompression and discectomy at L3-L4.    Alignment, vertebral body heights and marrow signal pattern are  within normal limits. There is desiccated disc signal throughout the  lumbar spine without significant loss of disc height. There is an  intervertebral disc prosthesis at L3-L4. The conus terminates at  L1-L2 and is unremarkable.    Evaluation of the paraspinal soft tissues is unremarkable.    Evaluation by level:    T12-L1: Mild disc bulge and facet arthrosis. No significant spinal  canal or neural foraminal stenosis.    L1-L2: Mild disc bulge and facet arthrosis. No significant spinal  canal or neural foraminal stenosis.    L2-L3: Disc bulge, facet arthrosis and ligamentum flavum thickening.  Mild-to-moderate spinal canal stenosis. Mild left and no significant  right neural foraminal stenosis.    L3-L4: No significant spinal canal stenosis. Within the  limitation of  metallic susceptibility artifact no significant neural foraminal  stenosis.    L4-L5: Disc bulge, facet arthrosis and ligamentum flavum thickening.  No significant spinal canal stenosis. Mild-to-moderate right and mild  left neural foraminal stenosis.    L5-S1: Disc bulge and facet arthrosis. No significant spinal canal  stenosis. Mild-to-moderate left and mild right neural foraminal  stenosis.    Impression  Postsurgical changes of posterolateral fusion, decompression and  discectomy at L3-L4. There is no significant spinal canal or neural  foraminal stenosis at this level.    Degenerative changes without significant spinal canal stenosis  throughout the lumbar spine. Mild-to-moderate neural foraminal  narrowing at L4-L5 and L5-S1.     No image results found.       No diagnosis found.     ASSESSMENT/PLAN  Cj Vargas is a 64 y.o. male here for bilateral sacroiliac joint injection under fluoroscopy    Patient denies any recent antibiotic use or infections, eliquis held 3 days ago, and denies contrast or local anesthetic allergies     Risks, benefits, alternatives discussed. All questions answered to the best of my ability. Patient agrees to proceed.      Our plan is as follows:  - Proceed with aforementioned procedure          Nato Zaragoza MD   Pain fellow          [1]   Family History  Problem Relation Name Age of Onset    Emphysema Mother Mom     COPD Mother Mom     Lupus Mother Mom     Coronary artery disease Father Dad     Hyperlipidemia Father Dad     Hypertension Father Dad     Stroke Father Dad     Alcohol abuse Father Dad     Hearing loss Father Dad     Vision loss Father Dad     Blood clot Father Dad     Hernia Father Dad     Macular degeneration Father Dad     Atrial fibrillation Brother Donavon     Astigmatism Brother Donavon

## 2025-06-16 ENCOUNTER — HOSPITAL ENCOUNTER (OUTPATIENT)
Facility: HOSPITAL | Age: 65
Discharge: HOME | End: 2025-06-16
Payer: COMMERCIAL

## 2025-06-16 VITALS
SYSTOLIC BLOOD PRESSURE: 123 MMHG | DIASTOLIC BLOOD PRESSURE: 81 MMHG | RESPIRATION RATE: 18 BRPM | HEART RATE: 84 BPM | OXYGEN SATURATION: 94 % | TEMPERATURE: 99 F

## 2025-06-16 DIAGNOSIS — M46.1 BILATERAL SACROILIITIS: ICD-10-CM

## 2025-06-16 DIAGNOSIS — M48.061 DEGENERATIVE LUMBAR SPINAL STENOSIS: ICD-10-CM

## 2025-06-16 PROCEDURE — 2550000001 HC RX 255 CONTRASTS: Performed by: ANESTHESIOLOGY

## 2025-06-16 PROCEDURE — 2500000004 HC RX 250 GENERAL PHARMACY W/ HCPCS (ALT 636 FOR OP/ED): Performed by: ANESTHESIOLOGY

## 2025-06-16 PROCEDURE — 27096 INJECT SACROILIAC JOINT: CPT | Performed by: ANESTHESIOLOGY

## 2025-06-16 PROCEDURE — 27096 INJECT SACROILIAC JOINT: CPT | Mod: 50 | Performed by: ANESTHESIOLOGY

## 2025-06-16 RX ORDER — BUPIVACAINE HYDROCHLORIDE 2.5 MG/ML
INJECTION, SOLUTION EPIDURAL; INFILTRATION; INTRACAUDAL; PERINEURAL
Status: COMPLETED | OUTPATIENT
Start: 2025-06-16 | End: 2025-06-16

## 2025-06-16 RX ORDER — MIDAZOLAM HYDROCHLORIDE 1 MG/ML
INJECTION, SOLUTION INTRAMUSCULAR; INTRAVENOUS
Status: COMPLETED | OUTPATIENT
Start: 2025-06-16 | End: 2025-06-16

## 2025-06-16 RX ORDER — METHYLPREDNISOLONE ACETATE 40 MG/ML
INJECTION, SUSPENSION INTRA-ARTICULAR; INTRALESIONAL; INTRAMUSCULAR; SOFT TISSUE
Status: COMPLETED | OUTPATIENT
Start: 2025-06-16 | End: 2025-06-16

## 2025-06-16 RX ORDER — LIDOCAINE HYDROCHLORIDE 5 MG/ML
INJECTION, SOLUTION INFILTRATION; INTRAVENOUS
Status: COMPLETED | OUTPATIENT
Start: 2025-06-16 | End: 2025-06-16

## 2025-06-16 RX ADMIN — MIDAZOLAM 2 MG: 1 INJECTION INTRAMUSCULAR; INTRAVENOUS at 10:59

## 2025-06-16 RX ADMIN — IOHEXOL 1.5 ML: 240 INJECTION, SOLUTION INTRATHECAL; INTRAVASCULAR; INTRAVENOUS; ORAL at 11:05

## 2025-06-16 RX ADMIN — BUPIVACAINE HYDROCHLORIDE 2 ML: 2.5 INJECTION, SOLUTION EPIDURAL; INFILTRATION; INTRACAUDAL; PERINEURAL at 11:04

## 2025-06-16 RX ADMIN — METHYLPREDNISOLONE ACETATE 40 MG: 40 INJECTION, SUSPENSION INTRA-ARTICULAR; INTRALESIONAL; INTRAMUSCULAR; SOFT TISSUE at 11:05

## 2025-06-16 RX ADMIN — LIDOCAINE HYDROCHLORIDE 3 ML: 5 INJECTION, SOLUTION INFILTRATION at 11:04

## 2025-06-16 ASSESSMENT — PAIN SCALES - GENERAL
PAINLEVEL_OUTOF10: 0 - NO PAIN
PAINLEVEL_OUTOF10: 5 - MODERATE PAIN
PAINLEVEL_OUTOF10: 5 - MODERATE PAIN
PAINLEVEL_OUTOF10: 2
PAINLEVEL_OUTOF10: 2

## 2025-06-16 ASSESSMENT — PAIN - FUNCTIONAL ASSESSMENT
PAIN_FUNCTIONAL_ASSESSMENT: WONG-BAKER FACES
PAIN_FUNCTIONAL_ASSESSMENT: 0-10
PAIN_FUNCTIONAL_ASSESSMENT: 0-10
PAIN_FUNCTIONAL_ASSESSMENT: WONG-BAKER FACES
PAIN_FUNCTIONAL_ASSESSMENT: 0-10

## 2025-06-16 NOTE — DISCHARGE INSTRUCTIONS
DISCHARGE INSTRUCTIONS FOR INJECTIONS     You underwent bilateral sacroiliac joint injection today    After most injections, it is recommended that you relax and limit your activity for the remainder of the day unless you have been told otherwise by your pain physician.  You should not drive a car, operate machinery, or make important legal decisions unless otherwise directed by your pain physician.  You may resume your normal activity, including exercise, tomorrow.      Keep a written pain diary of how much pain relief you experienced following the injection procedure and the length of time of pain relief you experienced pain relief. Following diagnostic injections like medial branch nerve blocks, sacroiliac joint blocks, stellate ganglion injections and other blocks, it is very important you record the specific amount of pain relief you experienced immediately after the injectionand how long it lasted. Your doctor will ask you for this information at your follow up visit.     For all injections, please keep the injection site dry and inspect the site for a couple of days. You may remove the Band-Aid the day of the injection at any time.     Some discomfort, bruising or slight swelling may occur at the injection site. This is not abnormal if it occurs.  If needed you may:    -Take over the counter medication such as Tylenol or Motrin.   -Apply an ice pack for 30 minutes, 2 to 3 times a day for the first 24 hours.     You may shower today; no soaking baths, hot tubs, whirlpools or swimming pools for two days.      If you are given steroids in your injection, it may take 3-5 days for the steroid medication to take effect. You may notice a worsening of your symptoms for 1-2 days after the injection. This is not abnormal.  You may use acetaminophen, ibuprofen, or prescription medication that your doctor may have prescribed for you if you need to do so.     A few common side effects of steroids include facial flushing,  sweating, restlessness, irritability,difficulty sleeping, increase in blood sugar, and increased blood pressure. If you have diabetes, please monitor your blood sugar at least once a day for at least 5 days. If you have poorly controlled high blood pressure, monitoryour blood pressure for at least 2 days and contact your primary care physician if these numbers are unusually high for you.      If you take aspirin or non-steroidal anti-inflammatory drugs (examples are Motrin, Advil, ibuprofen, Naprosyn, Voltaren, Relafen, etc.) you may restart these this evening, but stop taking it 3 days before your next appointment, unless instructed otherwiseby your physician.      You do not need to discontinue non-aspirin-containing pain medications prior to an injection (examples: Celebrex, tramadol, hydrocodone and acetaminophen).      If you take a blood thinning medication (Coumadin, Lovenox, Fragmin,Ticlid, Plavix, Pradaxa, etc.), please discuss this with your primary care physician/cardiologist and your pain physician. These medications MUST be discontinued before you can have an injection safely, without the risk of uncontrolled bleeding. If these medications are not discontinued for an appropriate period of time, you will not be able to receivean injection. Please adhere to instructions given to you about when to restart your blood thinning medication. If you have any questions please reach out to our team.    If you are taking Coumadin, please have your INR checked the morning of your procedure and bring the result to your appointment unless otherwise instructed. If your INR is over 1.2, your injection may need to be rescheduled to avoid uncontrolled bleeding from the needle placement.     Call [facility]  and ask for Pain Management at [phone] between 8am-4pm Monday - Friday if you are experiencing the following:    If you received an epidural or spinal injection:    -Headache that doesnot go away with medicine, is  worse when sitting or standing up, and is greatly relieved upon lying down.   -Severe pain worse than or different than your baseline pain.   -Chills or fever (101º F or greater).   -Drainage or signs of infection at the injection site     Go directly to the Emergency Department if you are experiencing the following and received an epidural or spinal injection:   -Abrupt weakness or progressive weakness in your legs that starts after you leave the clinic.   -Abrupt severe or worsening numbness in your legs.   -Inability to urinate after the injection or loss of bowel or bladder control without the urge to defecate or urinate.     If you have a clinical question that cannot wait until your next appointment, please call [phone] between 8am-4pm Monday - Friday or send a [email or web address] message. We do our best to return all non-emergency messages within 24 hours, Monday - Friday. A nurse or physician will return your message. You may also try calling and they will do their best to answer your question(s):  - Dr. Lui Funk's nurse ([phone])  - Dr. Marcelo Cohen/Dr. Kang's nurse ([phone])  - Dr. Va Candelaria/Dee's nurse ([phone])     If you need to cancel an appointment, please call the scheduling staff at [phone] during normal business hours or leave a message at least 24 hours in advance.     If you are going to be sedated for your next procedure, you MUST have responsible adult who can legally drive accompany you home. You cannot eat or drink for at least eight hours prior to the planned procedure if you are going to receive sedation. You may take your non-blood thinning medications with a small sip of water.

## 2025-06-20 ENCOUNTER — PATIENT MESSAGE (OUTPATIENT)
Dept: PRIMARY CARE | Facility: CLINIC | Age: 65
End: 2025-06-20
Payer: COMMERCIAL

## 2025-06-20 DIAGNOSIS — F41.8 DEPRESSION WITH ANXIETY: ICD-10-CM

## 2025-06-20 DIAGNOSIS — M51.16 LUMBAR DISC DISEASE WITH RADICULOPATHY: ICD-10-CM

## 2025-06-20 DIAGNOSIS — M48.062 SPINAL STENOSIS, LUMBAR REGION WITH NEUROGENIC CLAUDICATION: ICD-10-CM

## 2025-06-20 RX ORDER — TRAMADOL HYDROCHLORIDE 50 MG/1
50 TABLET, FILM COATED ORAL 3 TIMES DAILY PRN
Qty: 21 TABLET | Refills: 0 | Status: SHIPPED | OUTPATIENT
Start: 2025-06-20 | End: 2025-06-27

## 2025-06-23 DIAGNOSIS — M51.16 LUMBAR DISC DISEASE WITH RADICULOPATHY: ICD-10-CM

## 2025-06-23 DIAGNOSIS — F41.8 DEPRESSION WITH ANXIETY: ICD-10-CM

## 2025-06-23 DIAGNOSIS — E29.1 MALE HYPOGONADISM: ICD-10-CM

## 2025-06-23 DIAGNOSIS — G47.33 OSA ON CPAP: Primary | ICD-10-CM

## 2025-06-23 RX ORDER — QUETIAPINE FUMARATE 50 MG/1
50 TABLET, FILM COATED ORAL NIGHTLY
Qty: 90 TABLET | Refills: 0 | Status: SHIPPED | OUTPATIENT
Start: 2025-06-23 | End: 2025-06-24 | Stop reason: SDUPTHER

## 2025-06-23 RX ORDER — TIZANIDINE 4 MG/1
4 TABLET ORAL EVERY 8 HOURS PRN
Qty: 90 TABLET | Refills: 0 | Status: SHIPPED | OUTPATIENT
Start: 2025-06-23 | End: 2025-07-23

## 2025-06-23 RX ORDER — SILDENAFIL 100 MG/1
100 TABLET, FILM COATED ORAL DAILY PRN
Qty: 12 TABLET | Refills: 0 | Status: SHIPPED | OUTPATIENT
Start: 2025-06-23 | End: 2026-06-23

## 2025-06-24 DIAGNOSIS — F41.8 DEPRESSION WITH ANXIETY: ICD-10-CM

## 2025-06-25 RX ORDER — QUETIAPINE FUMARATE 50 MG/1
50 TABLET, FILM COATED ORAL NIGHTLY
Qty: 90 TABLET | Refills: 0 | Status: SHIPPED | OUTPATIENT
Start: 2025-06-25

## 2025-06-30 ENCOUNTER — HOSPITAL ENCOUNTER (OUTPATIENT)
Facility: HOSPITAL | Age: 65
Discharge: HOME | End: 2025-06-30
Payer: COMMERCIAL

## 2025-06-30 VITALS
TEMPERATURE: 98.6 F | SYSTOLIC BLOOD PRESSURE: 123 MMHG | OXYGEN SATURATION: 96 % | WEIGHT: 295 LBS | DIASTOLIC BLOOD PRESSURE: 72 MMHG | RESPIRATION RATE: 17 BRPM | BODY MASS INDEX: 39.1 KG/M2 | HEIGHT: 73 IN | HEART RATE: 71 BPM

## 2025-06-30 DIAGNOSIS — M54.16 LUMBAR RADICULOPATHY: ICD-10-CM

## 2025-06-30 PROCEDURE — 64483 NJX AA&/STRD TFRM EPI L/S 1: CPT | Mod: 50 | Performed by: ANESTHESIOLOGY

## 2025-06-30 PROCEDURE — 2720000007 HC OR 272 NO HCPCS

## 2025-06-30 PROCEDURE — 64483 NJX AA&/STRD TFRM EPI L/S 1: CPT | Performed by: ANESTHESIOLOGY

## 2025-06-30 PROCEDURE — 2500000004 HC RX 250 GENERAL PHARMACY W/ HCPCS (ALT 636 FOR OP/ED): Performed by: ANESTHESIOLOGY

## 2025-06-30 PROCEDURE — 2550000001 HC RX 255 CONTRASTS: Performed by: ANESTHESIOLOGY

## 2025-06-30 RX ORDER — MIDAZOLAM HYDROCHLORIDE 1 MG/ML
INJECTION, SOLUTION INTRAMUSCULAR; INTRAVENOUS
Status: COMPLETED | OUTPATIENT
Start: 2025-06-30 | End: 2025-06-30

## 2025-06-30 RX ORDER — LIDOCAINE HYDROCHLORIDE 5 MG/ML
INJECTION, SOLUTION INFILTRATION; INTRAVENOUS
Status: COMPLETED | OUTPATIENT
Start: 2025-06-30 | End: 2025-06-30

## 2025-06-30 RX ORDER — DEXAMETHASONE SODIUM PHOSPHATE 10 MG/ML
INJECTION INTRAMUSCULAR; INTRAVENOUS
Status: COMPLETED | OUTPATIENT
Start: 2025-06-30 | End: 2025-06-30

## 2025-06-30 RX ADMIN — LIDOCAINE HYDROCHLORIDE 8 ML: 5 INJECTION, SOLUTION INFILTRATION at 10:42

## 2025-06-30 RX ADMIN — IOHEXOL 2 ML: 240 INJECTION, SOLUTION INTRATHECAL; INTRAVASCULAR; INTRAVENOUS; ORAL at 10:42

## 2025-06-30 RX ADMIN — MIDAZOLAM 2 MG: 1 INJECTION INTRAMUSCULAR; INTRAVENOUS at 10:36

## 2025-06-30 RX ADMIN — DEXAMETHASONE SODIUM PHOSPHATE 10 MG: 10 INJECTION, SOLUTION INTRAMUSCULAR; INTRAVENOUS at 10:42

## 2025-06-30 ASSESSMENT — PAIN SCALES - GENERAL
PAINLEVEL_OUTOF10: 2
PAINLEVEL_OUTOF10: 4
PAINLEVEL_OUTOF10: 2

## 2025-06-30 ASSESSMENT — PAIN - FUNCTIONAL ASSESSMENT
PAIN_FUNCTIONAL_ASSESSMENT: 0-10
PAIN_FUNCTIONAL_ASSESSMENT: 0-10
PAIN_FUNCTIONAL_ASSESSMENT: WONG-BAKER FACES

## 2025-06-30 ASSESSMENT — COLUMBIA-SUICIDE SEVERITY RATING SCALE - C-SSRS
2. HAVE YOU ACTUALLY HAD ANY THOUGHTS OF KILLING YOURSELF?: NO
1. IN THE PAST MONTH, HAVE YOU WISHED YOU WERE DEAD OR WISHED YOU COULD GO TO SLEEP AND NOT WAKE UP?: NO

## 2025-06-30 NOTE — DISCHARGE INSTRUCTIONS
DISCHARGE INSTRUCTIONS FOR INJECTIONS     You underwent Lumbar transforaminal epidural steroid injection today    After most injections, it is recommended that you relax and limit your activity for the remainder of the day unless you have been told otherwise by your pain physician.  You should not drive a car, operate machinery, or make important legal decisions unless otherwise directed by your pain physician.  You may resume your normal activity, including exercise, tomorrow.      Keep a written pain diary of how much pain relief you experienced following the injection procedure and the length of time of pain relief you experienced pain relief. Following diagnostic injections like medial branch nerve blocks, sacroiliac joint blocks, stellate ganglion injections and other blocks, it is very important you record the specific amount of pain relief you experienced immediately after the injectionand how long it lasted. Your doctor will ask you for this information at your follow up visit.     For all injections, please keep the injection site dry and inspect the site for a couple of days. You may remove the Band-Aid the day of the injection at any time.     Some discomfort, bruising or slight swelling may occur at the injection site. This is not abnormal if it occurs.  If needed you may:    -Take over the counter medication such as Tylenol or Motrin.   -Apply an ice pack for 30 minutes, 2 to 3 times a day for the first 24 hours.     You may shower today; no soaking baths, hot tubs, whirlpools or swimming pools for two days.      If you are given steroids in your injection, it may take 3-5 days for the steroid medication to take effect. You may notice a worsening of your symptoms for 1-2 days after the injection. This is not abnormal.  You may use acetaminophen, ibuprofen, or prescription medication that your doctor may have prescribed for you if you need to do so.     A few common side effects of steroids include  facial flushing, sweating, restlessness, irritability,difficulty sleeping, increase in blood sugar, and increased blood pressure. If you have diabetes, please monitor your blood sugar at least once a day for at least 5 days. If you have poorly controlled high blood pressure, monitoryour blood pressure for at least 2 days and contact your primary care physician if these numbers are unusually high for you.      If you take aspirin or non-steroidal anti-inflammatory drugs (examples are Motrin, Advil, ibuprofen, Naprosyn, Voltaren, Relafen, etc.) you may restart these this evening, but stop taking it 3 days before your next appointment, unless instructed otherwiseby your physician.      You do not need to discontinue non-aspirin-containing pain medications prior to an injection (examples: Celebrex, tramadol, hydrocodone and acetaminophen).      If you take a blood thinning medication (Coumadin, Lovenox, Fragmin,Ticlid, Plavix, Pradaxa, etc.), please discuss this with your primary care physician/cardiologist and your pain physician. These medications MUST be discontinued before you can have an injection safely, without the risk of uncontrolled bleeding. If these medications are not discontinued for an appropriate period of time, you will not be able to receivean injection. Please adhere to instructions given to you about when to restart your blood thinning medication. If you have any questions please reach out to our team.    If you are taking Coumadin, please have your INR checked the morning of your procedure and bring the result to your appointment unless otherwise instructed. If your INR is over 1.2, your injection may need to be rescheduled to avoid uncontrolled bleeding from the needle placement.     Call [facility]  and ask for Pain Management at [phone] between 8am-4pm Monday - Friday if you are experiencing the following:    If you received an epidural or spinal injection:    -Headache that doesnot go away  with medicine, is worse when sitting or standing up, and is greatly relieved upon lying down.   -Severe pain worse than or different than your baseline pain.   -Chills or fever (101º F or greater).   -Drainage or signs of infection at the injection site     Go directly to the Emergency Department if you are experiencing the following and received an epidural or spinal injection:   -Abrupt weakness or progressive weakness in your legs that starts after you leave the clinic.   -Abrupt severe or worsening numbness in your legs.   -Inability to urinate after the injection or loss of bowel or bladder control without the urge to defecate or urinate.     If you have a clinical question that cannot wait until your next appointment, please call [phone] between 8am-4pm Monday - Friday or send a [employer] message. We do our best to return all non-emergency messages within 24 hours, Monday - Friday. A nurse or physician will return your message. You may also try calling and they will do their best to answer your question(s):  - Dr. Lui Funk's nurse ([phone])  - Dr. Marcelo Cohen/Dr. Kang's nurse ([phone])  - Dr. Va Candelaria/Dee's nurse ([phone])     If you need to cancel an appointment, please call the scheduling staff at [phone] during normal business hours or leave a message at least 24 hours in advance.     If you are going to be sedated for your next procedure, you MUST have responsible adult who can legally drive accompany you home. You cannot eat or drink for at least eight hours prior to the planned procedure if you are going to receive sedation. You may take your non-blood thinning medications with a small sip of water.

## 2025-06-30 NOTE — H&P
"HISTORY AND PHYSICAL UPDATE FOR PROCEDURE    History Of Present Illness  Cj Vargas is a 64 y.o. male presenting with back and right leg pain.  Here for Lumbar transforaminal epidural steroid injection(s) on the right possible bilateral, L5 based on current symptomatology.    This note is intended to be an update to the H&P from the last office consult note. Please refer to the last pain management consult note for full H&P.    he denies any recent antibiotic use or infections, he held Eliquis for >72 hrs, and he denies contrast or local anesthetic allergies     Pre-sedation evaluation:  ASA Classification: ASA III    Mallampati score: Class II    Past Medical History  Medical History[1]    Surgical History  Surgical History[2]     Social History  He reports that he has never smoked. He has never been exposed to tobacco smoke. He has never used smokeless tobacco. He reports current alcohol use of about 3.0 standard drinks of alcohol per week. He reports that he does not currently use drugs after having used the following drugs: Marijuana.    Family History  Family History[3]     Allergies  Patient has no known allergies.    Review of Systems   12 point ROS done and negative except for the above.   Physical Exam     General: NAD, well groomed, well nourished  Eyes: Non-icteric sclera, EOMI  Ears, Nose, Mouth, and Throat: External ears and nose appear to be without deformity or rash. No lesions or masses noted. Hearing is grossly intact.   Neck: Trachea midline  Respiratory: Nonlabored breathing   Cardiovascular: No peripheral edema   Skin: No rashes or open lesions/ulcers identified on skin.    Last Recorded Vitals  Blood pressure (!) 161/91, pulse 73, temperature 37.1 °C (98.8 °F), resp. rate 17, height 1.854 m (6' 1\"), weight 134 kg (295 lb), SpO2 97%.    Relevant Results  Current Outpatient Medications   Medication Instructions    buPROPion (WELLBUTRIN) 100 mg, oral, 3 times daily    cholecalciferol, vitamin " "D3, 250 mcg (10,000 unit) tablet Take 1 tablet (250 mcg) by mouth every other day.    Eliquis 5 mg, oral, 2 times daily    enoxaparin (LOVENOX) 120 mg, subcutaneous, Every 12 hours    gabapentin (NEURONTIN) 600 mg, oral, 3 times daily    irbesartan-hydrochlorothiazide (Avalide) 150-12.5 mg tablet 1 tablet, oral, Daily    methocarbamol (ROBAXIN) 1,000 mg, oral, Every 8 hours    methocarbamol (ROBAXIN) 1,000 mg, oral, Every 8 hours PRN    multivitamin tablet 1 tablet, Daily    omeprazole (PriLOSEC) 40 mg DR capsule TAKE 1 CAPSULE DAILY. DO   NOT CRUSH OR CHEW.    polyethylene glycol (Glycolax, Miralax) 17 gram/dose powder Take 17 g by mouth 2 times a day as needed (constipation) for up to 10 days.    QUEtiapine (SEROQUEL) 50 mg, oral, Nightly    rosuvastatin (CRESTOR) 10 mg, oral, Daily    sildenafil (VIAGRA) 100 mg, oral, Daily PRN    tiZANidine (ZANAFLEX) 4 mg, oral, 3 times daily    tiZANidine (ZANAFLEX) 4 mg, oral, Every 8 hours PRN      Lab Results   Component Value Date    WBC 5.1 12/12/2024    HGB 16.4 12/12/2024    HCT 50.9 12/12/2024    MCV 97 12/12/2024     12/12/2024      Lab Results   Component Value Date    INR 1.2 (H) 07/31/2024    INR 1.3 (H) 11/14/2023    INR 1.3 (H) 04/27/2022    PROTIME 13.2 (H) 07/31/2024    PROTIME 14.7 (H) 11/14/2023    PROTIME 14.7 (H) 04/27/2022     No results found for: \"PTT\"  Lab Results   Component Value Date    GLUCOSE 133 (H) 12/12/2024    CALCIUM 9.5 12/12/2024     12/12/2024    K 4.1 12/12/2024    CO2 26 12/12/2024     12/12/2024    BUN 13 12/12/2024    CREATININE 1.03 12/12/2024       === 05/08/24 ===    MR LUMBAR SPINE W AND WO CONTRAST    - Impression -  There is again evidence of postoperative changes compatible with a  previous L3/4 discectomy, resection of the L3 spinous process, and  lumbar fusion. There is again evidence of metallic artifact from  posterior-lateral orthopedic fixation hardware and pedicle screws at  the L3/4 level.    There is " minimal anterolisthesis of L4 on L5.    There is multilevel spondylosis with varying degrees of spinal canal  and neural foraminal narrowing as described above.    There is again evidence of atrophy/fatty infiltration of the  posterior paraspinal musculature within the lower lumbar/sacral  region.    MACRO:  None.    Signed by: Krishna Prasad 5/22/2024 11:44 AM  Dictation workstation:   [id number]       Assessment/Plan   Cj Vargas is a 64 y.o. M who presents for lumbar transforaminal epidural steroid injection, a bilateral procedure.    Risks, benefits, alternatives discussed. All questions answered to the best of my ability. Patient agrees to proceed. Consent signed and patient marked appropriately.    -We will proceed with planned procedure  -Discussed with the patient that once appropriate from a recovery standpoint, they should continue physical therapy exercises at least 2-3 times per week for best long term outcomes  - discussed with the patient that if they take blood thinners, they may resume them in 24hrs. Eliquis restart tomorrow        Pablo Maxwell MD  Interventional Pain Fellow, PGY-5  [facility]         [1]   Past Medical History:  Diagnosis Date    Anemia     Arthritis 2010    Blood disorder 2011    BPH (benign prostatic hyperplasia)     Cataract 2020    Chronic pain disorder     CPAP (continuous positive airway pressure) dependence 2000    Depression     Erectile dysfunction     Extremity pain     GERD (gastroesophageal reflux disease)     Hyperlipidemia     Hypertension     Low back pain     Neuropathy     Obese     RADHA on CPAP     Osteoarthritis     Panic disorder     Peripheral neuropathy     Pulmonary arterial hypertension (Multi) 2000    Pulmonary embolism on long-term anticoagulation therapy (Multi) 2021    Eliquis    Recurrent deep vein thrombosis (DVT) (Multi)     Most recently 10/2023    Spinal stenosis 2015   [2]   Past Surgical History:  Procedure Laterality  Date    ANKLE Right 12/2021    arthroscopy    ANKLE Right 2023    athroscopy    APPENDECTOMY  1970    COLONOSCOPY      GASTRIC BYPASS  2017    sleeve gastrectomy    JOINT REPLACEMENT  2014, 2023    KNEE ARTHROSCOPY W/ DEBRIDEMENT Right 2018    LASIK  2005    LUMBAR FUSION      l3-4    ORTHOPEDIC SURGERY  9137-8928    RADIAL KERATOTOMY  1995    SPINAL FUSION  2021    TONSILLECTOMY  1974    TOTAL KNEE ARTHROPLASTY Right 11/28/2023    TOTAL KNEE ARTHROPLASTY Left 2013    TRIGGER POINT INJECTION  1923-1360   [3]   Family History  Problem Relation Name Age of Onset    Emphysema Mother Mom     COPD Mother Mom     Lupus Mother Mom     Coronary artery disease Father Dad     Hyperlipidemia Father Dad     Hypertension Father Dad     Stroke Father Dad     Alcohol abuse Father Dad     Hearing loss Father Dad     Vision loss Father Dad     Blood clot Father Dad     Hernia Father Dad     Macular degeneration Father Dad     Atrial fibrillation Brother Donavon     Astigmatism Brother Donavon     Clotting disorder Father Dad     Heart disease Father Dad

## 2025-07-12 DIAGNOSIS — K21.9 GASTROESOPHAGEAL REFLUX DISEASE WITHOUT ESOPHAGITIS: ICD-10-CM

## 2025-07-14 RX ORDER — OMEPRAZOLE 40 MG/1
CAPSULE, DELAYED RELEASE ORAL
Qty: 90 CAPSULE | Refills: 1 | Status: SHIPPED | OUTPATIENT
Start: 2025-07-14

## 2025-07-15 ENCOUNTER — APPOINTMENT (OUTPATIENT)
Dept: UROLOGY | Facility: CLINIC | Age: 65
End: 2025-07-15
Payer: COMMERCIAL

## 2025-07-20 NOTE — PROGRESS NOTES
[facility]  Outpatient Clinic Note    Subjective   Cj Vargas is a 64 y.o. male    Virtual or Telephone Consent    While technically available, the patient was unable or unwilling to consent to connect via audio/video telehealth technology; therefore, I performed this visit using a real-time audio only connection between Cj Vargas & BLAIR Ennis.  Verbal consent was requested and obtained from Cj Vargas on this date, 07/21/25 for a telehealth visit and the patient's location was confirmed at the time of the visit.  Unable to connect virtually     History of Present Illness   Patient presenting to clinic today follow up elevated PSA.  Last visit with myself 1/15/2025. History of RADHA, HTN, ED, Hypogonadism, remote history kidney stones, and Urinary retention.   Patient has no  bothersome urinary symptoms. Patient denies gross hematuria, dysuria, frequency, fevers, n/v, or flank pain.    Lab Results   Component Value Date    PSA 5.8 (H) 05/30/2025    PSA 4.5 (H) 03/06/2025    PSA 3.06 07/30/2024    PSA 1.80 06/12/2023    PSA 0.98 07/20/2022    PSA 1.51 04/27/2022    PSA 1.5 01/17/2019       Past Medical History and Surgical History   Past Medical History:   Diagnosis Date    Anemia     Arthritis 2010    Blood disorder 2011    BPH (benign prostatic hyperplasia)     Cataract 2020    Chronic pain disorder     CPAP (continuous positive airway pressure) dependence 2000    Depression     Erectile dysfunction     Extremity pain     GERD (gastroesophageal reflux disease)     Hyperlipidemia     Hypertension     Low back pain     Neuropathy     Obese     RADHA on CPAP     Osteoarthritis     Panic disorder     Peripheral neuropathy     Pulmonary arterial hypertension (Multi) 2000    Pulmonary embolism on long-term anticoagulation therapy (Multi) 2021    Eliquis    Recurrent deep vein thrombosis (DVT) (Multi)     Most recently 10/2023    Spinal stenosis 2015     Past Surgical History:   Procedure  Laterality Date    ANKLE Right 12/2021    arthroscopy    ANKLE Right 2023    athroscopy    APPENDECTOMY  1970    COLONOSCOPY      GASTRIC BYPASS  2017    sleeve gastrectomy    JOINT REPLACEMENT  2014, 2023    KNEE ARTHROSCOPY W/ DEBRIDEMENT Right 2018    LASIK  2005    LUMBAR FUSION      l3-4    ORTHOPEDIC SURGERY  [id number]    RADIAL KERATOTOMY  1995    SPINAL FUSION  2021    TONSILLECTOMY  1974    TOTAL KNEE ARTHROPLASTY Right 11/28/2023    TOTAL KNEE ARTHROPLASTY Left 2013    TRIGGER POINT INJECTION  [id number]       Medications  Current Outpatient Medications on File Prior to Visit   Medication Sig Dispense Refill    buPROPion (Wellbutrin) 100 mg tablet TAKE 1 TABLET 3 TIMES A DAY (Patient taking differently: Take 1 tablet (100 mg) by mouth 3 times a day.) 270 tablet 1    cholecalciferol, vitamin D3, 250 mcg (10,000 unit) tablet Take 1 tablet (250 mcg) by mouth every other day.      Eliquis 5 mg tablet TAKE 1 TABLET TWICE A  tablet 1    enoxaparin (Lovenox) 120 mg/0.8 mL syringe Inject 0.8 mL (120 mg) under the skin every 12 hours. (Patient not taking: Reported on 6/30/2025) 6 each 0    gabapentin (Neurontin) 600 mg tablet TAKE 1 TABLET 3 TIMES A  tablet 1    irbesartan-hydrochlorothiazide (Avalide) 150-12.5 mg tablet TAKE 1 TABLET ONCE DAILY 90 tablet 1    multivitamin tablet Take 1 tablet by mouth once daily.      omeprazole (PriLOSEC) 40 mg DR capsule TAKE 1 CAPSULE DAILY. DO   NOT CRUSH OR CHEW. 90 capsule 1    polyethylene glycol (Glycolax, Miralax) 17 gram/dose powder Take 17 g by mouth 2 times a day as needed (constipation) for up to 10 days. 510 g 0    QUEtiapine (SEROquel) 50 mg tablet Take 1 tablet (50 mg) by mouth once daily at bedtime. 90 tablet 0    rosuvastatin (Crestor) 10 mg tablet TAKE 1 TABLET ONCE DAILY 90 tablet 1    sildenafil (Viagra) 100 mg tablet Take 1 tablet (100 mg) by mouth once daily as needed for erectile dysfunction. 12 tablet 0    tiZANidine (Zanaflex) 4 mg tablet  Take 1 tablet (4 mg) by mouth 3 times a day. 270 tablet 0    tiZANidine (Zanaflex) 4 mg tablet Take 1 tablet (4 mg) by mouth every 8 hours if needed for muscle spasms. 90 tablet 0    [DISCONTINUED] omeprazole (PriLOSEC) 40 mg DR capsule TAKE 1 CAPSULE DAILY. DO   NOT CRUSH OR CHEW. 90 capsule 1     No current facility-administered medications on file prior to visit.       Objective   Physicial Exam  General: Well developed, well nourished, alert and cooperative, appears in no acute distress  Eyes: Non-injected conjunctiva, sclera clear, no proptosis  Cardiac: Extremities are warm and well perfused. No edema, cyanosis or pallor.   Lungs: Breathing is easy, non-labored. Speaking in clear and complete sentences. Normal diaphragmatic movement.  MSK: Ambulatory with steady gait, unassisted  Neuro: alert and oriented to person, place and time  Psych: Demonstrates good judgement and reason, without hallucinations, abnormal affect or abnormal behaviors.  Skin: no obvious lesions, no rashes.    No visits with results within 30 Day(s) from this visit.   Latest known visit with results is:   Lab on 05/30/2025   Component Date Value Ref Range Status    PSA 05/30/2025 5.8 (H)  0.0 - 4.0 ng/mL Final    INTERPRETIVE INFORMATION: Prostate Specific Antigen    The Roche PSA electrochemiluminescent immunoassay is used. Results   obtained with different test methods or kits cannot be used   interchangeably. The Roche PSA method is approved for use as an   aid in the detection of prostate cancer when used in conjunction   with a digital rectal exam in individuals with a prostate age 50   years and older. The Roche PSA is also indicated for the serial   measurement of PSA to aid in the prognosis and management of   prostate cancer patients. Elevated PSA concentrations can only   suggest the presence of prostate cancer until biopsy is performed.   PSA concentrations can also be elevated in benign prostatic   hyperplasia or inflammatory  conditions of the prostate. PSA is   generally not elevated in healthy individuals or individuals with   nonprostatic carcinoma.    PSA, Free 05/30/2025 0.8  ng/mL Final    PSA, Free Pct 05/30/2025 14  % Final    Comment: INTERPRETIVE INFORMATION: Prostate Specific Antigen, Free                            Percentage    [employer] uses the Roche Free PSA electrochemiluminescent immunoassay   method in conjunction with the Roche PSA electrochemiluminescent   immunoassay method to determine the free PSA percentage. Values   obtained with different assay methods should not be used   interchangeably. The free PSA percentage is an aid in   distinguishing prostate cancer from benign prostatic conditions in   individuals with a prostate age 50 years and older with a total   PSA between 3 and 10 ng/mL and negative digital rectal examination   findings. Prostatic biopsy is required for the diagnosis of   cancer.     In patients with total PSA concentrations of 4-10 ng/mL, the   probability of finding prostate cancer on needle biopsy by age in   years is:    %fPSA               50-59    60-69    70 or older  0  - 10%            49%      58%      65%  11 - 18%            27%      34%      41%  19 - 25%            18%                                 24%      30%  Greater than 25%     9%      12%      16%    Other factors may help determine the actual risk of prostate   cancer in individual patients.  Performed By: [email or web address]  [address] [place] [id number]  : Germán Savage MD, PhD  CLIA Number: [id number]      Review of Systems  All other systems have been reviewed and are negative for complaint.      Assessment and Plan     - Elevated PSA   Today we discussed PSA as a tool to screen gentleman for prostate cancer. We discussed that many factors can elevate the PSA, and when the PSA is elevated further testing is warranted.We discussed the patient's PSA as well as the controversy of using PSA  for screening, especially past the age of 70 years old. I explained that while PSA is used for  prostate cancer screening, it is not specific to prostate cancer and can be elevated with benign growth of the prostate. We discussed options of proceeding with repeating MRI, proceeding with transrectal ultrasound prostate biopsy, or continuing to monitor his PSA.     I discussed with the patient that the prostate MRI has a high sensitivity for high-grade prostate cancer lesions but a lower sensitivity for low to intermediate prostate cancer lesions.    Patient will schedule Prostate MRI. He will be scheduled for FUV to review     All questions and concerns were addressed. Patient verbalizes understanding and has no other questions at this time.     Sharon Roe-- ASHLEY DONATO  Office Phone:  [phone]

## 2025-07-21 ENCOUNTER — APPOINTMENT (OUTPATIENT)
Dept: UROLOGY | Facility: HOSPITAL | Age: 65
End: 2025-07-21
Payer: COMMERCIAL

## 2025-07-21 DIAGNOSIS — R97.20 ELEVATED PSA: Primary | ICD-10-CM

## 2025-07-21 PROCEDURE — 99213 OFFICE O/P EST LOW 20 MIN: CPT | Performed by: NURSE PRACTITIONER

## 2025-07-21 PROCEDURE — 1036F TOBACCO NON-USER: CPT | Performed by: NURSE PRACTITIONER

## 2025-08-04 ENCOUNTER — HOSPITAL ENCOUNTER (OUTPATIENT)
Dept: RADIOLOGY | Facility: HOSPITAL | Age: 65
Discharge: HOME | End: 2025-08-04
Payer: COMMERCIAL

## 2025-08-04 VITALS — WEIGHT: 295.42 LBS | BODY MASS INDEX: 38.98 KG/M2

## 2025-08-04 DIAGNOSIS — R97.20 ELEVATED PSA: ICD-10-CM

## 2025-08-04 PROCEDURE — A9575 INJ GADOTERATE MEGLUMI 0.1ML: HCPCS | Performed by: NURSE PRACTITIONER

## 2025-08-04 PROCEDURE — 76498 UNLISTED MR PROCEDURE: CPT

## 2025-08-04 PROCEDURE — 72197 MRI PELVIS W/O & W/DYE: CPT

## 2025-08-04 PROCEDURE — 2550000001 HC RX 255 CONTRASTS: Performed by: NURSE PRACTITIONER

## 2025-08-04 RX ORDER — GADOTERATE MEGLUMINE 376.9 MG/ML
27 INJECTION INTRAVENOUS
Status: COMPLETED | OUTPATIENT
Start: 2025-08-04 | End: 2025-08-04

## 2025-08-04 RX ADMIN — GADOTERATE MEGLUMINE 27 ML: 376.9 INJECTION INTRAVENOUS at 09:33

## 2025-08-05 DIAGNOSIS — M51.16 LUMBAR DISC DISEASE WITH RADICULOPATHY: ICD-10-CM

## 2025-08-05 RX ORDER — TIZANIDINE 4 MG/1
4 TABLET ORAL EVERY 8 HOURS PRN
Qty: 90 TABLET | Refills: 0 | Status: SHIPPED | OUTPATIENT
Start: 2025-08-05 | End: 2025-09-04

## 2025-08-06 ENCOUNTER — CLINICAL SUPPORT (OUTPATIENT)
Dept: SLEEP MEDICINE | Facility: CLINIC | Age: 65
End: 2025-08-06
Payer: COMMERCIAL

## 2025-08-06 DIAGNOSIS — G47.33 OSA ON CPAP: ICD-10-CM

## 2025-08-07 VITALS
BODY MASS INDEX: 39.15 KG/M2 | DIASTOLIC BLOOD PRESSURE: 72 MMHG | HEIGHT: 73 IN | SYSTOLIC BLOOD PRESSURE: 123 MMHG | WEIGHT: 295.42 LBS

## 2025-08-07 DIAGNOSIS — G47.33 OBSTRUCTIVE SLEEP APNEA SYNDROME IN ADULT: ICD-10-CM

## 2025-08-07 ASSESSMENT — SLEEP AND FATIGUE QUESTIONNAIRES
HOW LIKELY ARE YOU TO NOD OFF OR FALL ASLEEP IN A CAR, WHILE STOPPED FOR A FEW MINUTES IN TRAFFIC: WOULD NEVER DOZE
HOW LIKELY ARE YOU TO NOD OFF OR FALL ASLEEP WHILE LYING DOWN TO REST IN THE AFTERNOON WHEN CIRCUMSTANCES PERMIT: WOULD NEVER DOZE
HOW LIKELY ARE YOU TO NOD OFF OR FALL ASLEEP WHILE WATCHING TV: SLIGHT CHANCE OF DOZING
HOW LIKELY ARE YOU TO NOD OFF OR FALL ASLEEP WHEN YOU ARE A PASSENGER IN A CAR FOR AN HOUR WITHOUT A BREAK: WOULD NEVER DOZE
HOW LIKELY ARE YOU TO NOD OFF OR FALL ASLEEP WHILE SITTING AND TALKING TO SOMEONE: WOULD NEVER DOZE
SITING INACTIVE IN A PUBLIC PLACE LIKE A CLASS ROOM OR A MOVIE THEATER: WOULD NEVER DOZE
HOW LIKELY ARE YOU TO NOD OFF OR FALL ASLEEP WHILE SITTING QUIETLY AFTER LUNCH WITHOUT ALCOHOL: WOULD NEVER DOZE
HOW LIKELY ARE YOU TO NOD OFF OR FALL ASLEEP WHILE SITTING AND READING: WOULD NEVER DOZE
ESS-CHAD TOTAL SCORE: 1

## 2025-08-07 NOTE — PROGRESS NOTES
[facility] TECH NOTE:     Patient: Cj Vargas   MRN//AGE: [id number]  1960  64 y.o.   Technologist: Anu Thompson   Room: 3   Service Date: 2025        [facility]    TECHNOLOGIST SLEEP STUDY PROCEDURE NOTE:   This sleep study is being conducted according to the policies and procedures outlined by the AAS accreditation standards.  The sleep study procedure and processes involved during this appointment was explained to the patient/patient’s family, questions were answered. The patient/family verbalized understanding.      The patient is a 64 y.o. year old male scheduled for a Split Night Study with montage of: standard. He arrived for his appointment.      The study that was ultimately completed was a diagnostic PSG with montage of: standard.    The full study Was not (add for the reason: the patient left early and requested to end the study) completed.  Patient questionnaires completed?: yes     Consents signed? yes    Initial Fall Risk Screening:     Cj has not fallen in the last 6 months. His fall did not result in injury. Cj does not have a fear of falling. He does not need assistance with sitting, standing, or walking. He does not need assistance walking in his home. He does not need assistance in an unfamiliar setting. The patient is not using an assistive device.     Brief Study observations: This patient presents as a Split Night Study, AHI=10, needs new CPAP equipment. He arrived at 8:00 pm by himself. The sleep study procedure and process was explained to the patient and questions were answered. He verbalized understanding. He was not feeling very positive about starting the study without CPAP. He was worried he would not be able to sleep w/o CPAP. [employer] tried to encourage him. Very soon after the study started the patient was complaining about the bed hurting his back. He told the tech that he had back surgery and it seemed like there was something hard  "pushing in to his back. We tried to elevate the head of the bed, removed the chux pad from underneath the fitted sheet, added an extra pillow under the lumbar area of his back to relieve some pressure off of his back but still nothing seemed to help. He also mentioned that he was starting to feel the beginning of spasms in his back. The patient requested to try another bed. There were not any extra bed available tonight. The tech offered he try the recliner, he declined. The patient then told the tech that he is \"ready to rip off the wires and call his wife to pick him up.\" He requested to end the study. Tech gave him an AMA form to sign. He declined to sign the AMA but did leave comments on the form regarding the bed. Please reference vidoe/audio recording for any additional information.      Deviation to order/protocol and reason: Please see above comments       If PAP, which was preferred mask/pressure/mode: N/A      Other:None    After the procedure, the patient/family was informed to ensure followup with ordering clinician for testing results.      Technologist: Anu Thompson    "

## 2025-08-15 DIAGNOSIS — G47.33 OSA ON CPAP: Primary | ICD-10-CM

## 2025-08-19 ENCOUNTER — CLINICAL SUPPORT (OUTPATIENT)
Dept: SLEEP MEDICINE | Facility: CLINIC | Age: 65
End: 2025-08-19
Payer: COMMERCIAL

## 2025-08-19 VITALS
WEIGHT: 295.42 LBS | HEIGHT: 73 IN | SYSTOLIC BLOOD PRESSURE: 132 MMHG | DIASTOLIC BLOOD PRESSURE: 72 MMHG | BODY MASS INDEX: 39.15 KG/M2

## 2025-08-19 DIAGNOSIS — G47.33 OBSTRUCTIVE SLEEP APNEA (ADULT) (PEDIATRIC): ICD-10-CM

## 2025-08-19 DIAGNOSIS — G47.61 PERIODIC LIMB MOVEMENT DISORDER: ICD-10-CM

## 2025-08-19 PROCEDURE — 95811 POLYSOM 6/>YRS CPAP 4/> PARM: CPT | Performed by: PSYCHIATRY & NEUROLOGY

## 2025-08-19 ASSESSMENT — SLEEP AND FATIGUE QUESTIONNAIRES
HOW LIKELY ARE YOU TO NOD OFF OR FALL ASLEEP WHILE SITTING AND READING: WOULD NEVER DOZE
SITING INACTIVE IN A PUBLIC PLACE LIKE A CLASS ROOM OR A MOVIE THEATER: WOULD NEVER DOZE
ESS-CHAD TOTAL SCORE: 1
HOW LIKELY ARE YOU TO NOD OFF OR FALL ASLEEP WHILE SITTING AND TALKING TO SOMEONE: WOULD NEVER DOZE
HOW LIKELY ARE YOU TO NOD OFF OR FALL ASLEEP WHILE WATCHING TV: SLIGHT CHANCE OF DOZING
HOW LIKELY ARE YOU TO NOD OFF OR FALL ASLEEP IN A CAR, WHILE STOPPED FOR A FEW MINUTES IN TRAFFIC: WOULD NEVER DOZE
HOW LIKELY ARE YOU TO NOD OFF OR FALL ASLEEP WHILE LYING DOWN TO REST IN THE AFTERNOON WHEN CIRCUMSTANCES PERMIT: WOULD NEVER DOZE
HOW LIKELY ARE YOU TO NOD OFF OR FALL ASLEEP WHEN YOU ARE A PASSENGER IN A CAR FOR AN HOUR WITHOUT A BREAK: WOULD NEVER DOZE
HOW LIKELY ARE YOU TO NOD OFF OR FALL ASLEEP WHILE SITTING QUIETLY AFTER LUNCH WITHOUT ALCOHOL: WOULD NEVER DOZE

## 2025-08-21 ENCOUNTER — APPOINTMENT (OUTPATIENT)
Dept: UROLOGY | Facility: CLINIC | Age: 65
End: 2025-08-21
Payer: COMMERCIAL

## 2025-08-21 ENCOUNTER — APPOINTMENT (OUTPATIENT)
Dept: SLEEP MEDICINE | Facility: CLINIC | Age: 65
End: 2025-08-21
Payer: COMMERCIAL

## 2025-08-21 VITALS — TEMPERATURE: 97.8 F

## 2025-08-21 DIAGNOSIS — R97.20 ELEVATED PSA: Primary | ICD-10-CM

## 2025-08-21 PROCEDURE — 1036F TOBACCO NON-USER: CPT | Performed by: NURSE PRACTITIONER

## 2025-08-21 PROCEDURE — 99214 OFFICE O/P EST MOD 30 MIN: CPT | Performed by: NURSE PRACTITIONER

## 2025-08-21 ASSESSMENT — PAIN SCALES - GENERAL: PAINLEVEL_OUTOF10: 0-NO PAIN

## 2025-08-22 DIAGNOSIS — R97.20 ELEVATED PSA: ICD-10-CM

## 2025-08-26 ENCOUNTER — APPOINTMENT (OUTPATIENT)
Dept: PRIMARY CARE | Facility: CLINIC | Age: 65
End: 2025-08-26
Payer: COMMERCIAL

## 2025-08-26 ENCOUNTER — OFFICE VISIT (OUTPATIENT)
Dept: PAIN MEDICINE | Facility: HOSPITAL | Age: 65
End: 2025-08-26
Payer: COMMERCIAL

## 2025-08-26 VITALS
WEIGHT: 291 LBS | HEART RATE: 72 BPM | DIASTOLIC BLOOD PRESSURE: 81 MMHG | SYSTOLIC BLOOD PRESSURE: 142 MMHG | BODY MASS INDEX: 39.42 KG/M2 | HEIGHT: 72 IN

## 2025-08-26 DIAGNOSIS — I27.82 OTHER CHRONIC PULMONARY EMBOLISM, UNSPECIFIED WHETHER ACUTE COR PULMONALE PRESENT (MULTI): ICD-10-CM

## 2025-08-26 DIAGNOSIS — D64.9 ANEMIA, UNSPECIFIED TYPE: ICD-10-CM

## 2025-08-26 DIAGNOSIS — E78.5 DYSLIPIDEMIA: ICD-10-CM

## 2025-08-26 DIAGNOSIS — D68.59 HYPERCOAGULABLE STATE (MULTI): ICD-10-CM

## 2025-08-26 DIAGNOSIS — G47.33 OBSTRUCTIVE SLEEP APNEA SYNDROME IN ADULT: ICD-10-CM

## 2025-08-26 DIAGNOSIS — I10 PRIMARY HYPERTENSION: ICD-10-CM

## 2025-08-26 DIAGNOSIS — E66.812 OBESITY, CLASS II, BMI 35-39.9: ICD-10-CM

## 2025-08-26 DIAGNOSIS — R97.20 ELEVATED PSA: ICD-10-CM

## 2025-08-26 DIAGNOSIS — G47.33 OSA (OBSTRUCTIVE SLEEP APNEA): Primary | ICD-10-CM

## 2025-08-26 DIAGNOSIS — I10 BENIGN ESSENTIAL HYPERTENSION: ICD-10-CM

## 2025-08-26 DIAGNOSIS — M54.16 LUMBAR RADICULOPATHY: ICD-10-CM

## 2025-08-26 DIAGNOSIS — E78.49 ESSENTIAL FAMILIAL HYPERLIPIDEMIA: ICD-10-CM

## 2025-08-26 PROCEDURE — 99214 OFFICE O/P EST MOD 30 MIN: CPT | Performed by: FAMILY MEDICINE

## 2025-08-26 PROCEDURE — 3008F BODY MASS INDEX DOCD: CPT | Performed by: FAMILY MEDICINE

## 2025-08-26 PROCEDURE — 1036F TOBACCO NON-USER: CPT | Performed by: FAMILY MEDICINE

## 2025-08-26 PROCEDURE — 3079F DIAST BP 80-89 MM HG: CPT | Performed by: FAMILY MEDICINE

## 2025-08-26 PROCEDURE — 3077F SYST BP >= 140 MM HG: CPT | Performed by: FAMILY MEDICINE

## 2025-08-26 ASSESSMENT — ENCOUNTER SYMPTOMS
APNEA: 1
GASTROINTESTINAL NEGATIVE: 1
HEMATOLOGIC/LYMPHATIC NEGATIVE: 1
EYES NEGATIVE: 1
ARTHRALGIAS: 1
LOSS OF SENSATION IN FEET: 0
NEUROLOGICAL NEGATIVE: 1
PSYCHIATRIC NEGATIVE: 1
CONSTITUTIONAL NEGATIVE: 1
DEPRESSION: 0
ENDOCRINE NEGATIVE: 1
ALLERGIC/IMMUNOLOGIC NEGATIVE: 1
CARDIOVASCULAR NEGATIVE: 1
FREQUENCY: 1
OCCASIONAL FEELINGS OF UNSTEADINESS: 0

## 2025-08-26 ASSESSMENT — PAIN SCALES - GENERAL: PAINLEVEL_OUTOF10: 7

## 2025-08-26 ASSESSMENT — PATIENT HEALTH QUESTIONNAIRE - PHQ9
1. LITTLE INTEREST OR PLEASURE IN DOING THINGS: NOT AT ALL
2. FEELING DOWN, DEPRESSED OR HOPELESS: NOT AT ALL
SUM OF ALL RESPONSES TO PHQ9 QUESTIONS 1 AND 2: 0

## 2025-08-27 ENCOUNTER — RESULTS FOLLOW-UP (OUTPATIENT)
Dept: PRIMARY CARE | Facility: CLINIC | Age: 65
End: 2025-08-27
Payer: COMMERCIAL

## 2025-08-27 DIAGNOSIS — E78.5 DYSLIPIDEMIA: Primary | ICD-10-CM

## 2025-08-27 LAB
ALBUMIN SERPL-MCNC: 4.4 G/DL (ref 3.6–5.1)
ALP SERPL-CCNC: 69 U/L (ref 35–144)
ALT SERPL-CCNC: 29 U/L (ref 9–46)
ANION GAP SERPL CALCULATED.4IONS-SCNC: 10 MMOL/L (CALC) (ref 7–17)
AST SERPL-CCNC: 26 U/L (ref 10–35)
BASOPHILS # BLD AUTO: 18 CELLS/UL (ref 0–200)
BASOPHILS NFR BLD AUTO: 0.3 %
BILIRUB SERPL-MCNC: 1.1 MG/DL (ref 0.2–1.2)
BUN SERPL-MCNC: 13 MG/DL (ref 7–25)
CALCIUM SERPL-MCNC: 9.9 MG/DL (ref 8.6–10.3)
CHLORIDE SERPL-SCNC: 102 MMOL/L (ref 98–110)
CHOLEST SERPL-MCNC: 186 MG/DL
CHOLEST/HDLC SERPL: 5 (CALC)
CO2 SERPL-SCNC: 25 MMOL/L (ref 20–32)
CREAT SERPL-MCNC: 1.24 MG/DL (ref 0.7–1.35)
EGFRCR SERPLBLD CKD-EPI 2021: 65 ML/MIN/1.73M2
EOSINOPHIL # BLD AUTO: 148 CELLS/UL (ref 15–500)
EOSINOPHIL NFR BLD AUTO: 2.5 %
ERYTHROCYTE [DISTWIDTH] IN BLOOD BY AUTOMATED COUNT: 13.1 % (ref 11–15)
GLUCOSE SERPL-MCNC: 123 MG/DL (ref 65–99)
HCT VFR BLD AUTO: 48.3 % (ref 38.5–50)
HDLC SERPL-MCNC: 37 MG/DL
HGB BLD-MCNC: 16.2 G/DL (ref 13.2–17.1)
LDLC SERPL CALC-MCNC: 121 MG/DL (CALC)
LYMPHOCYTES # BLD AUTO: 1847 CELLS/UL (ref 850–3900)
LYMPHOCYTES NFR BLD AUTO: 31.3 %
MCH RBC QN AUTO: 32.7 PG (ref 27–33)
MCHC RBC AUTO-ENTMCNC: 33.5 G/DL (ref 32–36)
MCV RBC AUTO: 97.6 FL (ref 80–100)
MONOCYTES # BLD AUTO: 348 CELLS/UL (ref 200–950)
MONOCYTES NFR BLD AUTO: 5.9 %
NEUTROPHILS # BLD AUTO: 3540 CELLS/UL (ref 1500–7800)
NEUTROPHILS NFR BLD AUTO: 60 %
NONHDLC SERPL-MCNC: 149 MG/DL (CALC)
PLATELET # BLD AUTO: 192 THOUSAND/UL (ref 140–400)
PMV BLD REES-ECKER: 11.1 FL (ref 7.5–12.5)
POTASSIUM SERPL-SCNC: 4 MMOL/L (ref 3.5–5.3)
PROT SERPL-MCNC: 6.7 G/DL (ref 6.1–8.1)
RBC # BLD AUTO: 4.95 MILLION/UL (ref 4.2–5.8)
SODIUM SERPL-SCNC: 137 MMOL/L (ref 135–146)
TRIGL SERPL-MCNC: 163 MG/DL
WBC # BLD AUTO: 5.9 THOUSAND/UL (ref 3.8–10.8)

## 2025-08-27 RX ORDER — ROSUVASTATIN CALCIUM 20 MG/1
20 TABLET, COATED ORAL DAILY
Qty: 90 TABLET | Refills: 1 | Status: SHIPPED | OUTPATIENT
Start: 2025-08-27 | End: 2026-10-01

## 2025-09-08 ENCOUNTER — APPOINTMENT (OUTPATIENT)
Dept: OPHTHALMOLOGY | Facility: CLINIC | Age: 65
End: 2025-09-08
Payer: COMMERCIAL

## 2025-09-09 ENCOUNTER — APPOINTMENT (OUTPATIENT)
Dept: ORTHOPEDIC SURGERY | Facility: CLINIC | Age: 65
End: 2025-09-09
Payer: COMMERCIAL

## 2025-11-06 ENCOUNTER — APPOINTMENT (OUTPATIENT)
Dept: UROLOGY | Facility: CLINIC | Age: 65
End: 2025-11-06
Payer: COMMERCIAL

## (undated) DEVICE — SUTURE, SILK, 2-0, 30 IN, SH, BLACK

## (undated) DEVICE — CATHETER, THORACIC, STRAIGHT, ADULT, 28 FR, PVC

## (undated) DEVICE — DRESSING, GAUZE, WASHED FLUFF, LARGE, STERILE

## (undated) DEVICE — LOOP, VESSEL, MAXI, RED

## (undated) DEVICE — DRAPE, SHEET, FAN FOLDED, HALF, 44 X 58 IN, DISPOSABLE, LF, STERILE

## (undated) DEVICE — CLIPPER, SURGICAL BLADE ASSEMBLY, GENERAL PURPOSE, SINGLE USE

## (undated) DEVICE — SPONGE, DISSECTOR, PEANUT, 3/8, STERILE 5 FOAM HOLDER"

## (undated) DEVICE — GLOVE, SURGICAL, PROTEXIS PI W/NEU-THERA, 8.0, PF, LF

## (undated) DEVICE — LOOP, VESSEL, MAXI, BLUE

## (undated) DEVICE — SPONGE, HEMOSTATIC, GELATIN, SURGIFOAM, 8 X 12.5 CM X 10 MM

## (undated) DEVICE — EVACUATOR, WOUND, CLOSED, 3 SPRING, 400 CC, Y CONNECTING TUBE

## (undated) DEVICE — CLIP, LIGATING, HORIZON, MEDIUM, TITANIUM

## (undated) DEVICE — SUTURE, VICRYL, 1, 27 IN, CT-1, VIOLET

## (undated) DEVICE — SUTURE, VICRYL, 0, 27 IN, CT-1, UNDYED

## (undated) DEVICE — CLIP, LIGATING, HORIZON, LARGE, TITANIUM

## (undated) DEVICE — SUTURE, VICRYL, 3-0, 54 IN, TIE, VIOLET

## (undated) DEVICE — DRAPE, INCISE, ANTIMICROBIAL, IOBAN 2, STERI DRAPE, 23 X 33 IN, DISPOSABLE, STERILE

## (undated) DEVICE — GOWN, ASTOUND, XL

## (undated) DEVICE — CATHETER TRAY, SURESTEP, 16FR, URINE METER W/STATLOCK

## (undated) DEVICE — CLOSURE SYSTEM, DERMABOND, PRINEO, 22CM, STERILE

## (undated) DEVICE — STRIP, SKIN CLOSURE, STERI STRIP, REINFORCED, 0.5 X 4 IN

## (undated) DEVICE — SPHERE, STEALTHSTATION, 5-PK

## (undated) DEVICE — GLOVE, SURGICAL, PROTEXIS PI MICRO, 8.0, PF, LF

## (undated) DEVICE — SUTURE, SILK, 3-0, 30 IN, MULTIPACK, BLACK

## (undated) DEVICE — COVER, TABLE, UHC

## (undated) DEVICE — ELECTRODE, ELECTROSURGICAL, BLADE, EXTENDED, 6.5 IN, STAINLESS STEEL

## (undated) DEVICE — MANIFOLD, 4 PORT NEPTUNE STANDARD

## (undated) DEVICE — COLLECTION UNIT, DRAINAGE, THORACIC, SINGLE TUBE, DRY SUCTION, ATS COMPATIBLE, OASIS 3600, LF

## (undated) DEVICE — SUTURE, SILK, 0, 24 IN, SUTUPAK, BLACK

## (undated) DEVICE — SPONGE, GAUZE, XRAY DECT, 16 PLY, 4 X 4, W/MASTER DMT,STERILE

## (undated) DEVICE — SUTURE, VICRYL, 1, 27 IN, TP-1, VIOLET

## (undated) DEVICE — DRESSING, NON-ADHERENT, OIL EMULSION, CURITY, 3 X 8 IN, STERILE

## (undated) DEVICE — TIP, SUCTION, FRAZIER, W/CONTROL VENT, 12 FR

## (undated) DEVICE — Device

## (undated) DEVICE — SUTURE, SILK, 2-0, TIES, 12-30 IN, BLACK

## (undated) DEVICE — DRESSING, MEPILEX BORDER, POST-OP AG, 4 X 10 IN

## (undated) DEVICE — STAPLER, SKIN PROXIMATE, 35 WIDE

## (undated) DEVICE — SUTURE, VICRYL, 2-0, 27 IN, FSL, UNDYED

## (undated) DEVICE — COVER, CART, 45 X 27 X 48 IN, CLEAR

## (undated) DEVICE — HOOD, STERISHIELD T4 SYSTEM

## (undated) DEVICE — SUTURE, VICRYL, 0, 36 IN, CT-1, UNDYED

## (undated) DEVICE — BONE, MILL, MIDAS REX, DUAL BLADE, ELECTRIC

## (undated) DEVICE — SUTURE, VICRYL, 4-0, 18 IN, UNDYED BR PS-2

## (undated) DEVICE — CATHETER, IV, ANGIOCATH, 20 G X 1.16 IN, FEP POLYMER

## (undated) DEVICE — SEALANT, HEMOSTATIC, FLOSEAL, 10 ML

## (undated) DEVICE — SUTURE, MONOCRYL, 4-0, 18 IN, PS2, UNDYED

## (undated) DEVICE — REST, HEAD, BAGEL, 9 IN